# Patient Record
Sex: FEMALE | Race: WHITE | NOT HISPANIC OR LATINO | Employment: OTHER | URBAN - NONMETROPOLITAN AREA
[De-identification: names, ages, dates, MRNs, and addresses within clinical notes are randomized per-mention and may not be internally consistent; named-entity substitution may affect disease eponyms.]

---

## 2019-09-26 ENCOUNTER — APPOINTMENT (OUTPATIENT)
Dept: ULTRASOUND IMAGING | Facility: HOSPITAL | Age: 59
End: 2019-09-26

## 2019-09-26 ENCOUNTER — HOSPITAL ENCOUNTER (INPATIENT)
Facility: HOSPITAL | Age: 59
LOS: 5 days | Discharge: HOME | End: 2019-10-01
Attending: FAMILY MEDICINE | Admitting: FAMILY MEDICINE

## 2019-09-26 DIAGNOSIS — J44.1 CHRONIC OBSTRUCTIVE PULMONARY DISEASE WITH ACUTE EXACERBATION (HCC): ICD-10-CM

## 2019-09-26 DIAGNOSIS — E87.1 HYPONATREMIA: ICD-10-CM

## 2019-09-26 DIAGNOSIS — J44.0 CHRONIC OBSTRUCTIVE PULMONARY DISEASE WITH ACUTE LOWER RESPIRATORY INFECTION (HCC): ICD-10-CM

## 2019-09-26 DIAGNOSIS — G89.29 OTHER CHRONIC PAIN: ICD-10-CM

## 2019-09-26 DIAGNOSIS — J44.9 CHRONIC OBSTRUCTIVE PULMONARY DISEASE, UNSPECIFIED COPD TYPE (HCC): Primary | ICD-10-CM

## 2019-09-26 DIAGNOSIS — D64.9 ANEMIA, UNSPECIFIED TYPE: ICD-10-CM

## 2019-09-26 DIAGNOSIS — Z74.09 IMPAIRED FUNCTIONAL MOBILITY AND ACTIVITY TOLERANCE: ICD-10-CM

## 2019-09-26 LAB
ALBUMIN SERPL-MCNC: 4.3 G/DL (ref 3.5–5.2)
ALBUMIN/GLOB SERPL: 1.4 G/DL
ALP SERPL-CCNC: 49 U/L (ref 39–117)
ALT SERPL W P-5'-P-CCNC: 12 U/L (ref 1–33)
AMYLASE SERPL-CCNC: 47 U/L (ref 28–100)
ANION GAP SERPL CALCULATED.3IONS-SCNC: 13 MMOL/L (ref 5–15)
ANISOCYTOSIS BLD QL: ABNORMAL
AST SERPL-CCNC: 20 U/L (ref 1–32)
BASOPHILS # BLD MANUAL: 0.05 10*3/MM3 (ref 0–0.2)
BASOPHILS NFR BLD AUTO: 1 % (ref 0–1.5)
BILIRUB SERPL-MCNC: 0.4 MG/DL (ref 0.2–1.2)
BUN BLD-MCNC: 20 MG/DL (ref 8–23)
BUN/CREAT SERPL: 30.3 (ref 7–25)
CALCIUM SPEC-SCNC: 8.8 MG/DL (ref 8.6–10.5)
CHLORIDE SERPL-SCNC: 85 MMOL/L (ref 98–107)
CO2 SERPL-SCNC: 23 MMOL/L (ref 22–29)
CREAT BLD-MCNC: 0.66 MG/DL (ref 0.57–1)
DEPRECATED RDW RBC AUTO: 42.9 FL (ref 37–54)
EOSINOPHIL # BLD MANUAL: 0.05 10*3/MM3 (ref 0–0.4)
EOSINOPHIL NFR BLD MANUAL: 1 % (ref 0.3–6.2)
ERYTHROCYTE [DISTWIDTH] IN BLOOD BY AUTOMATED COUNT: 15.9 % (ref 12.3–15.4)
GFR SERPL CREATININE-BSD FRML MDRD: 91 ML/MIN/1.73
GIANT PLATELETS: ABNORMAL
GLOBULIN UR ELPH-MCNC: 3 GM/DL
GLUCOSE BLD-MCNC: 101 MG/DL (ref 65–99)
HCT VFR BLD AUTO: 25 % (ref 34–46.6)
HGB BLD-MCNC: 8 G/DL (ref 12–15.9)
HYPOCHROMIA BLD QL: ABNORMAL
LIPASE SERPL-CCNC: 59 U/L (ref 13–60)
LYMPHOCYTES # BLD MANUAL: 0.91 10*3/MM3 (ref 0.7–3.1)
LYMPHOCYTES NFR BLD MANUAL: 2 % (ref 5–12)
LYMPHOCYTES NFR BLD MANUAL: 20 % (ref 19.6–45.3)
MCH RBC QN AUTO: 24 PG (ref 26.6–33)
MCHC RBC AUTO-ENTMCNC: 32 G/DL (ref 31.5–35.7)
MCV RBC AUTO: 74.9 FL (ref 79–97)
MONOCYTES # BLD AUTO: 0.09 10*3/MM3 (ref 0.1–0.9)
NEUTROPHILS # BLD AUTO: 3.45 10*3/MM3 (ref 1.7–7)
NEUTROPHILS NFR BLD MANUAL: 76 % (ref 42.7–76)
PLATELET # BLD AUTO: 123 10*3/MM3 (ref 140–450)
PMV BLD AUTO: 9.7 FL (ref 6–12)
POTASSIUM BLD-SCNC: 4.4 MMOL/L (ref 3.5–5.2)
PROT SERPL-MCNC: 7.3 G/DL (ref 6–8.5)
RBC # BLD AUTO: 3.34 10*6/MM3 (ref 3.77–5.28)
SMALL PLATELETS BLD QL SMEAR: ABNORMAL
SODIUM BLD-SCNC: 121 MMOL/L (ref 136–145)
WBC MORPH BLD: NORMAL
WBC NRBC COR # BLD: 4.54 10*3/MM3 (ref 3.4–10.8)

## 2019-09-26 PROCEDURE — 94799 UNLISTED PULMONARY SVC/PX: CPT

## 2019-09-26 PROCEDURE — 85007 BL SMEAR W/DIFF WBC COUNT: CPT | Performed by: FAMILY MEDICINE

## 2019-09-26 PROCEDURE — 83690 ASSAY OF LIPASE: CPT | Performed by: FAMILY MEDICINE

## 2019-09-26 PROCEDURE — 94760 N-INVAS EAR/PLS OXIMETRY 1: CPT

## 2019-09-26 PROCEDURE — 82150 ASSAY OF AMYLASE: CPT | Performed by: FAMILY MEDICINE

## 2019-09-26 PROCEDURE — 85025 COMPLETE CBC W/AUTO DIFF WBC: CPT | Performed by: FAMILY MEDICINE

## 2019-09-26 PROCEDURE — 94640 AIRWAY INHALATION TREATMENT: CPT

## 2019-09-26 PROCEDURE — 80053 COMPREHEN METABOLIC PANEL: CPT | Performed by: FAMILY MEDICINE

## 2019-09-26 PROCEDURE — 76705 ECHO EXAM OF ABDOMEN: CPT

## 2019-09-26 RX ORDER — MONTELUKAST SODIUM 10 MG/1
10 TABLET ORAL
COMMUNITY

## 2019-09-26 RX ORDER — CYCLOBENZAPRINE HCL 10 MG
10 TABLET ORAL 3 TIMES DAILY PRN
Status: DISCONTINUED | OUTPATIENT
Start: 2019-09-26 | End: 2019-10-01 | Stop reason: HOSPADM

## 2019-09-26 RX ORDER — ONDANSETRON 2 MG/ML
4 INJECTION INTRAMUSCULAR; INTRAVENOUS EVERY 6 HOURS PRN
Status: DISCONTINUED | OUTPATIENT
Start: 2019-09-26 | End: 2019-10-01 | Stop reason: HOSPADM

## 2019-09-26 RX ORDER — LOSARTAN POTASSIUM AND HYDROCHLOROTHIAZIDE 25; 100 MG/1; MG/1
1 TABLET ORAL 2 TIMES DAILY
COMMUNITY
End: 2020-01-01

## 2019-09-26 RX ORDER — ALPRAZOLAM 0.5 MG/1
0.5 TABLET ORAL 3 TIMES DAILY PRN
Status: ON HOLD | COMMUNITY
End: 2020-01-01

## 2019-09-26 RX ORDER — HYDROCODONE BITARTRATE AND ACETAMINOPHEN 10; 325 MG/1; MG/1
2 TABLET ORAL 4 TIMES DAILY PRN
COMMUNITY
End: 2020-01-01 | Stop reason: HOSPADM

## 2019-09-26 RX ORDER — IPRATROPIUM BROMIDE AND ALBUTEROL SULFATE 2.5; .5 MG/3ML; MG/3ML
3 SOLUTION RESPIRATORY (INHALATION)
Status: DISCONTINUED | OUTPATIENT
Start: 2019-09-26 | End: 2019-10-01 | Stop reason: HOSPADM

## 2019-09-26 RX ORDER — METOPROLOL SUCCINATE 50 MG/1
50 TABLET, EXTENDED RELEASE ORAL
Status: DISCONTINUED | OUTPATIENT
Start: 2019-09-26 | End: 2019-10-01 | Stop reason: HOSPADM

## 2019-09-26 RX ORDER — MONTELUKAST SODIUM 10 MG/1
10 TABLET ORAL
Status: DISCONTINUED | OUTPATIENT
Start: 2019-09-26 | End: 2019-10-01 | Stop reason: HOSPADM

## 2019-09-26 RX ORDER — CYCLOBENZAPRINE HCL 10 MG
10 TABLET ORAL 3 TIMES DAILY PRN
COMMUNITY
End: 2019-10-01 | Stop reason: HOSPADM

## 2019-09-26 RX ORDER — DICYCLOMINE HYDROCHLORIDE 10 MG/1
10 CAPSULE ORAL
Status: DISCONTINUED | OUTPATIENT
Start: 2019-09-26 | End: 2019-10-01 | Stop reason: HOSPADM

## 2019-09-26 RX ORDER — HYDROCODONE BITARTRATE AND ACETAMINOPHEN 10; 325 MG/1; MG/1
2 TABLET ORAL EVERY 6 HOURS PRN
Status: DISCONTINUED | OUTPATIENT
Start: 2019-09-26 | End: 2019-09-27

## 2019-09-26 RX ORDER — ESTRADIOL 1 MG/1
1 TABLET ORAL DAILY
COMMUNITY
Start: 2019-08-05 | End: 2019-10-01 | Stop reason: HOSPADM

## 2019-09-26 RX ORDER — ALBUTEROL SULFATE 90 UG/1
2 AEROSOL, METERED RESPIRATORY (INHALATION) EVERY 6 HOURS PRN
COMMUNITY

## 2019-09-26 RX ORDER — METOPROLOL SUCCINATE 100 MG/1
50 TABLET, EXTENDED RELEASE ORAL 2 TIMES DAILY
COMMUNITY

## 2019-09-26 RX ORDER — SODIUM CHLORIDE 9 MG/ML
75 INJECTION, SOLUTION INTRAVENOUS CONTINUOUS
Status: DISCONTINUED | OUTPATIENT
Start: 2019-09-26 | End: 2019-09-28

## 2019-09-26 RX ORDER — ALPRAZOLAM 0.5 MG/1
0.5 TABLET ORAL 3 TIMES DAILY PRN
Status: DISCONTINUED | OUTPATIENT
Start: 2019-09-26 | End: 2019-10-01 | Stop reason: HOSPADM

## 2019-09-26 RX ORDER — DICYCLOMINE HYDROCHLORIDE 10 MG/1
10 CAPSULE ORAL
COMMUNITY
End: 2020-01-01

## 2019-09-26 RX ORDER — ACETAMINOPHEN 325 MG/1
650 TABLET ORAL EVERY 6 HOURS PRN
Status: DISCONTINUED | OUTPATIENT
Start: 2019-09-26 | End: 2019-09-27

## 2019-09-26 RX ADMIN — HYDROCODONE BITARTRATE AND ACETAMINOPHEN 1 TABLET: 10; 325 TABLET ORAL at 17:06

## 2019-09-26 RX ADMIN — HYDROCODONE BITARTRATE AND ACETAMINOPHEN 1 TABLET: 10; 325 TABLET ORAL at 18:22

## 2019-09-26 RX ADMIN — ACETAMINOPHEN 650 MG: 325 TABLET, FILM COATED ORAL at 21:47

## 2019-09-26 RX ADMIN — IPRATROPIUM BROMIDE AND ALBUTEROL SULFATE 3 ML: 2.5; .5 SOLUTION RESPIRATORY (INHALATION) at 20:25

## 2019-09-26 RX ADMIN — IPRATROPIUM BROMIDE AND ALBUTEROL SULFATE 3 ML: 2.5; .5 SOLUTION RESPIRATORY (INHALATION) at 14:43

## 2019-09-26 RX ADMIN — ALPRAZOLAM 0.5 MG: 0.5 TABLET ORAL at 17:21

## 2019-09-26 RX ADMIN — SODIUM CHLORIDE 75 ML/HR: 9 INJECTION, SOLUTION INTRAVENOUS at 16:05

## 2019-09-27 PROBLEM — E87.1 HYPONATREMIA: Status: ACTIVE | Noted: 2019-09-27

## 2019-09-27 LAB
ALBUMIN SERPL-MCNC: 4.2 G/DL (ref 3.5–5.2)
ALBUMIN/GLOB SERPL: 1.4 G/DL
ALP SERPL-CCNC: 55 U/L (ref 39–117)
ALT SERPL W P-5'-P-CCNC: 13 U/L (ref 1–33)
ANION GAP SERPL CALCULATED.3IONS-SCNC: 13 MMOL/L (ref 5–15)
AST SERPL-CCNC: 23 U/L (ref 1–32)
BASOPHILS # BLD AUTO: 0.04 10*3/MM3 (ref 0–0.2)
BASOPHILS NFR BLD AUTO: 0.7 % (ref 0–1.5)
BILIRUB SERPL-MCNC: 0.3 MG/DL (ref 0.2–1.2)
BUN BLD-MCNC: 21 MG/DL (ref 8–23)
BUN/CREAT SERPL: 21.2 (ref 7–25)
CALCIUM SPEC-SCNC: 8.6 MG/DL (ref 8.6–10.5)
CHLORIDE SERPL-SCNC: 87 MMOL/L (ref 98–107)
CO2 SERPL-SCNC: 24 MMOL/L (ref 22–29)
CREAT BLD-MCNC: 0.99 MG/DL (ref 0.57–1)
DEPRECATED RDW RBC AUTO: 43.8 FL (ref 37–54)
EOSINOPHIL # BLD AUTO: 0.08 10*3/MM3 (ref 0–0.4)
EOSINOPHIL NFR BLD AUTO: 1.4 % (ref 0.3–6.2)
ERYTHROCYTE [DISTWIDTH] IN BLOOD BY AUTOMATED COUNT: 15.9 % (ref 12.3–15.4)
GFR SERPL CREATININE-BSD FRML MDRD: 57 ML/MIN/1.73
GLOBULIN UR ELPH-MCNC: 3.1 GM/DL
GLUCOSE BLD-MCNC: 109 MG/DL (ref 65–99)
HCT VFR BLD AUTO: 26.5 % (ref 34–46.6)
HGB BLD-MCNC: 8.4 G/DL (ref 12–15.9)
LYMPHOCYTES # BLD AUTO: 2.34 10*3/MM3 (ref 0.7–3.1)
LYMPHOCYTES NFR BLD AUTO: 41.4 % (ref 19.6–45.3)
MCH RBC QN AUTO: 24.1 PG (ref 26.6–33)
MCHC RBC AUTO-ENTMCNC: 31.7 G/DL (ref 31.5–35.7)
MCV RBC AUTO: 76.1 FL (ref 79–97)
MONOCYTES # BLD AUTO: 0.6 10*3/MM3 (ref 0.1–0.9)
MONOCYTES NFR BLD AUTO: 10.6 % (ref 5–12)
NEUTROPHILS # BLD AUTO: 2.57 10*3/MM3 (ref 1.7–7)
NEUTROPHILS NFR BLD AUTO: 45.5 % (ref 42.7–76)
PLATELET # BLD AUTO: 137 10*3/MM3 (ref 140–450)
PMV BLD AUTO: 9.9 FL (ref 6–12)
POTASSIUM BLD-SCNC: 4.5 MMOL/L (ref 3.5–5.2)
PROT SERPL-MCNC: 7.3 G/DL (ref 6–8.5)
RBC # BLD AUTO: 3.48 10*6/MM3 (ref 3.77–5.28)
SODIUM BLD-SCNC: 124 MMOL/L (ref 136–145)
WBC NRBC COR # BLD: 5.65 10*3/MM3 (ref 3.4–10.8)

## 2019-09-27 PROCEDURE — 80053 COMPREHEN METABOLIC PANEL: CPT | Performed by: FAMILY MEDICINE

## 2019-09-27 PROCEDURE — 94799 UNLISTED PULMONARY SVC/PX: CPT

## 2019-09-27 PROCEDURE — 97161 PT EVAL LOW COMPLEX 20 MIN: CPT | Performed by: PHYSICAL THERAPIST

## 2019-09-27 PROCEDURE — 94760 N-INVAS EAR/PLS OXIMETRY 1: CPT

## 2019-09-27 PROCEDURE — 85025 COMPLETE CBC W/AUTO DIFF WBC: CPT | Performed by: FAMILY MEDICINE

## 2019-09-27 RX ORDER — HYDROCODONE BITARTRATE AND ACETAMINOPHEN 10; 325 MG/1; MG/1
2 TABLET ORAL EVERY 4 HOURS PRN
Status: DISCONTINUED | OUTPATIENT
Start: 2019-09-27 | End: 2019-10-01 | Stop reason: HOSPADM

## 2019-09-27 RX ORDER — PANTOPRAZOLE SODIUM 40 MG/1
40 TABLET, DELAYED RELEASE ORAL
Status: DISCONTINUED | OUTPATIENT
Start: 2019-09-28 | End: 2019-10-01 | Stop reason: HOSPADM

## 2019-09-27 RX ORDER — ACETAMINOPHEN 325 MG/1
650 TABLET ORAL EVERY 6 HOURS PRN
Status: DISCONTINUED | OUTPATIENT
Start: 2019-09-27 | End: 2019-10-01 | Stop reason: HOSPADM

## 2019-09-27 RX ORDER — ACETAMINOPHEN 325 MG/1
650 TABLET ORAL EVERY 4 HOURS PRN
Status: DISCONTINUED | OUTPATIENT
Start: 2019-09-27 | End: 2019-10-01 | Stop reason: HOSPADM

## 2019-09-27 RX ADMIN — CYCLOBENZAPRINE 5 MG: 10 TABLET, FILM COATED ORAL at 14:10

## 2019-09-27 RX ADMIN — MONTELUKAST SODIUM 10 MG: 10 TABLET, FILM COATED ORAL at 10:07

## 2019-09-27 RX ADMIN — ACETAMINOPHEN 650 MG: 325 TABLET, FILM COATED ORAL at 14:10

## 2019-09-27 RX ADMIN — IPRATROPIUM BROMIDE AND ALBUTEROL SULFATE 3 ML: 2.5; .5 SOLUTION RESPIRATORY (INHALATION) at 07:11

## 2019-09-27 RX ADMIN — IPRATROPIUM BROMIDE AND ALBUTEROL SULFATE 3 ML: 2.5; .5 SOLUTION RESPIRATORY (INHALATION) at 15:32

## 2019-09-27 RX ADMIN — IPRATROPIUM BROMIDE AND ALBUTEROL SULFATE 3 ML: 2.5; .5 SOLUTION RESPIRATORY (INHALATION) at 11:47

## 2019-09-27 RX ADMIN — HYDROCODONE BITARTRATE AND ACETAMINOPHEN 1 TABLET: 10; 325 TABLET ORAL at 10:07

## 2019-09-27 RX ADMIN — HYDROCODONE BITARTRATE AND ACETAMINOPHEN 1 TABLET: 10; 325 TABLET ORAL at 17:48

## 2019-09-27 RX ADMIN — ALPRAZOLAM 0.5 MG: 0.5 TABLET ORAL at 19:28

## 2019-09-27 RX ADMIN — SODIUM CHLORIDE 75 ML/HR: 9 INJECTION, SOLUTION INTRAVENOUS at 19:28

## 2019-09-27 RX ADMIN — SODIUM CHLORIDE 75 ML/HR: 9 INJECTION, SOLUTION INTRAVENOUS at 05:41

## 2019-09-27 RX ADMIN — HYDROCODONE BITARTRATE AND ACETAMINOPHEN 1 TABLET: 10; 325 TABLET ORAL at 01:05

## 2019-09-27 RX ADMIN — METOPROLOL SUCCINATE 50 MG: 50 TABLET, FILM COATED, EXTENDED RELEASE ORAL at 08:51

## 2019-09-27 RX ADMIN — LOSARTAN POTASSIUM: 50 TABLET, FILM COATED ORAL at 08:51

## 2019-09-27 RX ADMIN — IPRATROPIUM BROMIDE AND ALBUTEROL SULFATE 3 ML: 2.5; .5 SOLUTION RESPIRATORY (INHALATION) at 19:37

## 2019-09-28 ENCOUNTER — APPOINTMENT (OUTPATIENT)
Dept: GENERAL RADIOLOGY | Facility: HOSPITAL | Age: 59
End: 2019-09-28

## 2019-09-28 LAB
ALBUMIN SERPL-MCNC: 4 G/DL (ref 3.5–5.2)
ALBUMIN/GLOB SERPL: 1.4 G/DL
ALP SERPL-CCNC: 56 U/L (ref 39–117)
ALT SERPL W P-5'-P-CCNC: 12 U/L (ref 1–33)
ANION GAP SERPL CALCULATED.3IONS-SCNC: 13 MMOL/L (ref 5–15)
AST SERPL-CCNC: 22 U/L (ref 1–32)
BASOPHILS # BLD AUTO: 0.04 10*3/MM3 (ref 0–0.2)
BASOPHILS NFR BLD AUTO: 1 % (ref 0–1.5)
BILIRUB SERPL-MCNC: 0.2 MG/DL (ref 0.2–1.2)
BUN BLD-MCNC: 17 MG/DL (ref 8–23)
BUN/CREAT SERPL: 23.6 (ref 7–25)
CALCIUM SPEC-SCNC: 8.1 MG/DL (ref 8.6–10.5)
CHLORIDE SERPL-SCNC: 93 MMOL/L (ref 98–107)
CO2 SERPL-SCNC: 22 MMOL/L (ref 22–29)
CREAT BLD-MCNC: 0.72 MG/DL (ref 0.57–1)
DEPRECATED RDW RBC AUTO: 46 FL (ref 37–54)
EOSINOPHIL # BLD AUTO: 0.05 10*3/MM3 (ref 0–0.4)
EOSINOPHIL NFR BLD AUTO: 1.2 % (ref 0.3–6.2)
ERYTHROCYTE [DISTWIDTH] IN BLOOD BY AUTOMATED COUNT: 16 % (ref 12.3–15.4)
GFR SERPL CREATININE-BSD FRML MDRD: 82 ML/MIN/1.73
GLOBULIN UR ELPH-MCNC: 2.9 GM/DL
GLUCOSE BLD-MCNC: 129 MG/DL (ref 65–99)
HCT VFR BLD AUTO: 25.4 % (ref 34–46.6)
HGB BLD-MCNC: 7.9 G/DL (ref 12–15.9)
LYMPHOCYTES # BLD AUTO: 1.76 10*3/MM3 (ref 0.7–3.1)
LYMPHOCYTES NFR BLD AUTO: 42.4 % (ref 19.6–45.3)
MCH RBC QN AUTO: 24.1 PG (ref 26.6–33)
MCHC RBC AUTO-ENTMCNC: 31.1 G/DL (ref 31.5–35.7)
MCV RBC AUTO: 77.4 FL (ref 79–97)
MONOCYTES # BLD AUTO: 0.41 10*3/MM3 (ref 0.1–0.9)
MONOCYTES NFR BLD AUTO: 9.9 % (ref 5–12)
NEUTROPHILS # BLD AUTO: 1.87 10*3/MM3 (ref 1.7–7)
NEUTROPHILS NFR BLD AUTO: 45 % (ref 42.7–76)
PLATELET # BLD AUTO: 126 10*3/MM3 (ref 140–450)
PMV BLD AUTO: 10.3 FL (ref 6–12)
POTASSIUM BLD-SCNC: 4.1 MMOL/L (ref 3.5–5.2)
PROT SERPL-MCNC: 6.9 G/DL (ref 6–8.5)
RBC # BLD AUTO: 3.28 10*6/MM3 (ref 3.77–5.28)
SODIUM BLD-SCNC: 128 MMOL/L (ref 136–145)
WBC NRBC COR # BLD: 4.15 10*3/MM3 (ref 3.4–10.8)

## 2019-09-28 PROCEDURE — 94799 UNLISTED PULMONARY SVC/PX: CPT

## 2019-09-28 PROCEDURE — 25010000002 FUROSEMIDE PER 20 MG: Performed by: INTERNAL MEDICINE

## 2019-09-28 PROCEDURE — 85025 COMPLETE CBC W/AUTO DIFF WBC: CPT | Performed by: FAMILY MEDICINE

## 2019-09-28 PROCEDURE — 80053 COMPREHEN METABOLIC PANEL: CPT | Performed by: FAMILY MEDICINE

## 2019-09-28 PROCEDURE — 94760 N-INVAS EAR/PLS OXIMETRY 1: CPT

## 2019-09-28 PROCEDURE — 71046 X-RAY EXAM CHEST 2 VIEWS: CPT

## 2019-09-28 RX ORDER — FUROSEMIDE 10 MG/ML
20 INJECTION INTRAMUSCULAR; INTRAVENOUS ONCE
Status: COMPLETED | OUTPATIENT
Start: 2019-09-28 | End: 2019-09-28

## 2019-09-28 RX ADMIN — CYCLOBENZAPRINE 10 MG: 10 TABLET, FILM COATED ORAL at 03:03

## 2019-09-28 RX ADMIN — ALPRAZOLAM 0.5 MG: 0.5 TABLET ORAL at 08:25

## 2019-09-28 RX ADMIN — IPRATROPIUM BROMIDE AND ALBUTEROL SULFATE 3 ML: 2.5; .5 SOLUTION RESPIRATORY (INHALATION) at 14:44

## 2019-09-28 RX ADMIN — HYDROCODONE BITARTRATE AND ACETAMINOPHEN 1 TABLET: 10; 325 TABLET ORAL at 03:03

## 2019-09-28 RX ADMIN — HYDROCODONE BITARTRATE AND ACETAMINOPHEN 2 TABLET: 10; 325 TABLET ORAL at 08:25

## 2019-09-28 RX ADMIN — HYDROCODONE BITARTRATE AND ACETAMINOPHEN 2 TABLET: 10; 325 TABLET ORAL at 17:12

## 2019-09-28 RX ADMIN — ACETAMINOPHEN 650 MG: 325 TABLET, FILM COATED ORAL at 05:01

## 2019-09-28 RX ADMIN — FUROSEMIDE 20 MG: 10 INJECTION, SOLUTION INTRAMUSCULAR; INTRAVENOUS at 21:14

## 2019-09-28 RX ADMIN — IPRATROPIUM BROMIDE AND ALBUTEROL SULFATE 3 ML: 2.5; .5 SOLUTION RESPIRATORY (INHALATION) at 06:57

## 2019-09-28 RX ADMIN — ALPRAZOLAM 0.5 MG: 0.5 TABLET ORAL at 17:12

## 2019-09-28 RX ADMIN — HYDROCODONE BITARTRATE AND ACETAMINOPHEN 1 TABLET: 10; 325 TABLET ORAL at 21:13

## 2019-09-28 RX ADMIN — SODIUM CHLORIDE 75 ML/HR: 9 INJECTION, SOLUTION INTRAVENOUS at 08:26

## 2019-09-28 RX ADMIN — METOPROLOL SUCCINATE 50 MG: 50 TABLET, FILM COATED, EXTENDED RELEASE ORAL at 08:26

## 2019-09-28 RX ADMIN — PANTOPRAZOLE SODIUM 40 MG: 40 TABLET, DELAYED RELEASE ORAL at 05:01

## 2019-09-28 RX ADMIN — MONTELUKAST SODIUM 10 MG: 10 TABLET, FILM COATED ORAL at 11:41

## 2019-09-28 RX ADMIN — IPRATROPIUM BROMIDE AND ALBUTEROL SULFATE 3 ML: 2.5; .5 SOLUTION RESPIRATORY (INHALATION) at 19:21

## 2019-09-28 RX ADMIN — LOSARTAN POTASSIUM: 50 TABLET, FILM COATED ORAL at 08:26

## 2019-09-28 RX ADMIN — IPRATROPIUM BROMIDE AND ALBUTEROL SULFATE 3 ML: 2.5; .5 SOLUTION RESPIRATORY (INHALATION) at 10:54

## 2019-09-29 LAB
ALBUMIN SERPL-MCNC: 4.1 G/DL (ref 3.5–5.2)
ALBUMIN/GLOB SERPL: 1.3 G/DL
ALP SERPL-CCNC: 60 U/L (ref 39–117)
ALT SERPL W P-5'-P-CCNC: 14 U/L (ref 1–33)
ANION GAP SERPL CALCULATED.3IONS-SCNC: 12 MMOL/L (ref 5–15)
AST SERPL-CCNC: 22 U/L (ref 1–32)
BASOPHILS # BLD AUTO: 0.04 10*3/MM3 (ref 0–0.2)
BASOPHILS NFR BLD AUTO: 0.8 % (ref 0–1.5)
BILIRUB SERPL-MCNC: 0.2 MG/DL (ref 0.2–1.2)
BUN BLD-MCNC: 11 MG/DL (ref 8–23)
BUN/CREAT SERPL: 15.9 (ref 7–25)
CALCIUM SPEC-SCNC: 8.7 MG/DL (ref 8.6–10.5)
CHLORIDE SERPL-SCNC: 91 MMOL/L (ref 98–107)
CO2 SERPL-SCNC: 26 MMOL/L (ref 22–29)
CREAT BLD-MCNC: 0.69 MG/DL (ref 0.57–1)
DEPRECATED RDW RBC AUTO: 46 FL (ref 37–54)
EOSINOPHIL # BLD AUTO: 0.07 10*3/MM3 (ref 0–0.4)
EOSINOPHIL NFR BLD AUTO: 1.5 % (ref 0.3–6.2)
ERYTHROCYTE [DISTWIDTH] IN BLOOD BY AUTOMATED COUNT: 16.1 % (ref 12.3–15.4)
GFR SERPL CREATININE-BSD FRML MDRD: 86 ML/MIN/1.73
GLOBULIN UR ELPH-MCNC: 3.2 GM/DL
GLUCOSE BLD-MCNC: 119 MG/DL (ref 65–99)
HCT VFR BLD AUTO: 23.7 % (ref 34–46.6)
HGB BLD-MCNC: 7.3 G/DL (ref 12–15.9)
LYMPHOCYTES # BLD AUTO: 1.46 10*3/MM3 (ref 0.7–3.1)
LYMPHOCYTES NFR BLD AUTO: 30.7 % (ref 19.6–45.3)
MCH RBC QN AUTO: 24 PG (ref 26.6–33)
MCHC RBC AUTO-ENTMCNC: 30.8 G/DL (ref 31.5–35.7)
MCV RBC AUTO: 78 FL (ref 79–97)
MONOCYTES # BLD AUTO: 0.5 10*3/MM3 (ref 0.1–0.9)
MONOCYTES NFR BLD AUTO: 10.5 % (ref 5–12)
NEUTROPHILS # BLD AUTO: 2.67 10*3/MM3 (ref 1.7–7)
NEUTROPHILS NFR BLD AUTO: 56.1 % (ref 42.7–76)
PLATELET # BLD AUTO: 110 10*3/MM3 (ref 140–450)
PMV BLD AUTO: 10.2 FL (ref 6–12)
POTASSIUM BLD-SCNC: 4 MMOL/L (ref 3.5–5.2)
PROT SERPL-MCNC: 7.3 G/DL (ref 6–8.5)
RBC # BLD AUTO: 3.04 10*6/MM3 (ref 3.77–5.28)
SODIUM BLD-SCNC: 129 MMOL/L (ref 136–145)
WBC NRBC COR # BLD: 4.76 10*3/MM3 (ref 3.4–10.8)

## 2019-09-29 PROCEDURE — 94799 UNLISTED PULMONARY SVC/PX: CPT

## 2019-09-29 PROCEDURE — 97116 GAIT TRAINING THERAPY: CPT

## 2019-09-29 PROCEDURE — 80053 COMPREHEN METABOLIC PANEL: CPT | Performed by: FAMILY MEDICINE

## 2019-09-29 PROCEDURE — 94760 N-INVAS EAR/PLS OXIMETRY 1: CPT

## 2019-09-29 PROCEDURE — 85025 COMPLETE CBC W/AUTO DIFF WBC: CPT | Performed by: FAMILY MEDICINE

## 2019-09-29 PROCEDURE — 25010000002 FUROSEMIDE PER 20 MG: Performed by: FAMILY MEDICINE

## 2019-09-29 RX ORDER — FUROSEMIDE 10 MG/ML
20 INJECTION INTRAMUSCULAR; INTRAVENOUS ONCE
Status: COMPLETED | OUTPATIENT
Start: 2019-09-29 | End: 2019-09-29

## 2019-09-29 RX ADMIN — MONTELUKAST SODIUM 10 MG: 10 TABLET, FILM COATED ORAL at 12:02

## 2019-09-29 RX ADMIN — FUROSEMIDE 20 MG: 10 INJECTION, SOLUTION INTRAMUSCULAR; INTRAVENOUS at 12:01

## 2019-09-29 RX ADMIN — IPRATROPIUM BROMIDE AND ALBUTEROL SULFATE 3 ML: 2.5; .5 SOLUTION RESPIRATORY (INHALATION) at 10:34

## 2019-09-29 RX ADMIN — HYDROCODONE BITARTRATE AND ACETAMINOPHEN 2 TABLET: 10; 325 TABLET ORAL at 19:09

## 2019-09-29 RX ADMIN — METOPROLOL SUCCINATE 50 MG: 50 TABLET, FILM COATED, EXTENDED RELEASE ORAL at 10:06

## 2019-09-29 RX ADMIN — IPRATROPIUM BROMIDE AND ALBUTEROL SULFATE 3 ML: 2.5; .5 SOLUTION RESPIRATORY (INHALATION) at 15:02

## 2019-09-29 RX ADMIN — HYDROCODONE BITARTRATE AND ACETAMINOPHEN 2 TABLET: 10; 325 TABLET ORAL at 01:57

## 2019-09-29 RX ADMIN — PANTOPRAZOLE SODIUM 40 MG: 40 TABLET, DELAYED RELEASE ORAL at 06:39

## 2019-09-29 RX ADMIN — HYDROCODONE BITARTRATE AND ACETAMINOPHEN 2 TABLET: 10; 325 TABLET ORAL at 23:17

## 2019-09-29 RX ADMIN — IPRATROPIUM BROMIDE AND ALBUTEROL SULFATE 3 ML: 2.5; .5 SOLUTION RESPIRATORY (INHALATION) at 06:41

## 2019-09-29 RX ADMIN — IPRATROPIUM BROMIDE AND ALBUTEROL SULFATE 3 ML: 2.5; .5 SOLUTION RESPIRATORY (INHALATION) at 19:57

## 2019-09-29 RX ADMIN — LOSARTAN POTASSIUM: 50 TABLET, FILM COATED ORAL at 12:02

## 2019-09-29 RX ADMIN — HYDROCODONE BITARTRATE AND ACETAMINOPHEN 2 TABLET: 10; 325 TABLET ORAL at 10:21

## 2019-09-29 RX ADMIN — ACETAMINOPHEN 650 MG: 325 TABLET, FILM COATED ORAL at 10:20

## 2019-09-30 PROBLEM — G89.29 CHRONIC PAIN: Status: ACTIVE | Noted: 2019-09-30

## 2019-09-30 PROBLEM — J44.9 COPD (CHRONIC OBSTRUCTIVE PULMONARY DISEASE) (HCC): Status: ACTIVE | Noted: 2019-09-30

## 2019-09-30 PROBLEM — D64.9 ANEMIA: Status: ACTIVE | Noted: 2019-09-30

## 2019-09-30 LAB
ALBUMIN SERPL-MCNC: 4 G/DL (ref 3.5–5.2)
ALBUMIN/GLOB SERPL: 1.2 G/DL
ALP SERPL-CCNC: 64 U/L (ref 39–117)
ALT SERPL W P-5'-P-CCNC: 14 U/L (ref 1–33)
ANION GAP SERPL CALCULATED.3IONS-SCNC: 12 MMOL/L (ref 5–15)
ANISOCYTOSIS BLD QL: ABNORMAL
AST SERPL-CCNC: 22 U/L (ref 1–32)
BILIRUB SERPL-MCNC: 0.3 MG/DL (ref 0.2–1.2)
BUN BLD-MCNC: 11 MG/DL (ref 8–23)
BUN/CREAT SERPL: 16.4 (ref 7–25)
CALCIUM SPEC-SCNC: 8.7 MG/DL (ref 8.6–10.5)
CHLORIDE SERPL-SCNC: 89 MMOL/L (ref 98–107)
CO2 SERPL-SCNC: 29 MMOL/L (ref 22–29)
CREAT BLD-MCNC: 0.67 MG/DL (ref 0.57–1)
DEPRECATED RDW RBC AUTO: 46.2 FL (ref 37–54)
EOSINOPHIL # BLD MANUAL: 0.11 10*3/MM3 (ref 0–0.4)
EOSINOPHIL NFR BLD MANUAL: 2.4 % (ref 0.3–6.2)
ERYTHROCYTE [DISTWIDTH] IN BLOOD BY AUTOMATED COUNT: 16.2 % (ref 12.3–15.4)
GFR SERPL CREATININE-BSD FRML MDRD: 89 ML/MIN/1.73
GIANT PLATELETS: ABNORMAL
GLOBULIN UR ELPH-MCNC: 3.3 GM/DL
GLUCOSE BLD-MCNC: 126 MG/DL (ref 65–99)
HCT VFR BLD AUTO: 22.8 % (ref 34–46.6)
HGB BLD-MCNC: 7.2 G/DL (ref 12–15.9)
HYPOCHROMIA BLD QL: ABNORMAL
LYMPHOCYTES # BLD MANUAL: 1.03 10*3/MM3 (ref 0.7–3.1)
LYMPHOCYTES NFR BLD MANUAL: 1.2 % (ref 5–12)
LYMPHOCYTES NFR BLD MANUAL: 23.5 % (ref 19.6–45.3)
MCH RBC QN AUTO: 24.4 PG (ref 26.6–33)
MCHC RBC AUTO-ENTMCNC: 31.6 G/DL (ref 31.5–35.7)
MCV RBC AUTO: 77.3 FL (ref 79–97)
MICROCYTES BLD QL: ABNORMAL
MONOCYTES # BLD AUTO: 0.05 10*3/MM3 (ref 0.1–0.9)
NEUTROPHILS # BLD AUTO: 3.21 10*3/MM3 (ref 1.7–7)
NEUTROPHILS NFR BLD MANUAL: 71.8 % (ref 42.7–76)
NEUTS BAND NFR BLD MANUAL: 1.2 % (ref 0–5)
NRBC SPEC MANUAL: 3.5 /100 WBC (ref 0–0.2)
PLATELET # BLD AUTO: 121 10*3/MM3 (ref 140–450)
PMV BLD AUTO: 10.5 FL (ref 6–12)
POTASSIUM BLD-SCNC: 3.8 MMOL/L (ref 3.5–5.2)
PROT SERPL-MCNC: 7.3 G/DL (ref 6–8.5)
RBC # BLD AUTO: 2.95 10*6/MM3 (ref 3.77–5.28)
SODIUM BLD-SCNC: 130 MMOL/L (ref 136–145)
WBC MORPH BLD: NORMAL
WBC NRBC COR # BLD: 4.4 10*3/MM3 (ref 3.4–10.8)

## 2019-09-30 PROCEDURE — 97116 GAIT TRAINING THERAPY: CPT

## 2019-09-30 PROCEDURE — 85025 COMPLETE CBC W/AUTO DIFF WBC: CPT | Performed by: FAMILY MEDICINE

## 2019-09-30 PROCEDURE — 94760 N-INVAS EAR/PLS OXIMETRY 1: CPT

## 2019-09-30 PROCEDURE — 80053 COMPREHEN METABOLIC PANEL: CPT | Performed by: FAMILY MEDICINE

## 2019-09-30 PROCEDURE — 97530 THERAPEUTIC ACTIVITIES: CPT

## 2019-09-30 PROCEDURE — 94799 UNLISTED PULMONARY SVC/PX: CPT

## 2019-09-30 PROCEDURE — 25010000002 COSYNTROPIN PER 0.25 MG: Performed by: FAMILY MEDICINE

## 2019-09-30 PROCEDURE — 25010000002 IRON SUCROSE PER 1 MG: Performed by: FAMILY MEDICINE

## 2019-09-30 PROCEDURE — 85007 BL SMEAR W/DIFF WBC COUNT: CPT | Performed by: FAMILY MEDICINE

## 2019-09-30 PROCEDURE — 82024 ASSAY OF ACTH: CPT | Performed by: FAMILY MEDICINE

## 2019-09-30 PROCEDURE — 82533 TOTAL CORTISOL: CPT | Performed by: FAMILY MEDICINE

## 2019-09-30 RX ORDER — COSYNTROPIN 0.25 MG/ML
0.25 INJECTION, POWDER, FOR SOLUTION INTRAMUSCULAR; INTRAVENOUS ONCE
Status: COMPLETED | OUTPATIENT
Start: 2019-09-30 | End: 2019-09-30

## 2019-09-30 RX ADMIN — HYDROCODONE BITARTRATE AND ACETAMINOPHEN 2 TABLET: 10; 325 TABLET ORAL at 10:29

## 2019-09-30 RX ADMIN — IPRATROPIUM BROMIDE AND ALBUTEROL SULFATE 3 ML: 2.5; .5 SOLUTION RESPIRATORY (INHALATION) at 07:06

## 2019-09-30 RX ADMIN — HYDROCODONE BITARTRATE AND ACETAMINOPHEN 2 TABLET: 10; 325 TABLET ORAL at 20:12

## 2019-09-30 RX ADMIN — MONTELUKAST SODIUM 10 MG: 10 TABLET, FILM COATED ORAL at 10:29

## 2019-09-30 RX ADMIN — METOPROLOL SUCCINATE 50 MG: 50 TABLET, FILM COATED, EXTENDED RELEASE ORAL at 09:01

## 2019-09-30 RX ADMIN — PANTOPRAZOLE SODIUM 40 MG: 40 TABLET, DELAYED RELEASE ORAL at 09:01

## 2019-09-30 RX ADMIN — IRON SUCROSE 300 MG: 20 INJECTION, SOLUTION INTRAVENOUS at 17:12

## 2019-09-30 RX ADMIN — LOSARTAN POTASSIUM: 50 TABLET, FILM COATED ORAL at 10:29

## 2019-09-30 RX ADMIN — IPRATROPIUM BROMIDE AND ALBUTEROL SULFATE 3 ML: 2.5; .5 SOLUTION RESPIRATORY (INHALATION) at 18:34

## 2019-09-30 RX ADMIN — COSYNTROPIN 0.25 MG: 0.25 INJECTION, POWDER, LYOPHILIZED, FOR SOLUTION INTRAVENOUS at 17:11

## 2019-09-30 RX ADMIN — IPRATROPIUM BROMIDE AND ALBUTEROL SULFATE 3 ML: 2.5; .5 SOLUTION RESPIRATORY (INHALATION) at 15:00

## 2019-09-30 RX ADMIN — IPRATROPIUM BROMIDE AND ALBUTEROL SULFATE 3 ML: 2.5; .5 SOLUTION RESPIRATORY (INHALATION) at 11:05

## 2019-10-01 VITALS
OXYGEN SATURATION: 98 % | HEIGHT: 61 IN | SYSTOLIC BLOOD PRESSURE: 139 MMHG | TEMPERATURE: 97.5 F | WEIGHT: 145.06 LBS | BODY MASS INDEX: 27.39 KG/M2 | DIASTOLIC BLOOD PRESSURE: 62 MMHG | HEART RATE: 87 BPM | RESPIRATION RATE: 18 BRPM

## 2019-10-01 LAB
ALBUMIN SERPL-MCNC: 4.1 G/DL (ref 3.5–5.2)
ALBUMIN/GLOB SERPL: 1.2 G/DL
ALP SERPL-CCNC: 83 U/L (ref 39–117)
ALT SERPL W P-5'-P-CCNC: 19 U/L (ref 1–33)
ANION GAP SERPL CALCULATED.3IONS-SCNC: 12 MMOL/L (ref 5–15)
AST SERPL-CCNC: 26 U/L (ref 1–32)
BASOPHILS # BLD AUTO: 0.04 10*3/MM3 (ref 0–0.2)
BASOPHILS NFR BLD AUTO: 0.8 % (ref 0–1.5)
BILIRUB SERPL-MCNC: 0.3 MG/DL (ref 0.2–1.2)
BUN BLD-MCNC: 8 MG/DL (ref 8–23)
BUN/CREAT SERPL: 13.1 (ref 7–25)
CALCIUM SPEC-SCNC: 8.8 MG/DL (ref 8.6–10.5)
CHLORIDE SERPL-SCNC: 89 MMOL/L (ref 98–107)
CO2 SERPL-SCNC: 29 MMOL/L (ref 22–29)
CORTIS SERPL-MCNC: 18.53 MCG/DL
CORTIS SERPL-MCNC: 24.1 MCG/DL
CORTIS SERPL-MCNC: 8.49 MCG/DL
CREAT BLD-MCNC: 0.61 MG/DL (ref 0.57–1)
DEPRECATED RDW RBC AUTO: 46.7 FL (ref 37–54)
EOSINOPHIL # BLD AUTO: 0.08 10*3/MM3 (ref 0–0.4)
EOSINOPHIL NFR BLD AUTO: 1.6 % (ref 0.3–6.2)
ERYTHROCYTE [DISTWIDTH] IN BLOOD BY AUTOMATED COUNT: 16.4 % (ref 12.3–15.4)
GFR SERPL CREATININE-BSD FRML MDRD: 100 ML/MIN/1.73
GLOBULIN UR ELPH-MCNC: 3.3 GM/DL
GLUCOSE BLD-MCNC: 124 MG/DL (ref 65–99)
HCT VFR BLD AUTO: 24.2 % (ref 34–46.6)
HEMOCCULT STL QL: NEGATIVE
HGB BLD-MCNC: 7.5 G/DL (ref 12–15.9)
IMM GRANULOCYTES # BLD AUTO: 0.03 10*3/MM3 (ref 0–0.05)
IMM GRANULOCYTES NFR BLD AUTO: 0.6 % (ref 0–0.5)
LYMPHOCYTES # BLD AUTO: 1.21 10*3/MM3 (ref 0.7–3.1)
LYMPHOCYTES NFR BLD AUTO: 23.5 % (ref 19.6–45.3)
MCH RBC QN AUTO: 24.3 PG (ref 26.6–33)
MCHC RBC AUTO-ENTMCNC: 31 G/DL (ref 31.5–35.7)
MCV RBC AUTO: 78.3 FL (ref 79–97)
MONOCYTES # BLD AUTO: 0.47 10*3/MM3 (ref 0.1–0.9)
MONOCYTES NFR BLD AUTO: 9.1 % (ref 5–12)
NEUTROPHILS # BLD AUTO: 3.31 10*3/MM3 (ref 1.7–7)
NEUTROPHILS NFR BLD AUTO: 64.4 % (ref 42.7–76)
NRBC BLD AUTO-RTO: 0 /100 WBC (ref 0–0.2)
PLATELET # BLD AUTO: 144 10*3/MM3 (ref 140–450)
PMV BLD AUTO: 11.5 FL (ref 6–12)
POTASSIUM BLD-SCNC: 4.1 MMOL/L (ref 3.5–5.2)
PROT SERPL-MCNC: 7.4 G/DL (ref 6–8.5)
RBC # BLD AUTO: 3.09 10*6/MM3 (ref 3.77–5.28)
SODIUM BLD-SCNC: 130 MMOL/L (ref 136–145)
WBC NRBC COR # BLD: 5.14 10*3/MM3 (ref 3.4–10.8)

## 2019-10-01 PROCEDURE — 80053 COMPREHEN METABOLIC PANEL: CPT | Performed by: FAMILY MEDICINE

## 2019-10-01 PROCEDURE — 94618 PULMONARY STRESS TESTING: CPT

## 2019-10-01 PROCEDURE — 94760 N-INVAS EAR/PLS OXIMETRY 1: CPT

## 2019-10-01 PROCEDURE — 94799 UNLISTED PULMONARY SVC/PX: CPT

## 2019-10-01 PROCEDURE — 85025 COMPLETE CBC W/AUTO DIFF WBC: CPT | Performed by: FAMILY MEDICINE

## 2019-10-01 PROCEDURE — 82272 OCCULT BLD FECES 1-3 TESTS: CPT | Performed by: FAMILY MEDICINE

## 2019-10-01 RX ORDER — PANTOPRAZOLE SODIUM 40 MG/1
40 TABLET, DELAYED RELEASE ORAL DAILY
Qty: 30 TABLET | Refills: 2 | Status: SHIPPED | OUTPATIENT
Start: 2019-10-01 | End: 2020-01-01 | Stop reason: SDUPTHER

## 2019-10-01 RX ORDER — IPRATROPIUM BROMIDE AND ALBUTEROL SULFATE 2.5; .5 MG/3ML; MG/3ML
3 SOLUTION RESPIRATORY (INHALATION)
Qty: 360 ML | Refills: 2 | Status: ON HOLD | OUTPATIENT
Start: 2019-10-01 | End: 2020-01-01

## 2019-10-01 RX ADMIN — HYDROCODONE BITARTRATE AND ACETAMINOPHEN 2 TABLET: 10; 325 TABLET ORAL at 02:02

## 2019-10-01 RX ADMIN — PANTOPRAZOLE SODIUM 40 MG: 40 TABLET, DELAYED RELEASE ORAL at 06:25

## 2019-10-01 RX ADMIN — HYDROCODONE BITARTRATE AND ACETAMINOPHEN 2 TABLET: 10; 325 TABLET ORAL at 10:50

## 2019-10-01 RX ADMIN — IPRATROPIUM BROMIDE AND ALBUTEROL SULFATE 3 ML: 2.5; .5 SOLUTION RESPIRATORY (INHALATION) at 15:51

## 2019-10-01 RX ADMIN — IPRATROPIUM BROMIDE AND ALBUTEROL SULFATE 3 ML: 2.5; .5 SOLUTION RESPIRATORY (INHALATION) at 10:55

## 2019-10-01 RX ADMIN — METOPROLOL SUCCINATE 50 MG: 50 TABLET, FILM COATED, EXTENDED RELEASE ORAL at 10:10

## 2019-10-01 RX ADMIN — LOSARTAN POTASSIUM: 50 TABLET, FILM COATED ORAL at 10:10

## 2019-10-01 RX ADMIN — IPRATROPIUM BROMIDE AND ALBUTEROL SULFATE 3 ML: 2.5; .5 SOLUTION RESPIRATORY (INHALATION) at 06:50

## 2019-10-01 NOTE — PROGRESS NOTES
Continued Stay Note  [facility]     Patient Name: Muriel Hernandez  MRN: [id number]  Today's Date: 10/1/2019    Admit Date: 9/26/2019    Discharge Plan     Row Name 10/01/19 1524       Plan    Plan Comments  Pt is being discharged home with walker orders, nebulizer orders, HH orders, and home o2 orders. Pt has chosen to receive DME and O2 from [facility] and HH from [facility]. PROMISE spoke with Cass from [facility] who is aware of referral and PROMISE spoke with Sade from [facility] who states that O2 and DME will be delivered this afternoon. PROMISE has faxed appropriate information to [facility]. PROMISE has permission from Ofe Douglas, Director of , to provide cab voucher for pt to be transported home to [facility]. Cab voucher has been given to JAVIER Malagon. PROMISE will follow and assist with any other discharge needs that may arise.     Final Discharge Disposition Code  06 - home with home health care        Discharge Codes    No documentation.       Expected Discharge Date and Time     Expected Discharge Date Expected Discharge Time    Oct 1, 2019             Radha Owusu

## 2019-10-01 NOTE — DISCHARGE SUMMARY
[facility] Medicine Services  DISCHARGE SUMMARY       Date of Admission: 9/26/2019  Date of Discharge:  10/1/2019  Primary Care Physician: Jai Baum MD    Presenting Problem/History of Present Illness:  Hyponatremia [E87.1]     Final Discharge Diagnoses:  Active Hospital Problems    Diagnosis   • Anemia   • COPD (chronic obstructive pulmonary disease) (CMS/HCC)   • Chronic pain   • Hyponatremia       Pertinent Test Results:   IMPRESSION: Chest x-ray  1. Bilateral pulmonary opacities are favorable for edema. Multifocal  pneumonia considered.    IMPRESSION: Ultrasound gallbladder.  Negative gallbladder ultrasound    Chief Complaint on Day of Discharge: none    History of Present Illness on Day of Discharge:   Mrs. Hernandez is a 59 year old lady with a history of asthma and arthritis.  She presents with 1 year of generalized weakness.  She says this has gotten worse in the last few weeks.  She says she gets full easily and has a hard time eating much.  She says she has occasional abdominal pain.  She gets nauseated at times.  She has not vomited.  She denies black or bloody stools.       Hospital Course:  The patient is a 59 y.o. female who presented to [facility] with COPD exacerbation/hyponatremia/chronic smoker.      Hyponatremia.  Improving.    Sodium level today is 130.    Pulmonary edema.  Improved with Lasix.     Anemia.    Hemoglobin stable.  No sign of acute bleed.  Patient got Venofer x 2 days.     COPD.    Patient to continue duo nebs.    Patient to continue Singulair.     Hypertension.    Patient to continue Cozaar and Toprol.     IBS.    Patient to continue Bentyl.     Anxiety.    Patient to continue Xanax as needed.    Chronic smoker.  Patient initially lies to us about smoking.  She then admit to the nurse that she is  chronic smoker.  Discussed consult patient cutting back and stopping.     Nausea.    Patient to continue Protonix.   "     Arthritis/chronic pain.  Patient to continue Norco PRN.       Vital signs stable, afebrile. Patient go home with duo nebs.  Patient also go home with home health.  Patient also go home with a walker.  She will need home oxygen.  Follow with PCP 1 week.    Condition on Discharge:  stable    Physical Exam on Discharge:  /62 (BP Location: Left arm, Patient Position: Sitting)   Pulse 86   Temp 97.5 °F (36.4 °C) (Oral)   Resp 18   Ht 154.9 cm (61\")   Wt 65.8 kg (145 lb 1 oz)   SpO2 98%   BMI 27.41 kg/m²   Physical Exam  Constitutional: She appears well-developed.   HENT:   Head: Normocephalic and atraumatic.   Eyes: Conjunctivae are normal. Pupils are equal, round, and reactive to light.   Neck: Neck supple. No JVD present. No thyromegaly present.   Cardiovascular: Normal rate, regular rhythm, normal heart sounds and intact distal pulses. Exam reveals no gallop and no friction rub.   No murmur heard.  Pulmonary/Chest: Effort normal. No respiratory distress.  Diminished breath sound bilateral, clear.  She has no rales. She exhibits no tenderness.   Abdominal: Soft. Bowel sounds are normal. She exhibits no distension. There is no tenderness. There is no rebound and no guarding.   Musculoskeletal: Normal range of motion. She exhibits no edema, tenderness or deformity.   Lymphadenopathy:     She has no cervical adenopathy.   Neurological: She is alert. She displays normal reflexes. No cranial nerve deficit. She exhibits abnormal muscle tone. Coordination abnormal.   Skin: Skin is warm and dry. Capillary refill takes 2 to 3 seconds. No rash noted.   Psychiatric: She has a normal mood and affect. Her behavior is normal. Judgment and thought content normal.   Nursing note and vitals reviewed.    Discharge Disposition: Discharge home with home health.  Patient go home with oxygen.  Duo nebs nebulizer.  A walker.  Home or Self Care    Discharge Medications:     Discharge Medications      New Medications      " Instructions Start Date   ipratropium-albuterol 0.5-2.5 mg/3 ml nebulizer  Commonly known as:  DUO-NEB   3 mL, Nebulization, 4 Times Daily - RT      pantoprazole 40 MG EC tablet  Commonly known as:  PROTONIX   40 mg, Oral, Daily         Continue These Medications      Instructions Start Date   albuterol sulfate  (90 Base) MCG/ACT inhaler  Commonly known as:  PROVENTIL HFA;VENTOLIN HFA;PROAIR HFA   2 puffs, Inhalation, Every 6 Hours PRN      ALPRAZolam 0.5 MG tablet  Commonly known as:  XANAX   0.5 mg, Oral, 3 Times Daily PRN      dicyclomine 10 MG capsule  Commonly known as:  BENTYL   10 mg, Oral, 4 Times Daily Before Meals & Nightly      HYDROcodone-acetaminophen  MG per tablet  Commonly known as:  NORCO   2 tablets, Oral, 4 Times Daily PRN      losartan-hydrochlorothiazide 100-25 MG per tablet  Commonly known as:  HYZAAR   1 tablet, Oral, 2 Times Daily      metoprolol succinate XL 50 MG 24 hr tablet  Commonly known as:  TOPROL-XL   100 mg, Oral, 2 Times Daily      montelukast 10 MG tablet  Commonly known as:  SINGULAIR   10 mg, Oral, Daily at 1100         Stop These Medications    cyclobenzaprine 10 MG tablet  Commonly known as:  FLEXERIL     estradiol 1 MG tablet  Commonly known as:  ESTRACE            Discharge Diet:   Diet Instructions     Advance Diet As Tolerated            Activity at Discharge:   Activity Instructions     Activity as Tolerated            Discharge Care Plan/Instructions: She will go home with home health.    Follow-up Appointments:   Follow with PCP 1 week.    Massimo Carlson MD  10/01/19  2:34 PM    Time: Greater than 30 minutes.

## 2019-10-01 NOTE — DISCHARGE PLACEMENT REQUEST
"Muriel Arevalo (59 y.o. Female)     Date of Birth Social Security Number Address Home Phone MRN    1960  [address]  [place] [address] [phone] [id number]    Shinto Marital Status          Nondenominational        Admission Date Admission Type Admitting Provider Attending Provider Department, Room/Bed    9/26/19 Urgent Massimo Carlson MD Truong, Khai C, MD [facility] 4B, 406/1    Discharge Date Discharge Disposition Discharge Destination         Home or Self Care              Attending Provider:  Massimo Carlson MD    Allergies:  Sulfa Antibiotics    Isolation:  None   Infection:  None   Code Status:  CPR    Ht:  154.9 cm (61\")   Wt:  65.8 kg (145 lb 1 oz)    Admission Cmt:  None   Principal Problem:  None                Active Insurance as of 9/26/2019     Primary Coverage     Payor Plan Insurance Group Employer/Plan Group    MEDICARE MEDICARE A & B      Payor Plan Address Payor Plan Phone Number Payor Plan Fax Number Effective Dates    [address] [phone]  11/1/2009 - None Entered    [place] [id number]       Subscriber Name Subscriber Birth Date Member ID       MURIEL AREVALO 1960 [id number]                 Emergency Contacts      (Rel.) Home Phone Work Phone Mobile Phone    BOLIVAR AREVALO (Significant Other) [phone] -- [phone]               History & Physical      Cameron Walker MD at 09/26/19 1642              [facility] Medicine Services  HISTORY AND PHYSICAL    Date of Admission: 9/26/2019  Primary Care Physician: Jai Baum MD    Subjective     Chief Complaint: Weakness    History of Present Illness  Mrs. Arevalo is a 59 year old lady with a history of asthma and arthritis.  She presents with 1 year of generalized weakness.  She says this has gotten worse in the last few weeks.  She says she gets full easily and has a hard time eating much.  She says she has " occasional abdominal pain.  She gets nauseated at times.  She has not vomited.  She denies black or bloody stools.           Review of Systems   Constitutional: Positive for fatigue. Negative for fever.   HENT: Negative for congestion and ear pain.    Eyes: Negative for pain and visual disturbance.   Respiratory: Negative for cough, shortness of breath and wheezing.    Cardiovascular: Negative for chest pain and palpitations.   Gastrointestinal: Negative for diarrhea, nausea and vomiting.   Endocrine: Negative for heat intolerance.   Genitourinary: Negative for dysuria and frequency.   Musculoskeletal: Negative for arthralgias and back pain.   Skin: Negative for rash and wound.   Neurological: Positive for weakness. Negative for dizziness and light-headedness.   Psychiatric/Behavioral: Negative for confusion. The patient is not nervous/anxious.    All other systems reviewed and are negative.       Otherwise complete ROS reviewed and negative except as mentioned in the HPI.    Past Medical History:   Past Medical History:   Diagnosis Date   • Arthritis    • Asthma    • Cancer (CMS/HCC)     skin   • COPD (chronic obstructive pulmonary disease) (CMS/[facility])    • Hypertension    • IBS (irritable bowel syndrome)    • TIA (transient ischemic attack)    • UTI (urinary tract infection)      Past Surgical History:  Past Surgical History:   Procedure Laterality Date   • BREAST SURGERY     • COLONOSCOPY     • HYSTERECTOMY     • KNEE SURGERY       Social History:  reports that she has been smoking.  She has never used smokeless tobacco.    Family History: HTN    Allergies:  Allergies   Allergen Reactions   • Sulfa Antibiotics Hives     Medications:  Prior to Admission medications    Medication Sig Start Date End Date Taking? Authorizing Provider   albuterol sulfate  (90 Base) MCG/ACT inhaler Inhale 2 puffs Every 6 (Six) Hours As Needed for Wheezing.   Yes Provider, MD Santo   ALPRAZolam (XANAX) 0.5 MG tablet Take 0.5  "mg by mouth 3 (Three) Times a Day As Needed for Anxiety.   Yes Santo Horvath MD   cyclobenzaprine (FLEXERIL) 10 MG tablet Take 10 mg by mouth 3 (Three) Times a Day As Needed for Muscle Spasms.   Yes Santo Horvath MD   dicyclomine (BENTYL) 10 MG capsule Take 10 mg by mouth 4 (Four) Times a Day Before Meals & at Bedtime.   Yes Provider, Historical, MD   HYDROcodone-acetaminophen (NORCO)  MG per tablet Take 2 tablets by mouth 4 (Four) Times a Day As Needed for Moderate Pain .   Yes Provider, Historical, MD   losartan-hydrochlorothiazide (HYZAAR) 50-12.5 MG per tablet Take 1 tablet by mouth 2 (Two) Times a Day.   Yes Provider, Historical, MD   metoprolol succinate XL (TOPROL-XL) 50 MG 24 hr tablet Take 50 mg by mouth 2 (Two) Times a Day.   Yes Provider, Historical, MD   montelukast (SINGULAIR) 10 MG tablet Take 10 mg by mouth Daily.   Yes Santo Horvath MD     Objective     Vital Signs: /56 (BP Location: Left arm, Patient Position: Sitting)   Pulse 72   Temp 97.4 °F (36.3 °C) (Oral)   Resp 18   Ht 154.9 cm (61\")   Wt 59.9 kg (132 lb)   SpO2 96%   BMI 24.94 kg/m²    Physical Exam   Constitutional: She is oriented to person, place, and time. She appears well-developed and well-nourished.   HENT:   Head: Normocephalic and atraumatic.   Right Ear: External ear normal.   Left Ear: External ear normal.   Nose: Nose normal.   Mouth/Throat: Oropharynx is clear and moist.   Eyes: Conjunctivae and EOM are normal.   Neck: Normal range of motion. Neck supple.   Cardiovascular: Normal rate, regular rhythm and normal heart sounds.   Pulmonary/Chest: Effort normal and breath sounds normal.   Abdominal: Soft. Bowel sounds are normal. She exhibits no distension. There is no tenderness.   Musculoskeletal: Normal range of motion.   Neurological: She is alert and oriented to person, place, and time.   Skin: Skin is warm and dry.   Decreased turgor   Psychiatric: She has a normal mood and affect. " Her speech is normal and behavior is normal. Cognition and memory are normal.         Results Reviewed:  Lab Results (last 24 hours)     Procedure Component Value Units Date/Time    Comprehensive Metabolic Panel [[id number]]  (Abnormal) Collected:  09/26/19 1400    Specimen:  Blood Updated:  09/26/19 1504     Glucose 101 mg/dL      BUN 20 mg/dL      Creatinine 0.66 mg/dL      Sodium 121 mmol/L      Potassium 4.4 mmol/L      Chloride 85 mmol/L      CO2 23.0 mmol/L      Calcium 8.8 mg/dL      Total Protein 7.3 g/dL      Albumin 4.30 g/dL      ALT (SGPT) 12 U/L      AST (SGOT) 20 U/L      Alkaline Phosphatase 49 U/L      Total Bilirubin 0.4 mg/dL      eGFR Non African Amer 91 mL/min/1.73      Globulin 3.0 gm/dL      A/G Ratio 1.4 g/dL      BUN/Creatinine Ratio 30.3     Anion Gap 13.0 mmol/L     Narrative:       GFR Normal >60  Chronic Kidney Disease <60  Kidney Failure <15    CBC & Differential [[id number]] Collected:  09/26/19 1400    Specimen:  Blood Updated:  09/26/19 1441    Narrative:       The following orders were created for panel order CBC & Differential.  Procedure                               Abnormality         Status                     ---------                               -----------         ------                     CBC Auto Differential[[id number]]        Abnormal            Final result                 Please view results for these tests on the individual orders.    CBC Auto Differential [[id number]]  (Abnormal) Collected:  09/26/19 1400    Specimen:  Blood Updated:  09/26/19 1441     WBC 4.54 10*3/mm3      RBC 3.34 10*6/mm3      Hemoglobin 8.0 g/dL      Hematocrit 25.0 %      MCV 74.9 fL      MCH 24.0 pg      MCHC 32.0 g/dL      RDW 15.9 %      RDW-SD 42.9 fl      MPV 9.7 fL      Platelets 123 10*3/mm3     Manual Differential [[id number]]  (Abnormal) Collected:  09/26/19 1400    Specimen:  Blood Updated:  09/26/19 1441     Neutrophil % 76.0 %      Lymphocyte % 20.0 %      Monocyte % 2.0 %       Eosinophil % 1.0 %      Basophil % 1.0 %      Neutrophils Absolute 3.45 10*3/mm3      Lymphocytes Absolute 0.91 10*3/mm3      Monocytes Absolute 0.09 10*3/mm3      Eosinophils Absolute 0.05 10*3/mm3      Basophils Absolute 0.05 10*3/mm3      Anisocytosis Slight/1+     Hypochromia Mod/2+     WBC Morphology Normal     Platelet Estimate Decreased     Giant Platelets Mod/2+        Imaging Results (last 24 hours)     Procedure Component Value Units Date/Time    US Gallbladder [[id number]] Collected:  09/26/19 1523     Updated:  09/26/19 1529    Narrative:       ULTRASOUND GALLBLADDER 9/26/2019 2:10 PM CDT     REASON FOR EXAM: pain/nausea when eating       COMPARISON: NONE      TECHNIQUE: Multiple longitudinal and transverse realtime sonographic  images of the right upper quadrant of the abdomen are obtained.      FINDINGS:    Pancreas: Normal in size and echogenicity.      Liver: Normal in size, echogenicity and echotexture. No focal lesion.      Gallbladder: No intraluminal echoes, wall thickening, or pericholecystic  fluid.      Bile ducts: The CBD measures 0.5 cm in diameter. There is no  intrahepatic or extrahepatic ductal dilation.      .       Other: The visualized abdominal aorta is normal in caliber.        Impression:       Negative gallbladder ultrasound        This report was finalized on 09/26/2019 15:26 by Dr. Phillip Polo MD.        I have personally reviewed and interpreted the radiology studies and ECG obtained at time of admission.     Assessment / Plan     Assessment:   There are no active hospital problems to display for this patient.    1.  Hyponatremia   -IVF with NS    2.  Anemia  -monitor H&H  -will get hemoccult    3.  COPD  -duonebs    4.  HTN  -home meds      Code Status: Full     I discussed the patient's findings and my recommendations with the patient, patient's family    Estimated length of stay 2-3 days    Cameron Walker MD   09/26/19   4:42 PM              Electronically signed by Aaorn  Cameron Leon MD at 09/26/19 1647          Physician Progress Notes (most recent note)      Massimo Carlson MD at 09/30/19 1536              [facility] Medicine Services  INPATIENT PROGRESS NOTE    Length of Stay: 4  Date of Admission: 9/26/2019  Primary Care Physician: Jai Baum MD    Subjective   Chief Complaint: Weakness.    HPI   Patient still remains very weak.  Patient pain shortness of breath upon ambulating.  Patient does not require any oxygen.  Patient denies any chest pain.    Review of Systems   Constitutional: Positive for activity change, appetite change and fatigue. Negative for chills and fever.   HENT: Negative for hearing loss, nosebleeds, tinnitus and trouble swallowing.    Eyes: Negative for visual disturbance.   Respiratory: Negative for cough, chest tightness, shortness of breath and wheezing.    Cardiovascular: Negative for chest pain, palpitations and leg swelling.   Gastrointestinal: Negative for abdominal distention, abdominal pain, blood in stool, constipation, diarrhea, nausea and vomiting.   Endocrine: Negative for cold intolerance, heat intolerance, polydipsia, polyphagia and polyuria.   Genitourinary: Negative for decreased urine volume, difficulty urinating, dysuria, flank pain, frequency and hematuria.   Musculoskeletal: Positive for arthralgias, gait problem and myalgias. Negative for joint swelling.   Skin: Negative for rash.   Allergic/Immunologic: Negative for immunocompromised state.   Neurological: Positive for weakness. Negative for dizziness, syncope, light-headedness and headaches.   Hematological: Negative for adenopathy. Does not bruise/bleed easily.   Psychiatric/Behavioral: Negative for confusion and sleep disturbance. The patient is not nervous/anxious.           All pertinent negatives and positives are as above. All other systems have been reviewed and are negative unless otherwise stated.     Objective    Temp:  [97.5 °F  (36.4 °C)-98.6 °F (37 °C)] 98.6 °F (37 °C)  Heart Rate:  [78-93] 90  Resp:  [16-20] 20  BP: (123-149)/(51-66) 149/63    Intake/Output Summary (Last 24 hours) at 9/30/2019 1554  Last data filed at 9/30/2019 1502  Gross per 24 hour   Intake --   Output 2200 ml   Net -2200 ml     Physical Exam   Constitutional: She appears well-developed.   HENT:   Head: Normocephalic and atraumatic.   Eyes: Conjunctivae are normal. Pupils are equal, round, and reactive to light.   Neck: Neck supple. No JVD present. No thyromegaly present.   Cardiovascular: Normal rate, regular rhythm, normal heart sounds and intact distal pulses. Exam reveals no gallop and no friction rub.   No murmur heard.  Pulmonary/Chest: Effort normal. No respiratory distress. She has wheezes. She has no rales. She exhibits no tenderness.   Abdominal: Soft. Bowel sounds are normal. She exhibits no distension. There is no tenderness. There is no rebound and no guarding.   Musculoskeletal: Normal range of motion. She exhibits no edema, tenderness or deformity.   Lymphadenopathy:     She has no cervical adenopathy.   Neurological: She is alert. She displays normal reflexes. No cranial nerve deficit. She exhibits abnormal muscle tone. Coordination abnormal.   Skin: Skin is warm and dry. Capillary refill takes 2 to 3 seconds. No rash noted.   Psychiatric: She has a normal mood and affect. Her behavior is normal. Judgment and thought content normal.   Nursing note and vitals reviewed.      Results Review:  Lab Results (last 24 hours)     Procedure Component Value Units Date/Time    CBC & Differential [[id number]] Collected:  09/30/19 0307    Specimen:  Blood Updated:  09/30/19 0350    Narrative:       The following orders were created for panel order CBC & Differential.  Procedure                               Abnormality         Status                     ---------                               -----------         ------                     CBC Auto  Differential[[id number]]        Abnormal            Final result                 Please view results for these tests on the individual orders.    CBC Auto Differential [[id number]]  (Abnormal) Collected:  09/30/19 0307    Specimen:  Blood Updated:  09/30/19 0350     WBC 4.40 10*3/mm3      RBC 2.95 10*6/mm3      Hemoglobin 7.2 g/dL      Hematocrit 22.8 %      MCV 77.3 fL      MCH 24.4 pg      MCHC 31.6 g/dL      RDW 16.2 %      RDW-SD 46.2 fl      MPV 10.5 fL      Platelets 121 10*3/mm3     Manual Differential [[id number]]  (Abnormal) Collected:  09/30/19 0307    Specimen:  Blood Updated:  09/30/19 0350     Neutrophil % 71.8 %      Lymphocyte % 23.5 %      Monocyte % 1.2 %      Eosinophil % 2.4 %      Bands %  1.2 %      Neutrophils Absolute 3.21 10*3/mm3      Lymphocytes Absolute 1.03 10*3/mm3      Monocytes Absolute 0.05 10*3/mm3      Eosinophils Absolute 0.11 10*3/mm3      nRBC 3.5 /100 WBC      Anisocytosis Mod/2+     Hypochromia Slight/1+     Microcytes Mod/2+     WBC Morphology Normal     Giant Platelets Large/3+    Comprehensive Metabolic Panel [[id number]]  (Abnormal) Collected:  09/30/19 0307    Specimen:  Blood Updated:  09/30/19 0346     Glucose 126 mg/dL      BUN 11 mg/dL      Creatinine 0.67 mg/dL      Sodium 130 mmol/L      Potassium 3.8 mmol/L      Chloride 89 mmol/L      CO2 29.0 mmol/L      Calcium 8.7 mg/dL      Total Protein 7.3 g/dL      Albumin 4.00 g/dL      ALT (SGPT) 14 U/L      AST (SGOT) 22 U/L      Alkaline Phosphatase 64 U/L      Total Bilirubin 0.3 mg/dL      eGFR Non African Amer 89 mL/min/1.73      Globulin 3.3 gm/dL      A/G Ratio 1.2 g/dL      BUN/Creatinine Ratio 16.4     Anion Gap 12.0 mmol/L     Narrative:       GFR Normal >60  Chronic Kidney Disease <60  Kidney Failure <15           Cultures:       Radiology Data:    Imaging Results (last 24 hours)     ** No results found for the last 24 hours. **          Allergies   Allergen Reactions   • Sulfa Antibiotics Hives       Scheduled  meds:     cosyntropin 0.25 mg Intravenous Once   dicyclomine 10 mg Oral 4x Daily AC & at Bedtime   ipratropium-albuterol 3 mL Nebulization 4x Daily - RT   losartan-HCTZ (HYZAAR) 50-12.5 combo dose  Oral Daily   metoprolol succinate XL 50 mg Oral Q24H   montelukast 10 mg Oral Daily   pantoprazole 40 mg Oral Q AM       PRN meds:  •  acetaminophen  •  acetaminophen  •  ALPRAZolam  •  cyclobenzaprine  •  HYDROcodone-acetaminophen  •  ondansetron  •  ondansetron    Assessment/Plan       Hyponatremia    Anemia    COPD (chronic obstructive pulmonary disease) (CMS/[facility])    Chronic pain      Plan:  Hyponatremia.  Improving.  Elbe work-up.    Pulmonary edema.  Patient not requiring oxygen.    Anemia.  Hemoccult pending. Start venofer x 2 days 19.     COPD.  Continue duo nebs.  Continue Singulair.    Hypertension.  Continue Cozaar.  Toprol.    IBS.  Continue Bentyl.    Anxiety.  Continue Xanax as needed.    Nausea.  Continue Protonix.  Zofran as needed.    Arthritis/chronic pain. Continue Norco PRN.  Continue Flexeril as needed.    Nutrition.  Regular diet.    Deconditioning.  PT consult.    Discharge Plannin-2 days.  Patient go home with duo nebs.  Patient also go home with home health.    Massimo Carlson MD   19   3:54 PM                    Electronically signed by Massimo Carlson MD at 19 1612       Consult Notes (most recent note)     No notes of this type exist for this encounter.           Respiratory Therapy Notes (most recent note)      Victoria Mccullough, RRT at 10/01/19 1518          Exercise Oximetry    Patient Name:Muriel Hernandez   MRN: [id number]   Date: 10/01/19             ROOM AIR BASELINE   SpO2%   82   Heart Rate  87   Blood Pressure      EXERCISE ON ROOM AIR SpO2% EXERCISE ON O2 @ 2 LPM SpO2%   1 MINUTE  1 MINUTE    2 MINUTES  2 MINUTES   93   3 MINUTES  3 MINUTES      4 MINUTES  4 MINUTES    5 MINUTES  5 MINUTES    6 MINUTES  6 MINUTES               Distance Walked   Distance  Walked    175 FEET   Dyspnea (Jacqueline Scale)   Dyspnea (Jacqueline Scale)   8 PER PT   Fatigue (Jacqueline Scale)   Fatigue (Jacqueline Scale)   SpO2% Post Exercise   SpO2% Post Exercise    94   HR Post Exercise   HR Post Exercise   98   Time to Recovery   Time to Recovery  2 MIN     Comments: ENCOURAGED PT TO DO PURSED LIP BREATHING WITH EXERTION    Electronically signed by Victoria Mccullough RRT at 10/01/19 1521          Discharge Summary      Massimo Carlson MD at 10/01/19 1414              [facility] Medicine Services  DISCHARGE SUMMARY       Date of Admission: 9/26/2019  Date of Discharge:  10/1/2019  Primary Care Physician: Jai Baum MD    Presenting Problem/History of Present Illness:  Hyponatremia [E87.1]     Final Discharge Diagnoses:  Active Hospital Problems    Diagnosis   • Anemia   • COPD (chronic obstructive pulmonary disease) (CMS/HCC)   • Chronic pain   • Hyponatremia       Pertinent Test Results:   IMPRESSION: Chest x-ray  1. Bilateral pulmonary opacities are favorable for edema. Multifocal  pneumonia considered.    IMPRESSION: Ultrasound gallbladder.  Negative gallbladder ultrasound    Chief Complaint on Day of Discharge: none    History of Present Illness on Day of Discharge:   Mrs. Hernandez is a 59 year old lady with a history of asthma and arthritis.  She presents with 1 year of generalized weakness.  She says this has gotten worse in the last few weeks.  She says she gets full easily and has a hard time eating much.  She says she has occasional abdominal pain.  She gets nauseated at times.  She has not vomited.  She denies black or bloody stools.       Hospital Course:  The patient is a 59 y.o. female who [facility] with COPD exacerbation/hyponatremia/chronic smoker.      Hyponatremia.  Improving.     Sodium level today is 130.    Pulmonary edema.  Improved with Lasix.     Anemia.     Hemoglobin stable.  No sign of acute bleed.  Patient got Venofer x  "2 days.     COPD.     Patient to continue duo nebs.     Patient to continue Singulair.     Hypertension.     Patient to continue Cozaar and Toprol.     IBS.     Patient to continue Bentyl.     Anxiety.     Patient to continue Xanax as needed.    Chronic smoker.  Patient initially lies to us about smoking.  She then admit to the nurse that she is  chronic smoker.  Discussed consult patient cutting back and stopping.     Nausea.     Patient to continue Protonix.       Arthritis/chronic pain.  Patient to continue Norco PRN.       Vital signs stable, afebrile. Patient go home with duo nebs.  Patient also go home with home health.  Patient also go home with a walker.  She will need home oxygen.  Follow with PCP 1 week.    Condition on Discharge:  stable    Physical Exam on Discharge:  /62 (BP Location: Left arm, Patient Position: Sitting)   Pulse 86   Temp 97.5 °F (36.4 °C) (Oral)   Resp 18   Ht 154.9 cm (61\")   Wt 65.8 kg (145 lb 1 oz)   SpO2 98%   BMI 27.41 kg/m²    Physical Exam  Constitutional: She appears well-developed.   HENT:   Head: Normocephalic and atraumatic.   Eyes: Conjunctivae are normal. Pupils are equal, round, and reactive to light.   Neck: Neck supple. No JVD present. No thyromegaly present.   Cardiovascular: Normal rate, regular rhythm, normal heart sounds and intact distal pulses. Exam reveals no gallop and no friction rub.   No murmur heard.  Pulmonary/Chest: Effort normal. No respiratory distress.  Diminished breath sound bilateral, clear.  She has no rales. She exhibits no tenderness.   Abdominal: Soft. Bowel sounds are normal. She exhibits no distension. There is no tenderness. There is no rebound and no guarding.   Musculoskeletal: Normal range of motion. She exhibits no edema, tenderness or deformity.   Lymphadenopathy:     She has no cervical adenopathy.   Neurological: She is alert. She displays normal reflexes. No cranial nerve deficit. She exhibits abnormal muscle tone. " Coordination abnormal.   Skin: Skin is warm and dry. Capillary refill takes 2 to 3 seconds. No rash noted.   Psychiatric: She has a normal mood and affect. Her behavior is normal. Judgment and thought content normal.   Nursing note and vitals reviewed.    Discharge Disposition: Discharge home with home health.  Patient go home with oxygen.  Duo nebs nebulizer.  A walker.  Home or Self Care    Discharge Medications:     Discharge Medications      New Medications      Instructions Start Date   ipratropium-albuterol 0.5-2.5 mg/3 ml nebulizer  Commonly known as:  DUO-NEB   3 mL, Nebulization, 4 Times Daily - RT      pantoprazole 40 MG EC tablet  Commonly known as:  PROTONIX   40 mg, Oral, Daily         Continue These Medications      Instructions Start Date   albuterol sulfate  (90 Base) MCG/ACT inhaler  Commonly known as:  PROVENTIL HFA;VENTOLIN HFA;PROAIR HFA   2 puffs, Inhalation, Every 6 Hours PRN      ALPRAZolam 0.5 MG tablet  Commonly known as:  XANAX   0.5 mg, Oral, 3 Times Daily PRN      dicyclomine 10 MG capsule  Commonly known as:  BENTYL   10 mg, Oral, 4 Times Daily Before Meals & Nightly      HYDROcodone-acetaminophen  MG per tablet  Commonly known as:  NORCO   2 tablets, Oral, 4 Times Daily PRN      losartan-hydrochlorothiazide 100-25 MG per tablet  Commonly known as:  HYZAAR   1 tablet, Oral, 2 Times Daily      metoprolol succinate XL 50 MG 24 hr tablet  Commonly known as:  TOPROL-XL   100 mg, Oral, 2 Times Daily      montelukast 10 MG tablet  Commonly known as:  SINGULAIR   10 mg, Oral, Daily at 1100         Stop These Medications    cyclobenzaprine 10 MG tablet  Commonly known as:  FLEXERIL     estradiol 1 MG tablet  Commonly known as:  ESTRACE            Discharge Diet:   Diet Instructions     Advance Diet As Tolerated            Activity at Discharge:   Activity Instructions     Activity as Tolerated            Discharge Care Plan/Instructions: She will go home with home  "health.    Follow-up Appointments:   Follow with PCP 1 week.    Massimo Carlson MD  10/01/19  2:34 PM    Time: Greater than 30 minutes.        Electronically signed by Massimo Carlson MD at 10/01/19 9314       [address] [address]  Dept. Phone:  [phone]  Dept. Fax:   Date Ordered: Oct 1, 2019         Patient:  Muriel Hernandez MRN:  [id number]   [address]  [facility] [address] :  1960  SSN:    Phone: [phone] Sex:  F     Weight: 65.8 kg (145 lb 1 oz)         Ht Readings from Last 1 Encounters:   19 154.9 cm (61\")         Walker               (Order ID: [id number])    Diagnosis:  Impaired functional mobility and activity tolerance (Z74.09 [ICD-10-CM] V49.89 [ICD-9-CM])  Chronic obstructive pulmonary disease, unspecified COPD type (CMS/HCC) (J44.9 [ICD-10-CM] 496 [ICD-9-CM])  Hyponatremia (E87.1 [ICD-10-CM] 276.1 [ICD-9-CM])  Anemia, unspecified type (D64.9 [ICD-10-CM] 285.9 [ICD-9-CM])  Chronic obstructive pulmonary disease with acute exacerbation (CMS/HCC) (J44.1 [ICD-10-CM] 491.21 [ICD-9-CM])  Other chronic pain (G89.29 [ICD-10-CM] 338.29 [ICD-9-CM])   Quantity:  1     Equipment:  Standard Walker Folding  Length of Need (99 Months = Lifetime): 99 Months = Lifetime        Authorizing Provider's Phone: [phone]   Authorizing Provider:Massimo Carlson MD  Authorizing Provider's NPI: [id number]  Order Entered By: Massimo Carlson MD 10/1/2019  2:33 PM     Electronically signed by: Massimo Carlson MD 10/1/2019  2:33 PM        [address]  [address] [place] [id number]  Dept. Phone:  [phone]  Dept. Fax:   Date Ordered: Oct 1, 2019         Patient:  Muriel Hernandez MRN:  [id number]   [address]  [facility] [address] :  1960  SSN:    Phone: [phone] Sex:  F     Weight: 65.8 kg (145 lb 1 oz)         Ht Readings from Last 1 Encounters:   19 154.9 " "cm (61\")         Home Oxygen Therapy          (Order ID: [id number])    Diagnosis:  Chronic obstructive pulmonary disease, unspecified COPD type (CMS/HCC) (J44.9 [ICD-10-CM] 496 [ICD-9-CM])   Quantity:  1     Delivery Modality: Nasal Cannula  Liters Per Minute: 2  Duration: Continuous  Equipment:  Oxygen Concentrator &  &  Portable Gaseous Oxygen System & Portable Oxygen Contents Gaseous &  Conserving Regulator  Length of Need (99 Months = Lifetime): 99 Months = Lifetime        Authorizing Provider's Phone: [phone]   Authorizing Provider:Massimo Carlson MD  Authorizing Provider's NPI: [id number]  Order Entered By: Massimo Carlson MD 10/1/2019  2:33 PM     Electronically signed by: Massimo Carlson MD 10/1/2019  2:33 PM        [address] [address]  Dept. Phone:  [phone]  Dept. Fax:   Date Ordered: Oct 1, 2019         Patient:  Muriel Hernandez MRN:  [id number]   [address] [facility] [address] :  1960  SSN:    Phone: [phone] Sex:  F     Weight: 65.8 kg (145 lb 1 oz)         Ht Readings from Last 1 Encounters:   19 154.9 cm (61\")         Home Nebulizer          (Order ID: [id number])    Diagnosis:  Impaired functional mobility and activity tolerance (Z74.09 [ICD-10-CM] V49.89 [ICD-9-CM])  Chronic obstructive pulmonary disease, unspecified COPD type (CMS/HCC) (J44.9 [ICD-10-CM] 496 [ICD-9-CM])  Hyponatremia (E87.1 [ICD-10-CM] 276.1 [ICD-9-CM])  Anemia, unspecified type (D64.9 [ICD-10-CM] 285.9 [ICD-9-CM])  Chronic obstructive pulmonary disease with acute exacerbation (CMS/HCC) (J44.1 [ICD-10-CM] 491.21 [ICD-9-CM])  Other chronic pain (G89.29 [ICD-10-CM] 338.29 [ICD-9-CM])   Quantity:  1     Nebulizer Equipment:  Nebulizer w/ Compressor  Nebulizer Accessories:  Nebulizer Kit - Administration Set, Non-Disposable  Length of Need (99 Months = Lifetime): 99 Months = Lifetime        Authorizing " Provider's Phone: [phone]   Authorizing Provider:Massimo Carlson MD  Authorizing Provider's NPI: [id number]  Order Entered By: Massimo Carlson MD 10/1/2019  2:33 PM     Electronically signed by: Massimo Carlson MD 10/1/2019  2:33 PM

## 2019-10-01 NOTE — PLAN OF CARE
Problem: Patient Care Overview  Goal: Plan of Care Review  Outcome: Ongoing (interventions implemented as appropriate)   10/01/19 0608   Coping/Psychosocial   Plan of Care Reviewed With patient   Plan of Care Review   Progress no change   OTHER   Outcome Summary Pt. continues to c/o of back and cyatic nerve pain. Medicated per MAR. Up to bathroom with walker and standby assist. 2L O2 placed d/t O2 sats in 80's. Sinus 76-96.       Problem: Anemia (Adult)  Goal: Symptom Improvement  Outcome: Ongoing (interventions implemented as appropriate)   10/01/19 0608   Anemia (Adult)   Symptom Improvement making progress toward outcome       Problem: Pain, Chronic (Adult)  Goal: Acceptable Pain/Comfort Level and Functional Ability  Outcome: Ongoing (interventions implemented as appropriate)   10/01/19 0608   Pain, Chronic (Adult)   Acceptable Pain/Comfort Level and Functional Ability making progress toward outcome       Problem: Fall Risk (Adult)  Goal: Absence of Fall  Outcome: Ongoing (interventions implemented as appropriate)   10/01/19 0608   Fall Risk (Adult)   Absence of Fall achieves outcome

## 2019-10-01 NOTE — PROGRESS NOTES
Exercise Oximetry    Patient Name:Muriel Hernandez   MRN: [id number]   Date: 10/01/19             ROOM AIR BASELINE   SpO2%   82   Heart Rate  87   Blood Pressure      EXERCISE ON ROOM AIR SpO2% EXERCISE ON O2 @ 2 LPM SpO2%   1 MINUTE  1 MINUTE    2 MINUTES  2 MINUTES   93   3 MINUTES  3 MINUTES      4 MINUTES  4 MINUTES    5 MINUTES  5 MINUTES    6 MINUTES  6 MINUTES               Distance Walked   Distance Walked    175 FEET   Dyspnea (Jacqueline Scale)   Dyspnea (Jacqueline Scale)   8 PER PT   Fatigue (Jacqueline Scale)   Fatigue (Jacqueline Scale)   SpO2% Post Exercise   SpO2% Post Exercise    94   HR Post Exercise   HR Post Exercise   98   Time to Recovery   Time to Recovery  2 MIN     Comments: ENCOURAGED PT TO DO PURSED LIP BREATHING WITH EXERTION

## 2019-10-02 ENCOUNTER — READMISSION MANAGEMENT (OUTPATIENT)
Dept: CALL CENTER | Facility: HOSPITAL | Age: 59
End: 2019-10-02

## 2019-10-02 LAB — ACTH PLAS-MCNC: 11.4 PG/ML (ref 7.2–63.3)

## 2019-10-02 NOTE — OUTREACH NOTE
Prep Survey      Responses   Facility patient discharged from?  [facility]   Is patient eligible?  Yes   Discharge diagnosis  Anemia, COPD, chronic pain, hyponatremia   Does the patient have one of the following disease processes/diagnoses(primary or secondary)?  COPD/Pneumonia   Does the patient have Home health ordered?  Yes   What is the Home health agency?   [facility]   Is there a DME ordered?  Yes   What DME was ordered?  Walker and nebulizer and home O2 from [facility]   Comments regarding appointments  Pt to schedule follow up with PCP   Prep survey completed?  Yes          Janeth Dailey RN

## 2019-10-03 ENCOUNTER — READMISSION MANAGEMENT (OUTPATIENT)
Dept: CALL CENTER | Facility: HOSPITAL | Age: 59
End: 2019-10-03

## 2019-10-03 NOTE — OUTREACH NOTE
COPD/PN Week 1 Survey      Responses   Facility patient discharged from?  [facility]   Does the patient have one of the following disease processes/diagnoses(primary or secondary)?  COPD/Pneumonia   Is there a successful TCM telephone encounter documented?  No   Was the primary reason for admission:  COPD exacerbation   Week 1 attempt successful?  No   Unsuccessful attempts  Attempt 1          Rand Batres RN

## 2019-10-03 NOTE — THERAPY DISCHARGE NOTE
Acute Care - Physical Therapy Discharge Summary  [facility]       Patient Name: Muriel Hernandez  : 1960  MRN: [id number]    Today's Date: 10/3/2019                 Admit Date: 2019      PT Recommendation and Plan    Visit Dx:    ICD-10-CM ICD-9-CM   1. Chronic obstructive pulmonary disease, unspecified COPD type (CMS/[facility]) J44.9 496   2. Impaired functional mobility and activity tolerance Z74.09 V49.89   3. Hyponatremia E87.1 276.1   4. Anemia, unspecified type D64.9 285.9   5. Chronic obstructive pulmonary disease with acute exacerbation (CMS/[facility]) J44.1 491.21   6. Other chronic pain G89.29 338.29   7. Chronic obstructive pulmonary disease with acute lower respiratory infection (CMS/[facility]) J44.0 496     519.8               Rehab Goal Summary     Row Name 10/03/19 1359             Bed Mobility Goal 1 (PT)    Activity/Assistive Device (Bed Mobility Goal 1, PT)  bed mobility activities, all  -NW      Landing Level/Cues Needed (Bed Mobility Goal 1, PT)  independent  -NW      Time Frame (Bed Mobility Goal 1, PT)  long term goal (LTG);by discharge  -NW      Progress/Outcomes (Bed Mobility Goal 1, PT)  goal not met  -NW         Transfer Goal 1 (PT)    Activity/Assistive Device (Transfer Goal 1, PT)  transfers, all  -NW      Landing Level/Cues Needed (Transfer Goal 1, PT)  independent  -NW      Time Frame (Transfer Goal 1, PT)  long term goal (LTG);by discharge  -NW      Progress/Outcome (Transfer Goal 1, PT)  goal met  -NW         Gait Training Goal 1 (PT)    Activity/Assistive Device (Gait Training Goal 1, PT)  gait (walking locomotion);decrease fall risk;improve balance and speed;increase endurance/gait distance  -NW      Landing Level (Gait Training Goal 1, PT)  independent  -NW      Distance (Gait Goal 1, PT)  400ft  -NW      Time Frame (Gait Training Goal 1, PT)  long term goal (LTG);by discharge  -NW      Progress/Outcome (Gait Training Goal 1, PT)  goal ongoing  -NW        User Key   (r) = Recorded By, (t) = Taken By, (c) = Cosigned By    Initials Name Provider Type Discipline    NW Marilu Alonzo PTA Physical Therapy Assistant PT              PT Discharge Summary  Anticipated Discharge Disposition (PT): home  Reason for Discharge: Discharge from facility  Outcomes Achieved: Refer to plan of care for updates on goals achieved  Discharge Destination: Home      Marilu Alonzo PTA   10/3/2019

## 2019-10-04 ENCOUNTER — READMISSION MANAGEMENT (OUTPATIENT)
Dept: CALL CENTER | Facility: HOSPITAL | Age: 59
End: 2019-10-04

## 2019-10-04 NOTE — OUTREACH NOTE
COPD/PN Week 1 Survey      Responses   Facility patient discharged from?  [facility]   Does the patient have one of the following disease processes/diagnoses(primary or secondary)?  COPD/Pneumonia   Is there a successful TCM telephone encounter documented?  No   Was the primary reason for admission:  COPD exacerbation   Week 1 attempt successful?  No   Unsuccessful attempts  Attempt 2          Janeth Calhoun RN

## 2019-10-09 ENCOUNTER — READMISSION MANAGEMENT (OUTPATIENT)
Dept: CALL CENTER | Facility: HOSPITAL | Age: 59
End: 2019-10-09

## 2019-10-09 NOTE — OUTREACH NOTE
COPD/PN Week 2 Survey      Responses   Facility patient discharged from?  [facility]   Does the patient have one of the following disease processes/diagnoses(primary or secondary)?  COPD/Pneumonia   Was the primary reason for admission:  COPD exacerbation   Week 2 attempt successful?  No   Unsuccessful attempts  Attempt 1          Ofe Magaña RN

## 2019-10-10 ENCOUNTER — READMISSION MANAGEMENT (OUTPATIENT)
Dept: CALL CENTER | Facility: HOSPITAL | Age: 59
End: 2019-10-10

## 2019-10-10 NOTE — OUTREACH NOTE
COPD/PN Week 2 Survey      Responses   Facility patient discharged from?  [facility]   Does the patient have one of the following disease processes/diagnoses(primary or secondary)?  COPD/Pneumonia   Was the primary reason for admission:  COPD exacerbation   Week 2 attempt successful?  No   Unsuccessful attempts  Attempt 2          Kayleigh Magallanes RN

## 2020-01-01 ENCOUNTER — HOSPITAL ENCOUNTER (INPATIENT)
Facility: HOSPITAL | Age: 60
LOS: 5 days | End: 2020-11-17
Attending: EMERGENCY MEDICINE | Admitting: INTERNAL MEDICINE

## 2020-01-01 ENCOUNTER — APPOINTMENT (OUTPATIENT)
Dept: GENERAL RADIOLOGY | Facility: HOSPITAL | Age: 60
End: 2020-01-01

## 2020-01-01 ENCOUNTER — ANESTHESIA (OUTPATIENT)
Dept: PERIOP | Facility: HOSPITAL | Age: 60
End: 2020-01-01

## 2020-01-01 ENCOUNTER — OFFICE VISIT (OUTPATIENT)
Dept: OTOLARYNGOLOGY | Facility: CLINIC | Age: 60
End: 2020-01-01

## 2020-01-01 ENCOUNTER — APPOINTMENT (OUTPATIENT)
Dept: CT IMAGING | Facility: HOSPITAL | Age: 60
End: 2020-01-01

## 2020-01-01 ENCOUNTER — HOSPITAL ENCOUNTER (OUTPATIENT)
Dept: RADIATION ONCOLOGY | Facility: HOSPITAL | Age: 60
Setting detail: RADIATION/ONCOLOGY SERIES
End: 2020-01-01

## 2020-01-01 ENCOUNTER — OFFICE VISIT (OUTPATIENT)
Dept: CARDIOLOGY | Facility: CLINIC | Age: 60
End: 2020-01-01

## 2020-01-01 ENCOUNTER — APPOINTMENT (OUTPATIENT)
Dept: CARDIOLOGY | Facility: HOSPITAL | Age: 60
End: 2020-01-01

## 2020-01-01 ENCOUNTER — LAB (OUTPATIENT)
Dept: LAB | Facility: HOSPITAL | Age: 60
End: 2020-01-01

## 2020-01-01 ENCOUNTER — APPOINTMENT (OUTPATIENT)
Dept: ULTRASOUND IMAGING | Facility: HOSPITAL | Age: 60
End: 2020-01-01

## 2020-01-01 ENCOUNTER — APPOINTMENT (OUTPATIENT)
Dept: PREADMISSION TESTING | Facility: HOSPITAL | Age: 60
End: 2020-01-01

## 2020-01-01 ENCOUNTER — TELEPHONE (OUTPATIENT)
Dept: OTOLARYNGOLOGY | Facility: CLINIC | Age: 60
End: 2020-01-01

## 2020-01-01 ENCOUNTER — HOSPITAL ENCOUNTER (OUTPATIENT)
Dept: CARDIOLOGY | Facility: HOSPITAL | Age: 60
Discharge: HOME | End: 2020-07-27

## 2020-01-01 ENCOUNTER — HOSPITAL ENCOUNTER (OUTPATIENT)
Dept: ULTRASOUND IMAGING | Facility: HOSPITAL | Age: 60
Discharge: HOME | End: 2020-07-27
Admitting: INTERNAL MEDICINE

## 2020-01-01 ENCOUNTER — TRANSCRIBE ORDERS (OUTPATIENT)
Dept: ADMINISTRATIVE | Facility: HOSPITAL | Age: 60
End: 2020-01-01

## 2020-01-01 ENCOUNTER — OFFICE VISIT (OUTPATIENT)
Dept: NEUROLOGY | Facility: CLINIC | Age: 60
End: 2020-01-01

## 2020-01-01 ENCOUNTER — HOSPITAL ENCOUNTER (OUTPATIENT)
Facility: HOSPITAL | Age: 60
Setting detail: HOSPITAL OUTPATIENT SURGERY
Discharge: HOME | End: 2020-08-04
Attending: OTOLARYNGOLOGY | Admitting: OTOLARYNGOLOGY

## 2020-01-01 ENCOUNTER — APPOINTMENT (OUTPATIENT)
Dept: LAB | Facility: HOSPITAL | Age: 60
End: 2020-01-01

## 2020-01-01 ENCOUNTER — HOSPITAL ENCOUNTER (OUTPATIENT)
Dept: CARDIOLOGY | Facility: HOSPITAL | Age: 60
Setting detail: HOSPITAL OUTPATIENT SURGERY
Discharge: HOME | End: 2020-08-19
Admitting: INTERNAL MEDICINE

## 2020-01-01 ENCOUNTER — TELEPHONE (OUTPATIENT)
Dept: CARDIOLOGY | Facility: CLINIC | Age: 60
End: 2020-01-01

## 2020-01-01 ENCOUNTER — ANESTHESIA EVENT (OUTPATIENT)
Dept: PERIOP | Facility: HOSPITAL | Age: 60
End: 2020-01-01

## 2020-01-01 ENCOUNTER — HOSPITAL ENCOUNTER (OUTPATIENT)
Facility: HOSPITAL | Age: 60
Setting detail: HOSPITAL OUTPATIENT SURGERY
Discharge: HOME | End: 2020-09-15
Attending: OTOLARYNGOLOGY | Admitting: OTOLARYNGOLOGY

## 2020-01-01 ENCOUNTER — HOSPITAL ENCOUNTER (EMERGENCY)
Facility: HOSPITAL | Age: 60
Discharge: HOME | End: 2020-09-25
Attending: FAMILY MEDICINE | Admitting: FAMILY MEDICINE

## 2020-01-01 ENCOUNTER — CONSULT (OUTPATIENT)
Dept: RADIATION ONCOLOGY | Facility: HOSPITAL | Age: 60
End: 2020-01-01

## 2020-01-01 ENCOUNTER — HOSPITAL ENCOUNTER (OUTPATIENT)
Dept: GENERAL RADIOLOGY | Facility: HOSPITAL | Age: 60
Discharge: HOME | End: 2020-07-28
Admitting: OTOLARYNGOLOGY

## 2020-01-01 ENCOUNTER — HOSPITAL ENCOUNTER (OUTPATIENT)
Dept: MRI IMAGING | Facility: HOSPITAL | Age: 60
Discharge: HOME | End: 2020-06-10
Admitting: RADIOLOGY

## 2020-01-01 VITALS
OXYGEN SATURATION: 96 % | HEIGHT: 60 IN | DIASTOLIC BLOOD PRESSURE: 60 MMHG | WEIGHT: 134 LBS | SYSTOLIC BLOOD PRESSURE: 106 MMHG | HEART RATE: 65 BPM | BODY MASS INDEX: 26.31 KG/M2

## 2020-01-01 VITALS
HEART RATE: 78 BPM | BODY MASS INDEX: 27.09 KG/M2 | HEIGHT: 60 IN | OXYGEN SATURATION: 92 % | RESPIRATION RATE: 18 BRPM | DIASTOLIC BLOOD PRESSURE: 58 MMHG | WEIGHT: 138 LBS | SYSTOLIC BLOOD PRESSURE: 100 MMHG

## 2020-01-01 VITALS
HEIGHT: 61 IN | TEMPERATURE: 97.2 F | DIASTOLIC BLOOD PRESSURE: 62 MMHG | HEART RATE: 79 BPM | SYSTOLIC BLOOD PRESSURE: 117 MMHG | WEIGHT: 156 LBS | BODY MASS INDEX: 29.45 KG/M2

## 2020-01-01 VITALS
WEIGHT: 134 LBS | WEIGHT: 140 LBS | SYSTOLIC BLOOD PRESSURE: 145 MMHG | HEART RATE: 78 BPM | TEMPERATURE: 98 F | RESPIRATION RATE: 20 BRPM | DIASTOLIC BLOOD PRESSURE: 73 MMHG | SYSTOLIC BLOOD PRESSURE: 137 MMHG | HEIGHT: 60 IN | BODY MASS INDEX: 26.31 KG/M2 | RESPIRATION RATE: 20 BRPM | TEMPERATURE: 98 F | HEART RATE: 64 BPM | DIASTOLIC BLOOD PRESSURE: 80 MMHG | HEIGHT: 60 IN | BODY MASS INDEX: 27.48 KG/M2

## 2020-01-01 VITALS — HEART RATE: 74 BPM | DIASTOLIC BLOOD PRESSURE: 64 MMHG | SYSTOLIC BLOOD PRESSURE: 104 MMHG | TEMPERATURE: 96.8 F

## 2020-01-01 VITALS
TEMPERATURE: 94.8 F | BODY MASS INDEX: 28.06 KG/M2 | OXYGEN SATURATION: 44 % | WEIGHT: 142.9 LBS | SYSTOLIC BLOOD PRESSURE: 68 MMHG | HEIGHT: 60 IN | DIASTOLIC BLOOD PRESSURE: 11 MMHG | RESPIRATION RATE: 41 BRPM

## 2020-01-01 VITALS
SYSTOLIC BLOOD PRESSURE: 99 MMHG | RESPIRATION RATE: 16 BRPM | DIASTOLIC BLOOD PRESSURE: 85 MMHG | TEMPERATURE: 98.2 F | OXYGEN SATURATION: 100 % | HEART RATE: 79 BPM

## 2020-01-01 VITALS
HEIGHT: 60 IN | SYSTOLIC BLOOD PRESSURE: 138 MMHG | BODY MASS INDEX: 27.48 KG/M2 | WEIGHT: 140 LBS | TEMPERATURE: 98 F | RESPIRATION RATE: 20 BRPM | HEART RATE: 80 BPM | DIASTOLIC BLOOD PRESSURE: 76 MMHG

## 2020-01-01 VITALS
WEIGHT: 156.97 LBS | RESPIRATION RATE: 16 BRPM | SYSTOLIC BLOOD PRESSURE: 120 MMHG | BODY MASS INDEX: 30.82 KG/M2 | OXYGEN SATURATION: 87 % | DIASTOLIC BLOOD PRESSURE: 67 MMHG | HEART RATE: 77 BPM | HEIGHT: 60 IN

## 2020-01-01 VITALS
DIASTOLIC BLOOD PRESSURE: 58 MMHG | HEIGHT: 60 IN | WEIGHT: 145 LBS | SYSTOLIC BLOOD PRESSURE: 128 MMHG | BODY MASS INDEX: 28.47 KG/M2

## 2020-01-01 VITALS
DIASTOLIC BLOOD PRESSURE: 76 MMHG | WEIGHT: 156 LBS | SYSTOLIC BLOOD PRESSURE: 138 MMHG | TEMPERATURE: 98 F | RESPIRATION RATE: 20 BRPM | HEART RATE: 80 BPM | HEIGHT: 60 IN | BODY MASS INDEX: 30.63 KG/M2

## 2020-01-01 VITALS
RESPIRATION RATE: 18 BRPM | HEIGHT: 60 IN | WEIGHT: 138.8 LBS | BODY MASS INDEX: 27.25 KG/M2 | HEART RATE: 80 BPM | DIASTOLIC BLOOD PRESSURE: 60 MMHG | SYSTOLIC BLOOD PRESSURE: 80 MMHG

## 2020-01-01 VITALS
BODY MASS INDEX: 27.09 KG/M2 | HEIGHT: 60 IN | HEART RATE: 60 BPM | WEIGHT: 138 LBS | DIASTOLIC BLOOD PRESSURE: 42 MMHG | OXYGEN SATURATION: 98 % | SYSTOLIC BLOOD PRESSURE: 122 MMHG

## 2020-01-01 VITALS
WEIGHT: 156 LBS | SYSTOLIC BLOOD PRESSURE: 145 MMHG | DIASTOLIC BLOOD PRESSURE: 80 MMHG | HEART RATE: 85 BPM | TEMPERATURE: 98 F | HEIGHT: 60 IN | RESPIRATION RATE: 20 BRPM | BODY MASS INDEX: 30.63 KG/M2

## 2020-01-01 VITALS
HEIGHT: 61 IN | BODY MASS INDEX: 29.45 KG/M2 | WEIGHT: 156 LBS | SYSTOLIC BLOOD PRESSURE: 110 MMHG | DIASTOLIC BLOOD PRESSURE: 52 MMHG | HEART RATE: 74 BPM

## 2020-01-01 VITALS
HEIGHT: 60 IN | WEIGHT: 153.2 LBS | TEMPERATURE: 97.4 F | DIASTOLIC BLOOD PRESSURE: 69 MMHG | HEART RATE: 72 BPM | BODY MASS INDEX: 30.08 KG/M2 | SYSTOLIC BLOOD PRESSURE: 146 MMHG

## 2020-01-01 VITALS
BODY MASS INDEX: 29.45 KG/M2 | RESPIRATION RATE: 19 BRPM | WEIGHT: 156 LBS | HEART RATE: 68 BPM | TEMPERATURE: 97.6 F | OXYGEN SATURATION: 95 % | DIASTOLIC BLOOD PRESSURE: 72 MMHG | HEIGHT: 61 IN | SYSTOLIC BLOOD PRESSURE: 121 MMHG

## 2020-01-01 VITALS
BODY MASS INDEX: 27.09 KG/M2 | WEIGHT: 138 LBS | TEMPERATURE: 98 F | SYSTOLIC BLOOD PRESSURE: 135 MMHG | HEART RATE: 85 BPM | DIASTOLIC BLOOD PRESSURE: 78 MMHG | RESPIRATION RATE: 20 BRPM | HEIGHT: 60 IN

## 2020-01-01 VITALS
SYSTOLIC BLOOD PRESSURE: 118 MMHG | HEIGHT: 60 IN | BODY MASS INDEX: 27.09 KG/M2 | DIASTOLIC BLOOD PRESSURE: 68 MMHG | WEIGHT: 138 LBS | TEMPERATURE: 96.8 F | HEART RATE: 79 BPM

## 2020-01-01 VITALS
BODY MASS INDEX: 26.32 KG/M2 | HEIGHT: 60 IN | SYSTOLIC BLOOD PRESSURE: 133 MMHG | WEIGHT: 134.04 LBS | DIASTOLIC BLOOD PRESSURE: 52 MMHG

## 2020-01-01 VITALS
SYSTOLIC BLOOD PRESSURE: 107 MMHG | HEART RATE: 71 BPM | BODY MASS INDEX: 27.09 KG/M2 | RESPIRATION RATE: 18 BRPM | DIASTOLIC BLOOD PRESSURE: 68 MMHG | WEIGHT: 138 LBS | HEIGHT: 60 IN | TEMPERATURE: 98.1 F | OXYGEN SATURATION: 96 %

## 2020-01-01 VITALS
OXYGEN SATURATION: 92 % | TEMPERATURE: 98 F | SYSTOLIC BLOOD PRESSURE: 142 MMHG | DIASTOLIC BLOOD PRESSURE: 72 MMHG | RESPIRATION RATE: 22 BRPM | HEART RATE: 82 BPM

## 2020-01-01 VITALS — SYSTOLIC BLOOD PRESSURE: 134 MMHG | DIASTOLIC BLOOD PRESSURE: 71 MMHG | HEART RATE: 70 BPM

## 2020-01-01 DIAGNOSIS — R42 NONSPECIFIC DIZZINESS: ICD-10-CM

## 2020-01-01 DIAGNOSIS — R55 SYNCOPE, UNSPECIFIED SYNCOPE TYPE: ICD-10-CM

## 2020-01-01 DIAGNOSIS — Z01.818 PRE-OP TESTING: Primary | ICD-10-CM

## 2020-01-01 DIAGNOSIS — I95.9 HYPOTENSION, UNSPECIFIED HYPOTENSION TYPE: Primary | ICD-10-CM

## 2020-01-01 DIAGNOSIS — C44.321 SQUAMOUS CELL CANCER OF SKIN OF NOSE: Primary | ICD-10-CM

## 2020-01-01 DIAGNOSIS — Z72.0 TOBACCO ABUSE: ICD-10-CM

## 2020-01-01 DIAGNOSIS — R29.6 FREQUENT FALLS: Primary | ICD-10-CM

## 2020-01-01 DIAGNOSIS — J18.9 PNEUMONIA OF BOTH LUNGS DUE TO INFECTIOUS ORGANISM, UNSPECIFIED PART OF LUNG: ICD-10-CM

## 2020-01-01 DIAGNOSIS — J44.9 CHRONIC OBSTRUCTIVE PULMONARY DISEASE, UNSPECIFIED COPD TYPE (HCC): ICD-10-CM

## 2020-01-01 DIAGNOSIS — F17.200 CURRENT EVERY DAY SMOKER: ICD-10-CM

## 2020-01-01 DIAGNOSIS — D64.9 ANEMIA, UNSPECIFIED TYPE: Primary | ICD-10-CM

## 2020-01-01 DIAGNOSIS — R29.6 FREQUENT FALLS: ICD-10-CM

## 2020-01-01 DIAGNOSIS — Z01.818 OTHER SPECIFIED PRE-OPERATIVE EXAMINATION: Primary | ICD-10-CM

## 2020-01-01 DIAGNOSIS — R56.9 SEIZURES (HCC): ICD-10-CM

## 2020-01-01 DIAGNOSIS — I95.1 ORTHOSTASIS: ICD-10-CM

## 2020-01-01 DIAGNOSIS — L57.0 ACTINIC KERATOSIS: ICD-10-CM

## 2020-01-01 DIAGNOSIS — Z01.818 PREOP TESTING: Primary | ICD-10-CM

## 2020-01-01 DIAGNOSIS — C44.321 SQUAMOUS CELL CANCER OF SKIN OF NOSE: ICD-10-CM

## 2020-01-01 DIAGNOSIS — J44.9 CHRONIC OBSTRUCTIVE PULMONARY DISEASE, UNSPECIFIED COPD TYPE (HCC): Primary | ICD-10-CM

## 2020-01-01 DIAGNOSIS — R07.89 CHEST HEAVINESS: ICD-10-CM

## 2020-01-01 DIAGNOSIS — D17.22 LIPOMA OF LEFT UPPER EXTREMITY: ICD-10-CM

## 2020-01-01 DIAGNOSIS — M62.82 NON-TRAUMATIC RHABDOMYOLYSIS: ICD-10-CM

## 2020-01-01 DIAGNOSIS — C44.92 SCCA (SQUAMOUS CELL CARCINOMA) OF SKIN: Primary | ICD-10-CM

## 2020-01-01 DIAGNOSIS — A41.9 SEVERE SEPSIS (HCC): Primary | ICD-10-CM

## 2020-01-01 DIAGNOSIS — E87.1 HYPONATREMIA: ICD-10-CM

## 2020-01-01 DIAGNOSIS — D68.9 COAGULOPATHY (HCC): ICD-10-CM

## 2020-01-01 DIAGNOSIS — C44.92 SCCA (SQUAMOUS CELL CARCINOMA) OF SKIN: ICD-10-CM

## 2020-01-01 DIAGNOSIS — J96.01 ACUTE RESPIRATORY FAILURE WITH HYPOXIA (HCC): ICD-10-CM

## 2020-01-01 DIAGNOSIS — D48.5 NEOPLASM OF UNCERTAIN BEHAVIOR OF SKIN: Primary | ICD-10-CM

## 2020-01-01 DIAGNOSIS — R65.20 SEVERE SEPSIS (HCC): Primary | ICD-10-CM

## 2020-01-01 DIAGNOSIS — R55 SYNCOPE AND COLLAPSE: Primary | ICD-10-CM

## 2020-01-01 LAB
ALBUMIN SERPL-MCNC: 3.3 G/DL (ref 3.5–5.2)
ALBUMIN SERPL-MCNC: 3.4 G/DL (ref 3.5–5.2)
ALBUMIN SERPL-MCNC: 3.5 G/DL (ref 3.5–5.2)
ALBUMIN SERPL-MCNC: 3.6 G/DL (ref 3.5–5.2)
ALBUMIN SERPL-MCNC: 3.7 G/DL (ref 3.5–5.2)
ALBUMIN SERPL-MCNC: 3.9 G/DL (ref 3.5–5.2)
ALBUMIN SERPL-MCNC: 4.3 G/DL (ref 3.5–5.2)
ALBUMIN SERPL-MCNC: 4.3 G/DL (ref 3.5–5.2)
ALBUMIN SERPL-MCNC: 4.4 G/DL (ref 3.5–5.2)
ALBUMIN/GLOB SERPL: 0.9 G/DL
ALBUMIN/GLOB SERPL: 1.1 G/DL
ALBUMIN/GLOB SERPL: 1.2 G/DL
ALBUMIN/GLOB SERPL: 1.3 G/DL
ALBUMIN/GLOB SERPL: 1.3 G/DL
ALP SERPL-CCNC: 119 U/L (ref 39–117)
ALP SERPL-CCNC: 123 U/L (ref 39–117)
ALP SERPL-CCNC: 126 U/L (ref 39–117)
ALP SERPL-CCNC: 129 U/L (ref 39–117)
ALP SERPL-CCNC: 129 U/L (ref 39–117)
ALP SERPL-CCNC: 134 U/L (ref 39–117)
ALP SERPL-CCNC: 134 U/L (ref 39–117)
ALP SERPL-CCNC: 173 U/L (ref 39–117)
ALP SERPL-CCNC: 90 U/L (ref 39–117)
ALT SERPL W P-5'-P-CCNC: 10 U/L (ref 1–33)
ALT SERPL W P-5'-P-CCNC: 11 U/L (ref 1–33)
ALT SERPL W P-5'-P-CCNC: 44 U/L (ref 1–33)
ALT SERPL W P-5'-P-CCNC: 45 U/L (ref 1–33)
ALT SERPL W P-5'-P-CCNC: 46 U/L (ref 1–33)
ALT SERPL W P-5'-P-CCNC: 46 U/L (ref 1–33)
ALT SERPL W P-5'-P-CCNC: 48 U/L (ref 1–33)
ALT SERPL W P-5'-P-CCNC: 50 U/L (ref 1–33)
ALT SERPL W P-5'-P-CCNC: 52 U/L (ref 1–33)
AMMONIA BLD-SCNC: 20 UMOL/L (ref 11–51)
AMPHET+METHAMPHET UR QL: NEGATIVE
AMPHETAMINES UR QL: NEGATIVE
ANION GAP SERPL CALCULATED.3IONS-SCNC: 10 MMOL/L (ref 5–15)
ANION GAP SERPL CALCULATED.3IONS-SCNC: 11 MMOL/L (ref 5–15)
ANION GAP SERPL CALCULATED.3IONS-SCNC: 12 MMOL/L (ref 5–15)
ANION GAP SERPL CALCULATED.3IONS-SCNC: 13 MMOL/L (ref 5–15)
ANION GAP SERPL CALCULATED.3IONS-SCNC: 14 MMOL/L (ref 5–15)
ANION GAP SERPL CALCULATED.3IONS-SCNC: 17 MMOL/L (ref 5–15)
ANISOCYTOSIS BLD QL: ABNORMAL
ANISOCYTOSIS BLD QL: ABNORMAL
APAP SERPL-MCNC: <5 MCG/ML (ref 0–30)
APTT PPP: 45 SECONDS (ref 24.1–35)
APTT PPP: 48.4 SECONDS (ref 24.1–35)
ARTERIAL PATENCY WRIST A: ABNORMAL
ARTERIAL PATENCY WRIST A: POSITIVE
AST SERPL-CCNC: 130 U/L (ref 1–32)
AST SERPL-CCNC: 137 U/L (ref 1–32)
AST SERPL-CCNC: 158 U/L (ref 1–32)
AST SERPL-CCNC: 163 U/L (ref 1–32)
AST SERPL-CCNC: 169 U/L (ref 1–32)
AST SERPL-CCNC: 28 U/L (ref 1–32)
AST SERPL-CCNC: 29 U/L (ref 1–32)
AST SERPL-CCNC: 96 U/L (ref 1–32)
AST SERPL-CCNC: 99 U/L (ref 1–32)
AT III PPP CHRO-ACNC: 41 % (ref 84–123)
ATMOSPHERIC PRESS: 741 MMHG
ATMOSPHERIC PRESS: 751 MMHG
ATMOSPHERIC PRESS: 751 MMHG
ATMOSPHERIC PRESS: 753 MMHG
ATMOSPHERIC PRESS: 754 MMHG
ATMOSPHERIC PRESS: 755 MMHG
ATMOSPHERIC PRESS: 756 MMHG
ATMOSPHERIC PRESS: 760 MMHG
ATMOSPHERIC PRESS: 763 MMHG
ATMOSPHERIC PRESS: 764 MMHG
B PARAPERT DNA SPEC QL NAA+PROBE: NOT DETECTED
B PERT DNA SPEC QL NAA+PROBE: NOT DETECTED
BACTERIA SPEC AEROBE CULT: NORMAL
BACTERIA SPEC AEROBE CULT: NORMAL
BACTERIA SPEC RESP CULT: NORMAL
BACTERIA UR QL AUTO: ABNORMAL /HPF
BARBITURATES UR QL SCN: NEGATIVE
BASE EXCESS BLDA CALC-SCNC: -0.5 MMOL/L (ref 0–2)
BASE EXCESS BLDA CALC-SCNC: -0.6 MMOL/L (ref 0–2)
BASE EXCESS BLDA CALC-SCNC: -11.4 MMOL/L (ref 0–2)
BASE EXCESS BLDA CALC-SCNC: -13.6 MMOL/L (ref 0–2)
BASE EXCESS BLDA CALC-SCNC: -16 MMOL/L (ref 0–2)
BASE EXCESS BLDA CALC-SCNC: -2 MMOL/L (ref 0–2)
BASE EXCESS BLDA CALC-SCNC: -2.3 MMOL/L (ref 0–2)
BASE EXCESS BLDA CALC-SCNC: -2.9 MMOL/L (ref 0–2)
BASE EXCESS BLDA CALC-SCNC: -3 MMOL/L (ref 0–2)
BASE EXCESS BLDA CALC-SCNC: 0.8 MMOL/L (ref 0–2)
BASOPHILS # BLD AUTO: 0.01 10*3/MM3 (ref 0–0.2)
BASOPHILS # BLD AUTO: 0.02 10*3/MM3 (ref 0–0.2)
BASOPHILS # BLD AUTO: 0.03 10*3/MM3 (ref 0–0.2)
BASOPHILS # BLD AUTO: 0.04 10*3/MM3 (ref 0–0.2)
BASOPHILS NFR BLD AUTO: 0.1 % (ref 0–1.5)
BASOPHILS NFR BLD AUTO: 0.1 % (ref 0–1.5)
BASOPHILS NFR BLD AUTO: 0.5 % (ref 0–1.5)
BASOPHILS NFR BLD AUTO: 0.6 % (ref 0–1.5)
BDY SITE: ABNORMAL
BENZODIAZ UR QL SCN: POSITIVE
BH CV ECHO MEAS - AO MAX PG (FULL): 3.9 MMHG
BH CV ECHO MEAS - AO MAX PG (FULL): 4.4 MMHG
BH CV ECHO MEAS - AO MAX PG: 8.1 MMHG
BH CV ECHO MEAS - AO MAX PG: 9.4 MMHG
BH CV ECHO MEAS - AO MEAN PG (FULL): 2 MMHG
BH CV ECHO MEAS - AO MEAN PG (FULL): 3 MMHG
BH CV ECHO MEAS - AO MEAN PG: 4 MMHG
BH CV ECHO MEAS - AO MEAN PG: 5 MMHG
BH CV ECHO MEAS - AO ROOT AREA (BSA CORRECTED): 1.5
BH CV ECHO MEAS - AO ROOT AREA (BSA CORRECTED): 1.6
BH CV ECHO MEAS - AO ROOT AREA: 4.5 CM^2
BH CV ECHO MEAS - AO ROOT AREA: 5.3 CM^2
BH CV ECHO MEAS - AO ROOT DIAM: 2.4 CM
BH CV ECHO MEAS - AO ROOT DIAM: 2.6 CM
BH CV ECHO MEAS - AO V2 MAX: 142 CM/SEC
BH CV ECHO MEAS - AO V2 MAX: 153 CM/SEC
BH CV ECHO MEAS - AO V2 MEAN: 90.3 CM/SEC
BH CV ECHO MEAS - AO V2 MEAN: 96.5 CM/SEC
BH CV ECHO MEAS - AO V2 VTI: 25.3 CM
BH CV ECHO MEAS - AO V2 VTI: 26.4 CM
BH CV ECHO MEAS - AVA(I,A): 2.1 CM^2
BH CV ECHO MEAS - AVA(I,A): 2.1 CM^2
BH CV ECHO MEAS - AVA(I,D): 2.1 CM^2
BH CV ECHO MEAS - AVA(I,D): 2.1 CM^2
BH CV ECHO MEAS - AVA(V,A): 2 CM^2
BH CV ECHO MEAS - AVA(V,A): 2.1 CM^2
BH CV ECHO MEAS - AVA(V,D): 2 CM^2
BH CV ECHO MEAS - AVA(V,D): 2.1 CM^2
BH CV ECHO MEAS - BSA(HAYCOCK): 1.6 M^2
BH CV ECHO MEAS - BSA(HAYCOCK): 1.7 M^2
BH CV ECHO MEAS - BSA: 1.6 M^2
BH CV ECHO MEAS - BSA: 1.6 M^2
BH CV ECHO MEAS - BZI_BMI: 26.2 KILOGRAMS/M^2
BH CV ECHO MEAS - BZI_BMI: 27.9 KILOGRAMS/M^2
BH CV ECHO MEAS - BZI_METRIC_HEIGHT: 152.4 CM
BH CV ECHO MEAS - BZI_METRIC_HEIGHT: 152.4 CM
BH CV ECHO MEAS - BZI_METRIC_WEIGHT: 60.8 KG
BH CV ECHO MEAS - BZI_METRIC_WEIGHT: 64.9 KG
BH CV ECHO MEAS - EDV(CUBED): 38.6 ML
BH CV ECHO MEAS - EDV(CUBED): 48.6 ML
BH CV ECHO MEAS - EDV(MOD-SP4): 37.4 ML
BH CV ECHO MEAS - EDV(MOD-SP4): 39.4 ML
BH CV ECHO MEAS - EDV(TEICH): 46.8 ML
BH CV ECHO MEAS - EDV(TEICH): 56.3 ML
BH CV ECHO MEAS - EF(CUBED): 60.4 %
BH CV ECHO MEAS - EF(CUBED): 71.7 %
BH CV ECHO MEAS - EF(MOD-BP): 55 %
BH CV ECHO MEAS - EF(MOD-SP4): 52.3 %
BH CV ECHO MEAS - EF(MOD-SP4): 54 %
BH CV ECHO MEAS - EF(TEICH): 52.9 %
BH CV ECHO MEAS - EF(TEICH): 64.6 %
BH CV ECHO MEAS - ESV(CUBED): 10.9 ML
BH CV ECHO MEAS - ESV(CUBED): 19.2 ML
BH CV ECHO MEAS - ESV(MOD-SP4): 17.2 ML
BH CV ECHO MEAS - ESV(MOD-SP4): 18.8 ML
BH CV ECHO MEAS - ESV(TEICH): 16.6 ML
BH CV ECHO MEAS - ESV(TEICH): 26.5 ML
BH CV ECHO MEAS - FS: 26.6 %
BH CV ECHO MEAS - FS: 34.3 %
BH CV ECHO MEAS - IVS/LVPW: 0.92
BH CV ECHO MEAS - IVS/LVPW: 0.93
BH CV ECHO MEAS - IVSD: 0.88 CM
BH CV ECHO MEAS - IVSD: 1.1 CM
BH CV ECHO MEAS - LA DIMENSION: 2.7 CM
BH CV ECHO MEAS - LA DIMENSION: 3.1 CM
BH CV ECHO MEAS - LA/AO: 1
BH CV ECHO MEAS - LA/AO: 1.3
BH CV ECHO MEAS - LAT PEAK E' VEL: 15.3 CM/SEC
BH CV ECHO MEAS - LAT PEAK E' VEL: 7.3 CM/SEC
BH CV ECHO MEAS - LV DIASTOLIC VOL/BSA (35-75): 23.1 ML/M^2
BH CV ECHO MEAS - LV DIASTOLIC VOL/BSA (35-75): 25 ML/M^2
BH CV ECHO MEAS - LV MASS(C)D: 112.3 GRAMS
BH CV ECHO MEAS - LV MASS(C)D: 97.1 GRAMS
BH CV ECHO MEAS - LV MASS(C)DI: 60 GRAMS/M^2
BH CV ECHO MEAS - LV MASS(C)DI: 71.3 GRAMS/M^2
BH CV ECHO MEAS - LV MAX PG: 4.2 MMHG
BH CV ECHO MEAS - LV MAX PG: 4.9 MMHG
BH CV ECHO MEAS - LV MEAN PG: 2 MMHG
BH CV ECHO MEAS - LV MEAN PG: 2 MMHG
BH CV ECHO MEAS - LV SYSTOLIC VOL/BSA (12-30): 10.6 ML/M^2
BH CV ECHO MEAS - LV SYSTOLIC VOL/BSA (12-30): 11.9 ML/M^2
BH CV ECHO MEAS - LV V1 MAX: 102 CM/SEC
BH CV ECHO MEAS - LV V1 MAX: 111 CM/SEC
BH CV ECHO MEAS - LV V1 MEAN: 60.5 CM/SEC
BH CV ECHO MEAS - LV V1 MEAN: 66.5 CM/SEC
BH CV ECHO MEAS - LV V1 VTI: 18.6 CM
BH CV ECHO MEAS - LV V1 VTI: 20 CM
BH CV ECHO MEAS - LVIDD: 3.4 CM
BH CV ECHO MEAS - LVIDD: 3.7 CM
BH CV ECHO MEAS - LVIDS: 2.2 CM
BH CV ECHO MEAS - LVIDS: 2.7 CM
BH CV ECHO MEAS - LVLD AP4: 6.2 CM
BH CV ECHO MEAS - LVLD AP4: 6.6 CM
BH CV ECHO MEAS - LVLS AP4: 4.9 CM
BH CV ECHO MEAS - LVLS AP4: 5.8 CM
BH CV ECHO MEAS - LVOT AREA (M): 2.8 CM^2
BH CV ECHO MEAS - LVOT AREA (M): 2.8 CM^2
BH CV ECHO MEAS - LVOT AREA: 2.8 CM^2
BH CV ECHO MEAS - LVOT AREA: 2.8 CM^2
BH CV ECHO MEAS - LVOT DIAM: 1.9 CM
BH CV ECHO MEAS - LVOT DIAM: 1.9 CM
BH CV ECHO MEAS - LVPWD: 0.95 CM
BH CV ECHO MEAS - LVPWD: 1.1 CM
BH CV ECHO MEAS - MED PEAK E' VEL: 6.9 CM/SEC
BH CV ECHO MEAS - MED PEAK E' VEL: 7.51 CM/SEC
BH CV ECHO MEAS - MV A MAX VEL: 70.3 CM/SEC
BH CV ECHO MEAS - MV A MAX VEL: 72.8 CM/SEC
BH CV ECHO MEAS - MV DEC SLOPE: 855 CM/SEC^2
BH CV ECHO MEAS - MV DEC TIME: 0.09 SEC
BH CV ECHO MEAS - MV DEC TIME: 0.17 SEC
BH CV ECHO MEAS - MV E MAX VEL: 64 CM/SEC
BH CV ECHO MEAS - MV E MAX VEL: 80 CM/SEC
BH CV ECHO MEAS - MV E/A: 0.88
BH CV ECHO MEAS - MV E/A: 1.1
BH CV ECHO MEAS - MV P1/2T MAX VEL: 86.9 CM/SEC
BH CV ECHO MEAS - MV P1/2T: 29.8 MSEC
BH CV ECHO MEAS - MVA P1/2T LCG: 2.5 CM^2
BH CV ECHO MEAS - MVA(P1/2T): 7.4 CM^2
BH CV ECHO MEAS - PA MAX PG: 3.1 MMHG
BH CV ECHO MEAS - PA V2 MAX: 88.2 CM/SEC
BH CV ECHO MEAS - PI END-D VEL: 152 CM/SEC
BH CV ECHO MEAS - RAP SYSTOLE: 10 MMHG
BH CV ECHO MEAS - RAP SYSTOLE: 5 MMHG
BH CV ECHO MEAS - RVDD: 4.1 CM
BH CV ECHO MEAS - RVSP: 57 MMHG
BH CV ECHO MEAS - RVSP: 59.2 MMHG
BH CV ECHO MEAS - SI(AO): 75.9 ML/M^2
BH CV ECHO MEAS - SI(AO): 83 ML/M^2
BH CV ECHO MEAS - SI(CUBED): 17.6 ML/M^2
BH CV ECHO MEAS - SI(CUBED): 18.2 ML/M^2
BH CV ECHO MEAS - SI(LVOT): 32.6 ML/M^2
BH CV ECHO MEAS - SI(LVOT): 36 ML/M^2
BH CV ECHO MEAS - SI(MOD-SP4): 12.5 ML/M^2
BH CV ECHO MEAS - SI(MOD-SP4): 13.1 ML/M^2
BH CV ECHO MEAS - SI(TEICH): 18.4 ML/M^2
BH CV ECHO MEAS - SI(TEICH): 19.2 ML/M^2
BH CV ECHO MEAS - SV(AO): 119.4 ML
BH CV ECHO MEAS - SV(AO): 134.3 ML
BH CV ECHO MEAS - SV(CUBED): 27.7 ML
BH CV ECHO MEAS - SV(CUBED): 29.4 ML
BH CV ECHO MEAS - SV(LVOT): 52.7 ML
BH CV ECHO MEAS - SV(LVOT): 56.7 ML
BH CV ECHO MEAS - SV(MOD-SP4): 20.2 ML
BH CV ECHO MEAS - SV(MOD-SP4): 20.6 ML
BH CV ECHO MEAS - SV(TEICH): 29.7 ML
BH CV ECHO MEAS - SV(TEICH): 30.2 ML
BH CV ECHO MEAS - TR MAX VEL: 342 CM/SEC
BH CV ECHO MEAS - TR MAX VEL: 368 CM/SEC
BH CV ECHO MEASUREMENTS AVERAGE E/E' RATIO: 7.21
BH CV ECHO MEASUREMENTS AVERAGE E/E' RATIO: 8.64
BH CV STRESS BP STAGE 1: NORMAL
BH CV STRESS COMMENTS STAGE 1: NORMAL
BH CV STRESS DOSE REGADENOSON STAGE 1: 0.4
BH CV STRESS DURATION MIN STAGE 1: 0
BH CV STRESS DURATION SEC STAGE 1: 10
BH CV STRESS HR STAGE 1: 82
BH CV STRESS PROTOCOL 1: NORMAL
BH CV STRESS RECOVERY BP: NORMAL MMHG
BH CV STRESS RECOVERY HR: 74 BPM
BH CV STRESS STAGE 1: 1
BH CV XLRA - RV MID: 3.6 CM
BILIRUB SERPL-MCNC: 0.6 MG/DL (ref 0–1.2)
BILIRUB SERPL-MCNC: 0.6 MG/DL (ref 0–1.2)
BILIRUB SERPL-MCNC: 2.3 MG/DL (ref 0–1.2)
BILIRUB SERPL-MCNC: 2.4 MG/DL (ref 0–1.2)
BILIRUB SERPL-MCNC: 2.5 MG/DL (ref 0–1.2)
BILIRUB SERPL-MCNC: 2.5 MG/DL (ref 0–1.2)
BILIRUB SERPL-MCNC: 3.8 MG/DL (ref 0–1.2)
BILIRUB SERPL-MCNC: 3.9 MG/DL (ref 0–1.2)
BILIRUB SERPL-MCNC: 4 MG/DL (ref 0–1.2)
BILIRUB UR QL STRIP: NEGATIVE
BODY TEMPERATURE: 37 C
BUN SERPL-MCNC: 13 MG/DL (ref 8–23)
BUN SERPL-MCNC: 16 MG/DL (ref 8–23)
BUN SERPL-MCNC: 16 MG/DL (ref 8–23)
BUN SERPL-MCNC: 17 MG/DL (ref 8–23)
BUN SERPL-MCNC: 21 MG/DL (ref 8–23)
BUN SERPL-MCNC: 22 MG/DL (ref 8–23)
BUN SERPL-MCNC: 24 MG/DL (ref 8–23)
BUN SERPL-MCNC: 29 MG/DL (ref 8–23)
BUN SERPL-MCNC: 35 MG/DL (ref 8–23)
BUN SERPL-MCNC: 43 MG/DL (ref 8–23)
BUN/CREAT SERPL: 20.6 (ref 7–25)
BUN/CREAT SERPL: 28.6 (ref 7–25)
BUN/CREAT SERPL: 29.6 (ref 7–25)
BUN/CREAT SERPL: 32.1 (ref 7–25)
BUN/CREAT SERPL: 35 (ref 7–25)
BUN/CREAT SERPL: 35.6 (ref 7–25)
BUN/CREAT SERPL: 42.1 (ref 7–25)
BUN/CREAT SERPL: 50 (ref 7–25)
BUN/CREAT SERPL: 52.4 (ref 7–25)
BUN/CREAT SERPL: 58 (ref 7–25)
BUPRENORPHINE SERPL-MCNC: NEGATIVE NG/ML
C PNEUM DNA NPH QL NAA+NON-PROBE: NOT DETECTED
CALCIUM SPEC-SCNC: 10.5 MG/DL (ref 8.6–10.5)
CALCIUM SPEC-SCNC: 8.8 MG/DL (ref 8.6–10.5)
CALCIUM SPEC-SCNC: 9 MG/DL (ref 8.6–10.5)
CALCIUM SPEC-SCNC: 9.1 MG/DL (ref 8.6–10.5)
CALCIUM SPEC-SCNC: 9.1 MG/DL (ref 8.6–10.5)
CALCIUM SPEC-SCNC: 9.2 MG/DL (ref 8.6–10.5)
CALCIUM SPEC-SCNC: 9.2 MG/DL (ref 8.6–10.5)
CALCIUM SPEC-SCNC: 9.3 MG/DL (ref 8.6–10.5)
CALCIUM SPEC-SCNC: 9.6 MG/DL (ref 8.6–10.5)
CALCIUM SPEC-SCNC: 9.9 MG/DL (ref 8.6–10.5)
CANNABINOIDS SERPL QL: NEGATIVE
CHLORIDE SERPL-SCNC: 100 MMOL/L (ref 98–107)
CHLORIDE SERPL-SCNC: 109 MMOL/L (ref 98–107)
CHLORIDE SERPL-SCNC: 110 MMOL/L (ref 98–107)
CHLORIDE SERPL-SCNC: 112 MMOL/L (ref 98–107)
CHLORIDE SERPL-SCNC: 115 MMOL/L (ref 98–107)
CHLORIDE SERPL-SCNC: 119 MMOL/L (ref 98–107)
CHLORIDE SERPL-SCNC: 121 MMOL/L (ref 98–107)
CHLORIDE SERPL-SCNC: 84 MMOL/L (ref 98–107)
CHLORIDE SERPL-SCNC: 87 MMOL/L (ref 98–107)
CHLORIDE SERPL-SCNC: 89 MMOL/L (ref 98–107)
CHOLEST SERPL-MCNC: 69 MG/DL (ref 0–200)
CHROMATIN AB SERPL-ACNC: 10.8 IU/ML (ref 0–14)
CK SERPL-CCNC: 1525 U/L (ref 20–180)
CK SERPL-CCNC: 660 U/L (ref 20–180)
CK SERPL-CCNC: 736 U/L (ref 20–180)
CK SERPL-CCNC: 996 U/L (ref 20–180)
CLARITY UR: CLEAR
CO2 SERPL-SCNC: 17 MMOL/L (ref 22–29)
CO2 SERPL-SCNC: 24 MMOL/L (ref 22–29)
CO2 SERPL-SCNC: 25 MMOL/L (ref 22–29)
CO2 SERPL-SCNC: 26 MMOL/L (ref 22–29)
CO2 SERPL-SCNC: 26 MMOL/L (ref 22–29)
CO2 SERPL-SCNC: 28 MMOL/L (ref 22–29)
COCAINE UR QL: NEGATIVE
COLOR UR: YELLOW
COVID LABCORP PRIORITY: NORMAL
CREAT BLDA-MCNC: 0.8 MG/DL (ref 0.6–1.3)
CREAT SERPL-MCNC: 0.42 MG/DL (ref 0.57–1)
CREAT SERPL-MCNC: 0.5 MG/DL (ref 0.57–1)
CREAT SERPL-MCNC: 0.53 MG/DL (ref 0.57–1)
CREAT SERPL-MCNC: 0.54 MG/DL (ref 0.57–1)
CREAT SERPL-MCNC: 0.56 MG/DL (ref 0.57–1)
CREAT SERPL-MCNC: 0.57 MG/DL (ref 0.57–1)
CREAT SERPL-MCNC: 0.59 MG/DL (ref 0.57–1)
CREAT SERPL-MCNC: 0.63 MG/DL (ref 0.57–1)
CREAT SERPL-MCNC: 0.86 MG/DL (ref 0.57–1)
CREAT SERPL-MCNC: 1 MG/DL (ref 0.57–1)
CRP SERPL-MCNC: 3.52 MG/DL (ref 0–0.5)
CYTO UR: NORMAL
CYTOLOGIST CVX/VAG CYTO: NORMAL
CYTOLOGIST CVX/VAG CYTO: NORMAL
D DIMER PPP FEU-MCNC: 3.12 MG/L (FEU) (ref 0–0.5)
D-LACTATE SERPL-SCNC: 11.3 MMOL/L (ref 0.5–2)
D-LACTATE SERPL-SCNC: 12.7 MMOL/L (ref 0.5–2)
D-LACTATE SERPL-SCNC: 2.1 MMOL/L (ref 0.5–2)
D-LACTATE SERPL-SCNC: 2.7 MMOL/L (ref 0.5–2)
D-LACTATE SERPL-SCNC: 2.9 MMOL/L (ref 0.5–2)
D-LACTATE SERPL-SCNC: 5.9 MMOL/L (ref 0.5–2)
DAT POLY-SP REAG RBC QL: NEGATIVE
DEPRECATED RDW RBC AUTO: 48 FL (ref 37–54)
DEPRECATED RDW RBC AUTO: 49.4 FL (ref 37–54)
DEPRECATED RDW RBC AUTO: 50.2 FL (ref 37–54)
DEPRECATED RDW RBC AUTO: 56.1 FL (ref 37–54)
DEPRECATED RDW RBC AUTO: 56.8 FL (ref 37–54)
DEPRECATED RDW RBC AUTO: 57.4 FL (ref 37–54)
DEPRECATED RDW RBC AUTO: 58.7 FL (ref 37–54)
DEPRECATED RDW RBC AUTO: 59.2 FL (ref 37–54)
DEPRECATED RDW RBC AUTO: 60.4 FL (ref 37–54)
DEPRECATED RDW RBC AUTO: 65.9 FL (ref 37–54)
EOSINOPHIL # BLD AUTO: 0 10*3/MM3 (ref 0–0.4)
EOSINOPHIL # BLD AUTO: 0.01 10*3/MM3 (ref 0–0.4)
EOSINOPHIL # BLD AUTO: 0.04 10*3/MM3 (ref 0–0.4)
EOSINOPHIL # BLD AUTO: 0.06 10*3/MM3 (ref 0–0.4)
EOSINOPHIL NFR BLD AUTO: 0 % (ref 0.3–6.2)
EOSINOPHIL NFR BLD AUTO: 0.1 % (ref 0.3–6.2)
EOSINOPHIL NFR BLD AUTO: 0.8 % (ref 0.3–6.2)
EOSINOPHIL NFR BLD AUTO: 0.9 % (ref 0.3–6.2)
ERYTHROCYTE [DISTWIDTH] IN BLOOD BY AUTOMATED COUNT: 15.9 % (ref 12.3–15.4)
ERYTHROCYTE [DISTWIDTH] IN BLOOD BY AUTOMATED COUNT: 16 % (ref 12.3–15.4)
ERYTHROCYTE [DISTWIDTH] IN BLOOD BY AUTOMATED COUNT: 16.1 % (ref 12.3–15.4)
ERYTHROCYTE [DISTWIDTH] IN BLOOD BY AUTOMATED COUNT: 20.7 % (ref 12.3–15.4)
ERYTHROCYTE [DISTWIDTH] IN BLOOD BY AUTOMATED COUNT: 21.4 % (ref 12.3–15.4)
ERYTHROCYTE [DISTWIDTH] IN BLOOD BY AUTOMATED COUNT: 21.5 % (ref 12.3–15.4)
ERYTHROCYTE [DISTWIDTH] IN BLOOD BY AUTOMATED COUNT: 22 % (ref 12.3–15.4)
ERYTHROCYTE [DISTWIDTH] IN BLOOD BY AUTOMATED COUNT: 22.3 % (ref 12.3–15.4)
ERYTHROCYTE [DISTWIDTH] IN BLOOD BY AUTOMATED COUNT: 22.8 % (ref 12.3–15.4)
ERYTHROCYTE [DISTWIDTH] IN BLOOD BY AUTOMATED COUNT: 24.3 % (ref 12.3–15.4)
ETHANOL UR QL: <0.01 %
FERRITIN SERPL-MCNC: 216.3 NG/ML (ref 13–150)
FIBRINOGEN PPP-MCNC: 305 MG/DL (ref 240–460)
FIBRINOGEN PPP-MCNC: 422 MG/DL (ref 240–460)
FLUAV AG NPH QL: NEGATIVE
FLUAV H1 2009 PAND RNA NPH QL NAA+PROBE: NOT DETECTED
FLUAV H1 HA GENE NPH QL NAA+PROBE: NOT DETECTED
FLUAV H3 RNA NPH QL NAA+PROBE: NOT DETECTED
FLUAV SUBTYP SPEC NAA+PROBE: NOT DETECTED
FLUBV AG NPH QL IA: NEGATIVE
FLUBV RNA ISLT QL NAA+PROBE: NOT DETECTED
FOLATE SERPL-MCNC: <2 NG/ML (ref 4.78–24.2)
FSP PPP LA-ACNC: ABNORMAL
FSP PPP LA-ACNC: ABNORMAL
GFR SERPL CREATININE-BSD FRML MDRD: 103 ML/MIN/1.73
GFR SERPL CREATININE-BSD FRML MDRD: 107 ML/MIN/1.73
GFR SERPL CREATININE-BSD FRML MDRD: 110 ML/MIN/1.73
GFR SERPL CREATININE-BSD FRML MDRD: 114 ML/MIN/1.73
GFR SERPL CREATININE-BSD FRML MDRD: 117 ML/MIN/1.73
GFR SERPL CREATININE-BSD FRML MDRD: 125 ML/MIN/1.73
GFR SERPL CREATININE-BSD FRML MDRD: 56 ML/MIN/1.73
GFR SERPL CREATININE-BSD FRML MDRD: 67 ML/MIN/1.73
GFR SERPL CREATININE-BSD FRML MDRD: 96 ML/MIN/1.73
GFR SERPL CREATININE-BSD FRML MDRD: >150 ML/MIN/1.73
GLOBULIN UR ELPH-MCNC: 2.9 GM/DL
GLOBULIN UR ELPH-MCNC: 3 GM/DL
GLOBULIN UR ELPH-MCNC: 3 GM/DL
GLOBULIN UR ELPH-MCNC: 3.1 GM/DL
GLOBULIN UR ELPH-MCNC: 3.1 GM/DL
GLOBULIN UR ELPH-MCNC: 3.2 GM/DL
GLOBULIN UR ELPH-MCNC: 3.6 GM/DL
GLOBULIN UR ELPH-MCNC: 3.7 GM/DL
GLOBULIN UR ELPH-MCNC: 3.8 GM/DL
GLUCOSE BLDC GLUCOMTR-MCNC: 123 MG/DL (ref 70–130)
GLUCOSE SERPL-MCNC: 110 MG/DL (ref 65–99)
GLUCOSE SERPL-MCNC: 112 MG/DL (ref 65–99)
GLUCOSE SERPL-MCNC: 117 MG/DL (ref 65–99)
GLUCOSE SERPL-MCNC: 120 MG/DL (ref 65–99)
GLUCOSE SERPL-MCNC: 124 MG/DL (ref 65–99)
GLUCOSE SERPL-MCNC: 135 MG/DL (ref 65–99)
GLUCOSE SERPL-MCNC: 141 MG/DL (ref 65–99)
GLUCOSE SERPL-MCNC: 179 MG/DL (ref 65–99)
GLUCOSE SERPL-MCNC: 89 MG/DL (ref 65–99)
GLUCOSE SERPL-MCNC: 97 MG/DL (ref 65–99)
GLUCOSE UR STRIP-MCNC: NEGATIVE MG/DL
GRAM STN SPEC: NORMAL
GRAM STN SPEC: NORMAL
HADV DNA SPEC NAA+PROBE: NOT DETECTED
HAPTOGLOB SERPL-MCNC: 66 MG/DL (ref 30–200)
HAPTOGLOB SERPL-MCNC: 70 MG/DL (ref 30–200)
HAV IGM SERPL QL IA: NORMAL
HBV CORE IGM SERPL QL IA: NORMAL
HBV SURFACE AG SERPL QL IA: NORMAL
HCO3 BLDA-SCNC: 12.6 MMOL/L (ref 20–26)
HCO3 BLDA-SCNC: 14.9 MMOL/L (ref 20–26)
HCO3 BLDA-SCNC: 16 MMOL/L (ref 20–26)
HCO3 BLDA-SCNC: 22.7 MMOL/L (ref 20–26)
HCO3 BLDA-SCNC: 23.4 MMOL/L (ref 20–26)
HCO3 BLDA-SCNC: 23.4 MMOL/L (ref 20–26)
HCO3 BLDA-SCNC: 23.6 MMOL/L (ref 20–26)
HCO3 BLDA-SCNC: 24 MMOL/L (ref 20–26)
HCO3 BLDA-SCNC: 24.1 MMOL/L (ref 20–26)
HCO3 BLDA-SCNC: 25.2 MMOL/L (ref 20–26)
HCOV 229E RNA SPEC QL NAA+PROBE: NOT DETECTED
HCOV HKU1 RNA SPEC QL NAA+PROBE: NOT DETECTED
HCOV NL63 RNA SPEC QL NAA+PROBE: NOT DETECTED
HCOV OC43 RNA SPEC QL NAA+PROBE: NOT DETECTED
HCT VFR BLD AUTO: 24.2 % (ref 34–46.6)
HCT VFR BLD AUTO: 24.6 % (ref 34–46.6)
HCT VFR BLD AUTO: 25.1 % (ref 34–46.6)
HCT VFR BLD AUTO: 27.8 % (ref 34–46.6)
HCT VFR BLD AUTO: 28.1 % (ref 34–46.6)
HCT VFR BLD AUTO: 29.8 % (ref 34–46.6)
HCT VFR BLD AUTO: 31 % (ref 34–46.6)
HCT VFR BLD AUTO: 32.4 % (ref 34–46.6)
HCT VFR BLD AUTO: 33.4 % (ref 34–46.6)
HCT VFR BLD AUTO: 34.5 % (ref 34–46.6)
HCV AB SER DONR QL: NORMAL
HDLC SERPL-MCNC: 21 MG/DL (ref 40–60)
HGB BLD-MCNC: 10.3 G/DL (ref 12–15.9)
HGB BLD-MCNC: 10.5 G/DL (ref 12–15.9)
HGB BLD-MCNC: 7.6 G/DL (ref 12–15.9)
HGB BLD-MCNC: 7.7 G/DL (ref 12–15.9)
HGB BLD-MCNC: 7.8 G/DL (ref 12–15.9)
HGB BLD-MCNC: 8.7 G/DL (ref 12–15.9)
HGB BLD-MCNC: 8.8 G/DL (ref 12–15.9)
HGB BLD-MCNC: 9 G/DL (ref 12–15.9)
HGB BLD-MCNC: 9.6 G/DL (ref 12–15.9)
HGB BLD-MCNC: 9.6 G/DL (ref 12–15.9)
HGB UR QL STRIP.AUTO: ABNORMAL
HIV1+2 AB SER QL: NORMAL
HMPV RNA NPH QL NAA+NON-PROBE: NOT DETECTED
HOLD SPECIMEN: NORMAL
HPIV1 RNA SPEC QL NAA+PROBE: NOT DETECTED
HPIV2 RNA SPEC QL NAA+PROBE: NOT DETECTED
HPIV3 RNA NPH QL NAA+PROBE: NOT DETECTED
HPIV4 P GENE NPH QL NAA+PROBE: NOT DETECTED
HYALINE CASTS UR QL AUTO: ABNORMAL /LPF
HYPOCHROMIA BLD QL: ABNORMAL
HYPOCHROMIA BLD QL: ABNORMAL
IMM GRANULOCYTES # BLD AUTO: 0.03 10*3/MM3 (ref 0–0.05)
IMM GRANULOCYTES # BLD AUTO: 0.03 10*3/MM3 (ref 0–0.05)
IMM GRANULOCYTES # BLD AUTO: 0.18 10*3/MM3 (ref 0–0.05)
IMM GRANULOCYTES NFR BLD AUTO: 0.4 % (ref 0–0.5)
IMM GRANULOCYTES NFR BLD AUTO: 0.6 % (ref 0–0.5)
IMM GRANULOCYTES NFR BLD AUTO: 1.3 % (ref 0–0.5)
INHALED O2 CONCENTRATION: 100 %
INHALED O2 CONCENTRATION: 100 %
INHALED O2 CONCENTRATION: 21 %
INHALED O2 CONCENTRATION: 30 %
INHALED O2 CONCENTRATION: 40 %
INHALED O2 CONCENTRATION: 60 %
INHALED O2 CONCENTRATION: 60 %
INHALED O2 CONCENTRATION: 80 %
INR PPP: 2.11 (ref 0.91–1.09)
INR PPP: 2.78 (ref 0.91–1.09)
IRON 24H UR-MRATE: 44 MCG/DL (ref 37–145)
IRON SATN MFR SERPL: 8 % (ref 20–50)
KETONES UR QL STRIP: ABNORMAL
L PNEUMO1 AG UR QL IA: NEGATIVE
LAB AP CASE REPORT: NORMAL
LAB AP CLINICAL INFORMATION: NORMAL
LAB AP CLINICAL INFORMATION: NORMAL
LACTATE HOLD SPECIMEN: NORMAL
LDH SERPL-CCNC: 800 U/L (ref 135–214)
LDLC SERPL CALC-MCNC: 31 MG/DL (ref 0–100)
LDLC/HDLC SERPL: 1.52 {RATIO}
LEFT ATRIUM VOLUME INDEX: 32 ML/M2
LEUKOCYTE ESTERASE UR QL STRIP.AUTO: NEGATIVE
LIPASE SERPL-CCNC: 156 U/L (ref 13–60)
LV EF 2D ECHO EST: 55 %
LV EF 2D ECHO EST: 55 %
LV EF NUC BP: 65 %
LYMPHOCYTES # BLD AUTO: 0.35 10*3/MM3 (ref 0.7–3.1)
LYMPHOCYTES # BLD AUTO: 0.78 10*3/MM3 (ref 0.7–3.1)
LYMPHOCYTES # BLD AUTO: 1.05 10*3/MM3 (ref 0.7–3.1)
LYMPHOCYTES # BLD AUTO: 1.73 10*3/MM3 (ref 0.7–3.1)
LYMPHOCYTES # BLD MANUAL: 0.08 10*3/MM3 (ref 0.7–3.1)
LYMPHOCYTES # BLD MANUAL: 0.1 10*3/MM3 (ref 0.7–3.1)
LYMPHOCYTES NFR BLD AUTO: 22.7 % (ref 19.6–45.3)
LYMPHOCYTES NFR BLD AUTO: 23.3 % (ref 19.6–45.3)
LYMPHOCYTES NFR BLD AUTO: 4.3 % (ref 19.6–45.3)
LYMPHOCYTES NFR BLD AUTO: 5.5 % (ref 19.6–45.3)
LYMPHOCYTES NFR BLD MANUAL: 1 % (ref 19.6–45.3)
LYMPHOCYTES NFR BLD MANUAL: 1 % (ref 19.6–45.3)
LYMPHOCYTES NFR BLD MANUAL: 5 % (ref 5–12)
LYMPHOCYTES NFR BLD MANUAL: 5 % (ref 5–12)
Lab: ABNORMAL
Lab: NORMAL
M PNEUMO IGG SER IA-ACNC: NOT DETECTED
MACROCYTES BLD QL SMEAR: ABNORMAL
MACROCYTES BLD QL SMEAR: ABNORMAL
MAGNESIUM SERPL-MCNC: 1.6 MG/DL (ref 1.6–2.4)
MAGNESIUM SERPL-MCNC: 1.7 MG/DL (ref 1.6–2.4)
MAGNESIUM SERPL-MCNC: 2 MG/DL (ref 1.6–2.4)
MAGNESIUM SERPL-MCNC: 2.1 MG/DL (ref 1.6–2.4)
MAXIMAL PREDICTED HEART RATE: 158 BPM
MCH RBC QN AUTO: 25.1 PG (ref 26.6–33)
MCH RBC QN AUTO: 25.1 PG (ref 26.6–33)
MCH RBC QN AUTO: 25.2 PG (ref 26.6–33)
MCH RBC QN AUTO: 25.3 PG (ref 26.6–33)
MCH RBC QN AUTO: 25.3 PG (ref 26.6–33)
MCH RBC QN AUTO: 25.7 PG (ref 26.6–33)
MCH RBC QN AUTO: 25.9 PG (ref 26.6–33)
MCH RBC QN AUTO: 26.7 PG (ref 26.6–33)
MCHC RBC AUTO-ENTMCNC: 29.6 G/DL (ref 31.5–35.7)
MCHC RBC AUTO-ENTMCNC: 30.2 G/DL (ref 31.5–35.7)
MCHC RBC AUTO-ENTMCNC: 30.4 G/DL (ref 31.5–35.7)
MCHC RBC AUTO-ENTMCNC: 30.8 G/DL (ref 31.5–35.7)
MCHC RBC AUTO-ENTMCNC: 30.9 G/DL (ref 31.5–35.7)
MCHC RBC AUTO-ENTMCNC: 31 G/DL (ref 31.5–35.7)
MCHC RBC AUTO-ENTMCNC: 31.1 G/DL (ref 31.5–35.7)
MCHC RBC AUTO-ENTMCNC: 31.3 G/DL (ref 31.5–35.7)
MCHC RBC AUTO-ENTMCNC: 31.3 G/DL (ref 31.5–35.7)
MCHC RBC AUTO-ENTMCNC: 31.8 G/DL (ref 31.5–35.7)
MCV RBC AUTO: 80.6 FL (ref 79–97)
MCV RBC AUTO: 80.7 FL (ref 79–97)
MCV RBC AUTO: 81.2 FL (ref 79–97)
MCV RBC AUTO: 81.5 FL (ref 79–97)
MCV RBC AUTO: 81.8 FL (ref 79–97)
MCV RBC AUTO: 83.1 FL (ref 79–97)
MCV RBC AUTO: 83.2 FL (ref 79–97)
MCV RBC AUTO: 83.9 FL (ref 79–97)
MCV RBC AUTO: 84.8 FL (ref 79–97)
MCV RBC AUTO: 85.2 FL (ref 79–97)
METHADONE UR QL SCN: NEGATIVE
MODALITY: ABNORMAL
MONOCYTES # BLD AUTO: 0.42 10*3/MM3 (ref 0.1–0.9)
MONOCYTES # BLD AUTO: 0.51 10*3/MM3 (ref 0.1–0.9)
MONOCYTES # BLD AUTO: 0.52 10*3/MM3 (ref 0.1–0.9)
MONOCYTES # BLD AUTO: 0.64 10*3/MM3 (ref 0.1–0.9)
MONOCYTES # BLD AUTO: 0.74 10*3/MM3 (ref 0.1–0.9)
MONOCYTES # BLD AUTO: 1.21 10*3/MM3 (ref 0.1–0.9)
MONOCYTES NFR BLD AUTO: 11 % (ref 5–12)
MONOCYTES NFR BLD AUTO: 8.5 % (ref 5–12)
MONOCYTES NFR BLD AUTO: 8.6 % (ref 5–12)
MONOCYTES NFR BLD AUTO: 9 % (ref 5–12)
MRSA DNA SPEC QL NAA+PROBE: NORMAL
MYOGLOBIN UR-MCNC: 2 NG/ML (ref 0–13)
NEUTROPHILS # BLD AUTO: 7.81 10*3/MM3 (ref 1.7–7)
NEUTROPHILS # BLD AUTO: 9.83 10*3/MM3 (ref 1.7–7)
NEUTROPHILS NFR BLD AUTO: 11.97 10*3/MM3 (ref 1.7–7)
NEUTROPHILS NFR BLD AUTO: 2.96 10*3/MM3 (ref 1.7–7)
NEUTROPHILS NFR BLD AUTO: 4.91 10*3/MM3 (ref 1.7–7)
NEUTROPHILS NFR BLD AUTO: 64.2 % (ref 42.7–76)
NEUTROPHILS NFR BLD AUTO: 66.4 % (ref 42.7–76)
NEUTROPHILS NFR BLD AUTO: 7.02 10*3/MM3 (ref 1.7–7)
NEUTROPHILS NFR BLD AUTO: 84.6 % (ref 42.7–76)
NEUTROPHILS NFR BLD AUTO: 85.4 % (ref 42.7–76)
NEUTROPHILS NFR BLD MANUAL: 94 % (ref 42.7–76)
NEUTROPHILS NFR BLD MANUAL: 94 % (ref 42.7–76)
NEUTS VAC BLD QL SMEAR: ABNORMAL
NITRITE UR QL STRIP: NEGATIVE
NOTIFIED BY: ABNORMAL
NOTIFIED WHO: ABNORMAL
NRBC BLD AUTO-RTO: 0 /100 WBC (ref 0–0.2)
NRBC BLD AUTO-RTO: 0.4 /100 WBC (ref 0–0.2)
NRBC BLD AUTO-RTO: 1.8 /100 WBC (ref 0–0.2)
NRBC SPEC MANUAL: 2 /100 WBC (ref 0–0.2)
NT-PROBNP SERPL-MCNC: ABNORMAL PG/ML (ref 0–900)
OPIATES UR QL: POSITIVE
OXYCODONE UR QL SCN: NEGATIVE
PATH INTERP BLD-IMP: NORMAL
PATH INTERP BLD-IMP: NORMAL
PATH REPORT.FINAL DX SPEC: NORMAL
PATH REPORT.GROSS SPEC: NORMAL
PAW @ PEAK INSP FLOW SETTING VENT: 31 CMH2O
PCO2 BLDA: 34.7 MM HG (ref 35–45)
PCO2 BLDA: 37.3 MM HG (ref 35–45)
PCO2 BLDA: 38.2 MM HG (ref 35–45)
PCO2 BLDA: 38.5 MM HG (ref 35–45)
PCO2 BLDA: 39.8 MM HG (ref 35–45)
PCO2 BLDA: 41.4 MM HG (ref 35–45)
PCO2 BLDA: 44.6 MM HG (ref 35–45)
PCO2 BLDA: 47.5 MM HG (ref 35–45)
PCO2 BLDA: 47.9 MM HG (ref 35–45)
PCO2 BLDA: 48.3 MM HG (ref 35–45)
PCO2 TEMP ADJ BLD: 34.7 MM HG (ref 35–45)
PCO2 TEMP ADJ BLD: 37.3 MM HG (ref 35–45)
PCO2 TEMP ADJ BLD: 38.2 MM HG (ref 35–45)
PCO2 TEMP ADJ BLD: 38.5 MM HG (ref 35–45)
PCO2 TEMP ADJ BLD: 39.8 MM HG (ref 35–45)
PCO2 TEMP ADJ BLD: 41.4 MM HG (ref 35–45)
PCO2 TEMP ADJ BLD: 44.6 MM HG (ref 35–45)
PCO2 TEMP ADJ BLD: 47.5 MM HG (ref 35–45)
PCO2 TEMP ADJ BLD: 47.9 MM HG (ref 35–45)
PCO2 TEMP ADJ BLD: 48.3 MM HG (ref 35–45)
PCP UR QL SCN: NEGATIVE
PEEP RESPIRATORY: 12 CM[H2O]
PEEP RESPIRATORY: 5 CM[H2O]
PEEP RESPIRATORY: 7.5 CM[H2O]
PEEP RESPIRATORY: 7.5 CM[H2O]
PEEP RESPIRATORY: 9 CM[H2O]
PERCENT MAX PREDICTED HR: 51.9 %
PH BLDA: 7.11 PH UNITS (ref 7.35–7.45)
PH BLDA: 7.13 PH UNITS (ref 7.35–7.45)
PH BLDA: 7.2 PH UNITS (ref 7.35–7.45)
PH BLDA: 7.3 PH UNITS (ref 7.35–7.45)
PH BLDA: 7.3 PH UNITS (ref 7.35–7.45)
PH BLDA: 7.31 PH UNITS (ref 7.35–7.45)
PH BLDA: 7.39 PH UNITS (ref 7.35–7.45)
PH BLDA: 7.41 PH UNITS (ref 7.35–7.45)
PH BLDA: 7.42 PH UNITS (ref 7.35–7.45)
PH BLDA: 7.44 PH UNITS (ref 7.35–7.45)
PH UR STRIP.AUTO: 5.5 [PH] (ref 5–8)
PH, TEMP CORRECTED: 7.11 PH UNITS (ref 7.35–7.45)
PH, TEMP CORRECTED: 7.13 PH UNITS (ref 7.35–7.45)
PH, TEMP CORRECTED: 7.2 PH UNITS (ref 7.35–7.45)
PH, TEMP CORRECTED: 7.3 PH UNITS (ref 7.35–7.45)
PH, TEMP CORRECTED: 7.3 PH UNITS (ref 7.35–7.45)
PH, TEMP CORRECTED: 7.31 PH UNITS (ref 7.35–7.45)
PH, TEMP CORRECTED: 7.39 PH UNITS (ref 7.35–7.45)
PH, TEMP CORRECTED: 7.41 PH UNITS (ref 7.35–7.45)
PH, TEMP CORRECTED: 7.42 PH UNITS (ref 7.35–7.45)
PH, TEMP CORRECTED: 7.44 PH UNITS (ref 7.35–7.45)
PHOSPHATE SERPL-MCNC: 1.5 MG/DL (ref 2.5–4.5)
PHOSPHATE SERPL-MCNC: 2.4 MG/DL (ref 2.5–4.5)
PHOSPHATE SERPL-MCNC: 5 MG/DL (ref 2.5–4.5)
PLATELET # BLD AUTO: 132 10*3/MM3 (ref 140–450)
PLATELET # BLD AUTO: 183 10*3/MM3 (ref 140–450)
PLATELET # BLD AUTO: 218 10*3/MM3 (ref 140–450)
PLATELET # BLD AUTO: 44 10*3/MM3 (ref 140–450)
PLATELET # BLD AUTO: 54 10*3/MM3 (ref 140–450)
PLATELET # BLD AUTO: 54 10*3/MM3 (ref 140–450)
PLATELET # BLD AUTO: 60 10*3/MM3 (ref 140–450)
PLATELET # BLD AUTO: 64 10*3/MM3 (ref 140–450)
PLATELET # BLD AUTO: 68 10*3/MM3 (ref 140–450)
PLATELET # BLD AUTO: 73 10*3/MM3 (ref 140–450)
PMV BLD AUTO: 10 FL (ref 6–12)
PMV BLD AUTO: 10.9 FL (ref 6–12)
PMV BLD AUTO: 9.5 FL (ref 6–12)
PMV BLD AUTO: ABNORMAL FL
PO2 BLDA: 115 MM HG (ref 83–108)
PO2 BLDA: 363 MM HG (ref 83–108)
PO2 BLDA: 56 MM HG (ref 83–108)
PO2 BLDA: 60.5 MM HG (ref 83–108)
PO2 BLDA: 61.7 MM HG (ref 83–108)
PO2 BLDA: 63.4 MM HG (ref 83–108)
PO2 BLDA: 66.9 MM HG (ref 83–108)
PO2 BLDA: 67.8 MM HG (ref 83–108)
PO2 BLDA: 72.8 MM HG (ref 83–108)
PO2 BLDA: 83.1 MM HG (ref 83–108)
PO2 TEMP ADJ BLD: 115 MM HG (ref 83–108)
PO2 TEMP ADJ BLD: 363 MM HG (ref 83–108)
PO2 TEMP ADJ BLD: 56 MM HG (ref 83–108)
PO2 TEMP ADJ BLD: 60.5 MM HG (ref 83–108)
PO2 TEMP ADJ BLD: 61.7 MM HG (ref 83–108)
PO2 TEMP ADJ BLD: 63.4 MM HG (ref 83–108)
PO2 TEMP ADJ BLD: 66.9 MM HG (ref 83–108)
PO2 TEMP ADJ BLD: 67.8 MM HG (ref 83–108)
PO2 TEMP ADJ BLD: 72.8 MM HG (ref 83–108)
PO2 TEMP ADJ BLD: 83.1 MM HG (ref 83–108)
POLYCHROMASIA BLD QL SMEAR: ABNORMAL
POTASSIUM SERPL-SCNC: 3.5 MMOL/L (ref 3.5–5.2)
POTASSIUM SERPL-SCNC: 3.7 MMOL/L (ref 3.5–5.2)
POTASSIUM SERPL-SCNC: 3.8 MMOL/L (ref 3.5–5.2)
POTASSIUM SERPL-SCNC: 3.8 MMOL/L (ref 3.5–5.2)
POTASSIUM SERPL-SCNC: 4.1 MMOL/L (ref 3.5–5.2)
POTASSIUM SERPL-SCNC: 4.2 MMOL/L (ref 3.5–5.2)
POTASSIUM SERPL-SCNC: 4.5 MMOL/L (ref 3.5–5.2)
POTASSIUM SERPL-SCNC: 4.6 MMOL/L (ref 3.5–5.2)
POTASSIUM SERPL-SCNC: 4.9 MMOL/L (ref 3.5–5.2)
POTASSIUM SERPL-SCNC: 5 MMOL/L (ref 3.5–5.2)
PROPOXYPH UR QL: NEGATIVE
PROT C ACT/NOR PPP: 18 % (ref 73–180)
PROT S ACT/NOR PPP: 30 % (ref 63–140)
PROT SERPL-MCNC: 6.3 G/DL (ref 6–8.5)
PROT SERPL-MCNC: 6.3 G/DL (ref 6–8.5)
PROT SERPL-MCNC: 6.6 G/DL (ref 6–8.5)
PROT SERPL-MCNC: 6.8 G/DL (ref 6–8.5)
PROT SERPL-MCNC: 7 G/DL (ref 6–8.5)
PROT SERPL-MCNC: 7.3 G/DL (ref 6–8.5)
PROT SERPL-MCNC: 7.5 G/DL (ref 6–8.5)
PROT SERPL-MCNC: 8 G/DL (ref 6–8.5)
PROT SERPL-MCNC: 8 G/DL (ref 6–8.5)
PROT UR QL STRIP: NEGATIVE
PROTHROMBIN TIME: 23.5 SECONDS (ref 11.9–14.6)
PROTHROMBIN TIME: 29.4 SECONDS (ref 11.9–14.6)
QT INTERVAL: 474 MS
QTC INTERVAL: 536 MS
RBC # BLD AUTO: 3 10*6/MM3 (ref 3.77–5.28)
RBC # BLD AUTO: 3.02 10*6/MM3 (ref 3.77–5.28)
RBC # BLD AUTO: 3.09 10*6/MM3 (ref 3.77–5.28)
RBC # BLD AUTO: 3.3 10*6/MM3 (ref 3.77–5.28)
RBC # BLD AUTO: 3.45 10*6/MM3 (ref 3.77–5.28)
RBC # BLD AUTO: 3.58 10*6/MM3 (ref 3.77–5.28)
RBC # BLD AUTO: 3.79 10*6/MM3 (ref 3.77–5.28)
RBC # BLD AUTO: 3.82 10*6/MM3 (ref 3.77–5.28)
RBC # BLD AUTO: 3.98 10*6/MM3 (ref 3.77–5.28)
RBC # BLD AUTO: 4.15 10*6/MM3 (ref 3.77–5.28)
RBC # UR: ABNORMAL /HPF
REF LAB TEST METHOD: ABNORMAL
RETICS # AUTO: 0.03 10*6/MM3 (ref 0.02–0.13)
RETICS/RBC NFR AUTO: 0.9 % (ref 0.7–1.9)
RHINOVIRUS RNA SPEC NAA+PROBE: NOT DETECTED
RSV RNA NPH QL NAA+NON-PROBE: NOT DETECTED
S PNEUM AG SPEC QL LA: NEGATIVE
SALICYLATES SERPL-MCNC: <0.3 MG/DL
SAO2 % BLDCOA: 83.5 % (ref 94–99)
SAO2 % BLDCOA: 85 % (ref 94–99)
SAO2 % BLDCOA: 86.4 % (ref 94–99)
SAO2 % BLDCOA: 90.8 % (ref 94–99)
SAO2 % BLDCOA: 91 % (ref 94–99)
SAO2 % BLDCOA: 92.2 % (ref 94–99)
SAO2 % BLDCOA: 92.6 % (ref 94–99)
SAO2 % BLDCOA: 95.2 % (ref 94–99)
SAO2 % BLDCOA: 99.7 % (ref 94–99)
SAO2 % BLDCOA: >100.1 % (ref 94–99)
SARS-COV-2 RDRP RESP QL NAA+PROBE: NOT DETECTED
SARS-COV-2 RNA PNL SPEC NAA+PROBE: NOT DETECTED
SARS-COV-2 RNA RESP QL NAA+PROBE: NOT DETECTED
SARS-COV-2 RNA RESP QL NAA+PROBE: NOT DETECTED
SET MECH RESP RATE: 12
SET MECH RESP RATE: 16
SET MECH RESP RATE: 18
SET MECH RESP RATE: 26
SET MECH RESP RATE: 32
SMALL PLATELETS BLD QL SMEAR: ABNORMAL
SMALL PLATELETS BLD QL SMEAR: ABNORMAL
SODIUM SERPL-SCNC: 121 MMOL/L (ref 136–145)
SODIUM SERPL-SCNC: 125 MMOL/L (ref 136–145)
SODIUM SERPL-SCNC: 126 MMOL/L (ref 136–145)
SODIUM SERPL-SCNC: 127 MMOL/L (ref 136–145)
SODIUM SERPL-SCNC: 139 MMOL/L (ref 136–145)
SODIUM SERPL-SCNC: 145 MMOL/L (ref 136–145)
SODIUM SERPL-SCNC: 146 MMOL/L (ref 136–145)
SODIUM SERPL-SCNC: 148 MMOL/L (ref 136–145)
SODIUM SERPL-SCNC: 150 MMOL/L (ref 136–145)
SODIUM SERPL-SCNC: 153 MMOL/L (ref 136–145)
SODIUM SERPL-SCNC: 155 MMOL/L (ref 136–145)
SP GR UR STRIP: 1.01 (ref 1–1.03)
SQUAMOUS #/AREA URNS HPF: ABNORMAL /HPF
STRESS BASELINE BP: NORMAL MMHG
STRESS BASELINE HR: 72 BPM
STRESS PERCENT HR: 61 %
STRESS POST EXERCISE DUR SEC: 10 SEC
STRESS POST PEAK BP: NORMAL MMHG
STRESS POST PEAK HR: 82 BPM
STRESS TARGET HR: 134 BPM
TARGETS BLD QL SMEAR: ABNORMAL
TARGETS BLD QL SMEAR: ABNORMAL
TIBC SERPL-MCNC: 526 MCG/DL (ref 298–536)
TRANSFERRIN SERPL-MCNC: 353 MG/DL (ref 200–360)
TRICYCLICS UR QL SCN: NEGATIVE
TRIGL SERPL-MCNC: 80 MG/DL (ref 0–150)
TROPONIN T SERPL-MCNC: 0.06 NG/ML (ref 0–0.03)
TROPONIN T SERPL-MCNC: 0.06 NG/ML (ref 0–0.03)
TROPONIN T SERPL-MCNC: 0.07 NG/ML (ref 0–0.03)
TROPONIN T SERPL-MCNC: 0.07 NG/ML (ref 0–0.03)
URINE MYOGLOBIN, QUALITATIVE: POSITIVE
UROBILINOGEN UR QL STRIP: ABNORMAL
VANCOMYCIN TROUGH SERPL-MCNC: 22.7 MCG/ML (ref 5–20)
VENTILATOR MODE: ABNORMAL
VENTILATOR MODE: AC
VIT B12 BLD-MCNC: >2000 PG/ML (ref 211–946)
VLDLC SERPL-MCNC: 17 MG/DL (ref 5–40)
VT ON VENT VENT: 400 ML
VT ON VENT VENT: 450 ML
VT ON VENT VENT: 500 ML
WBC # BLD AUTO: 10.05 10*3/MM3 (ref 3.4–10.8)
WBC # BLD AUTO: 10.22 10*3/MM3 (ref 3.4–10.8)
WBC # BLD AUTO: 10.46 10*3/MM3 (ref 3.4–10.8)
WBC # BLD AUTO: 10.8 10*3/MM3 (ref 3.4–10.8)
WBC # BLD AUTO: 14.16 10*3/MM3 (ref 3.4–10.8)
WBC # BLD AUTO: 3.94 10*3/MM3 (ref 3.4–10.8)
WBC # BLD AUTO: 4.62 10*3/MM3 (ref 3.4–10.8)
WBC # BLD AUTO: 7.41 10*3/MM3 (ref 3.4–10.8)
WBC # BLD AUTO: 8.22 10*3/MM3 (ref 3.4–10.8)
WBC # BLD AUTO: 8.31 10*3/MM3 (ref 3.4–10.8)
WBC MORPH BLD: NORMAL
WBC UR QL AUTO: ABNORMAL /HPF
WHOLE BLOOD HOLD SPECIMEN: NORMAL

## 2020-01-01 PROCEDURE — 71045 X-RAY EXAM CHEST 1 VIEW: CPT

## 2020-01-01 PROCEDURE — 99284 EMERGENCY DEPT VISIT MOD MDM: CPT

## 2020-01-01 PROCEDURE — 51702 INSERT TEMP BLADDER CATH: CPT

## 2020-01-01 PROCEDURE — 83735 ASSAY OF MAGNESIUM: CPT | Performed by: EMERGENCY MEDICINE

## 2020-01-01 PROCEDURE — 25010000002 PROPOFOL 10 MG/ML EMULSION: Performed by: EMERGENCY MEDICINE

## 2020-01-01 PROCEDURE — 99291 CRITICAL CARE FIRST HOUR: CPT

## 2020-01-01 PROCEDURE — 78452 HT MUSCLE IMAGE SPECT MULT: CPT | Performed by: INTERNAL MEDICINE

## 2020-01-01 PROCEDURE — 94799 UNLISTED PULMONARY SVC/PX: CPT

## 2020-01-01 PROCEDURE — 84100 ASSAY OF PHOSPHORUS: CPT | Performed by: INTERNAL MEDICINE

## 2020-01-01 PROCEDURE — 93356 MYOCRD STRAIN IMG SPCKL TRCK: CPT | Performed by: INTERNAL MEDICINE

## 2020-01-01 PROCEDURE — 82803 BLOOD GASES ANY COMBINATION: CPT

## 2020-01-01 PROCEDURE — 87804 INFLUENZA ASSAY W/OPTIC: CPT | Performed by: EMERGENCY MEDICINE

## 2020-01-01 PROCEDURE — 85610 PROTHROMBIN TIME: CPT | Performed by: INTERNAL MEDICINE

## 2020-01-01 PROCEDURE — 63710000001 PREDNISONE PER 1 MG: Performed by: INTERNAL MEDICINE

## 2020-01-01 PROCEDURE — 87040 BLOOD CULTURE FOR BACTERIA: CPT | Performed by: NURSE PRACTITIONER

## 2020-01-01 PROCEDURE — 88305 TISSUE EXAM BY PATHOLOGIST: CPT | Performed by: OTOLARYNGOLOGY

## 2020-01-01 PROCEDURE — 80053 COMPREHEN METABOLIC PANEL: CPT | Performed by: EMERGENCY MEDICINE

## 2020-01-01 PROCEDURE — 25010000002 CEFTRIAXONE PER 250 MG: Performed by: INTERNAL MEDICINE

## 2020-01-01 PROCEDURE — 33285 INSJ SUBQ CAR RHYTHM MNTR: CPT

## 2020-01-01 PROCEDURE — 93306 TTE W/DOPPLER COMPLETE: CPT | Performed by: INTERNAL MEDICINE

## 2020-01-01 PROCEDURE — 86140 C-REACTIVE PROTEIN: CPT | Performed by: EMERGENCY MEDICINE

## 2020-01-01 PROCEDURE — 25010000002 VANCOMYCIN 10 G RECONSTITUTED SOLUTION: Performed by: INTERNAL MEDICINE

## 2020-01-01 PROCEDURE — 99223 1ST HOSP IP/OBS HIGH 75: CPT | Performed by: INTERNAL MEDICINE

## 2020-01-01 PROCEDURE — 93005 ELECTROCARDIOGRAM TRACING: CPT | Performed by: FAMILY MEDICINE

## 2020-01-01 PROCEDURE — 72125 CT NECK SPINE W/O DYE: CPT

## 2020-01-01 PROCEDURE — 80307 DRUG TEST PRSMV CHEM ANLYZR: CPT | Performed by: EMERGENCY MEDICINE

## 2020-01-01 PROCEDURE — G0463 HOSPITAL OUTPT CLINIC VISIT: HCPCS | Performed by: RADIOLOGY

## 2020-01-01 PROCEDURE — 85300 ANTITHROMBIN III ACTIVITY: CPT | Performed by: EMERGENCY MEDICINE

## 2020-01-01 PROCEDURE — 80053 COMPREHEN METABOLIC PANEL: CPT | Performed by: OTOLARYNGOLOGY

## 2020-01-01 PROCEDURE — 36415 COLL VENOUS BLD VENIPUNCTURE: CPT

## 2020-01-01 PROCEDURE — 85303 CLOT INHIBIT PROT C ACTIVITY: CPT | Performed by: EMERGENCY MEDICINE

## 2020-01-01 PROCEDURE — 80053 COMPREHEN METABOLIC PANEL: CPT | Performed by: INTERNAL MEDICINE

## 2020-01-01 PROCEDURE — 93010 ELECTROCARDIOGRAM REPORT: CPT | Performed by: INTERNAL MEDICINE

## 2020-01-01 PROCEDURE — 93880 EXTRACRANIAL BILAT STUDY: CPT | Performed by: SURGERY

## 2020-01-01 PROCEDURE — 94770: CPT

## 2020-01-01 PROCEDURE — 70450 CT HEAD/BRAIN W/O DYE: CPT

## 2020-01-01 PROCEDURE — 84484 ASSAY OF TROPONIN QUANT: CPT | Performed by: INTERNAL MEDICINE

## 2020-01-01 PROCEDURE — 25010000002 CEFEPIME PER 500 MG: Performed by: INTERNAL MEDICINE

## 2020-01-01 PROCEDURE — 0BH18EZ INSERTION OF ENDOTRACHEAL AIRWAY INTO TRACHEA, VIA NATURAL OR ARTIFICIAL OPENING ENDOSCOPIC: ICD-10-PCS | Performed by: EMERGENCY MEDICINE

## 2020-01-01 PROCEDURE — G0432 EIA HIV-1/HIV-2 SCREEN: HCPCS | Performed by: INTERNAL MEDICINE

## 2020-01-01 PROCEDURE — 84630 ASSAY OF ZINC: CPT | Performed by: INTERNAL MEDICINE

## 2020-01-01 PROCEDURE — 87635 SARS-COV-2 COVID-19 AMP PRB: CPT

## 2020-01-01 PROCEDURE — 36600 WITHDRAWAL OF ARTERIAL BLOOD: CPT

## 2020-01-01 PROCEDURE — 99233 SBSQ HOSP IP/OBS HIGH 50: CPT | Performed by: INTERNAL MEDICINE

## 2020-01-01 PROCEDURE — 99204 OFFICE O/P NEW MOD 45 MIN: CPT | Performed by: OTOLARYNGOLOGY

## 2020-01-01 PROCEDURE — 82550 ASSAY OF CK (CPK): CPT | Performed by: INTERNAL MEDICINE

## 2020-01-01 PROCEDURE — 99214 OFFICE O/P EST MOD 30 MIN: CPT | Performed by: OTOLARYNGOLOGY

## 2020-01-01 PROCEDURE — 99024 POSTOP FOLLOW-UP VISIT: CPT | Performed by: OTOLARYNGOLOGY

## 2020-01-01 PROCEDURE — 99204 OFFICE O/P NEW MOD 45 MIN: CPT | Performed by: INTERNAL MEDICINE

## 2020-01-01 PROCEDURE — 25010000002 REGADENOSON 0.4 MG/5ML SOLUTION: Performed by: INTERNAL MEDICINE

## 2020-01-01 PROCEDURE — 11105 PUNCH BX SKIN EA SEP/ADDL: CPT | Performed by: OTOLARYNGOLOGY

## 2020-01-01 PROCEDURE — 94003 VENT MGMT INPAT SUBQ DAY: CPT

## 2020-01-01 PROCEDURE — 83690 ASSAY OF LIPASE: CPT | Performed by: EMERGENCY MEDICINE

## 2020-01-01 PROCEDURE — 76705 ECHO EXAM OF ABDOMEN: CPT

## 2020-01-01 PROCEDURE — 82607 VITAMIN B-12: CPT | Performed by: INTERNAL MEDICINE

## 2020-01-01 PROCEDURE — 93970 EXTREMITY STUDY: CPT

## 2020-01-01 PROCEDURE — S0260 H&P FOR SURGERY: HCPCS | Performed by: OTOLARYNGOLOGY

## 2020-01-01 PROCEDURE — 85027 COMPLETE CBC AUTOMATED: CPT | Performed by: INTERNAL MEDICINE

## 2020-01-01 PROCEDURE — 11104 PUNCH BX SKIN SINGLE LESION: CPT | Performed by: OTOLARYNGOLOGY

## 2020-01-01 PROCEDURE — 25010000002 PHENYLEPHRINE 10 MG/ML SOLUTION 5 ML VIAL: Performed by: INTERNAL MEDICINE

## 2020-01-01 PROCEDURE — 85362 FIBRIN DEGRADATION PRODUCTS: CPT | Performed by: INTERNAL MEDICINE

## 2020-01-01 PROCEDURE — 87040 BLOOD CULTURE FOR BACTERIA: CPT | Performed by: EMERGENCY MEDICINE

## 2020-01-01 PROCEDURE — C9803 HOPD COVID-19 SPEC COLLECT: HCPCS

## 2020-01-01 PROCEDURE — 25010000002 DEXAMETHASONE PER 1 MG: Performed by: NURSE ANESTHETIST, CERTIFIED REGISTERED

## 2020-01-01 PROCEDURE — 86431 RHEUMATOID FACTOR QUANT: CPT | Performed by: INTERNAL MEDICINE

## 2020-01-01 PROCEDURE — 78452 HT MUSCLE IMAGE SPECT MULT: CPT

## 2020-01-01 PROCEDURE — 25010000002 VANCOMYCIN 10 G RECONSTITUTED SOLUTION: Performed by: NURSE PRACTITIONER

## 2020-01-01 PROCEDURE — 93306 TTE W/DOPPLER COMPLETE: CPT

## 2020-01-01 PROCEDURE — 25010000002 FENTANYL CITRATE (PF) 100 MCG/2ML SOLUTION: Performed by: NURSE ANESTHETIST, CERTIFIED REGISTERED

## 2020-01-01 PROCEDURE — 85730 THROMBOPLASTIN TIME PARTIAL: CPT | Performed by: EMERGENCY MEDICINE

## 2020-01-01 PROCEDURE — A9577 INJ MULTIHANCE: HCPCS | Performed by: RADIOLOGY

## 2020-01-01 PROCEDURE — 83605 ASSAY OF LACTIC ACID: CPT | Performed by: INTERNAL MEDICINE

## 2020-01-01 PROCEDURE — 85306 CLOT INHIBIT PROT S FREE: CPT | Performed by: EMERGENCY MEDICINE

## 2020-01-01 PROCEDURE — C9803 HOPD COVID-19 SPEC COLLECT: HCPCS | Performed by: OTOLARYNGOLOGY

## 2020-01-01 PROCEDURE — 25010000002 VANCOMYCIN 10 G RECONSTITUTED SOLUTION: Performed by: EMERGENCY MEDICINE

## 2020-01-01 PROCEDURE — 0 GADOBENATE DIMEGLUMINE 529 MG/ML SOLUTION: Performed by: RADIOLOGY

## 2020-01-01 PROCEDURE — 25010000002 FENTANYL CITRATE (PF) 100 MCG/2ML SOLUTION 5 ML VIAL: Performed by: INTERNAL MEDICINE

## 2020-01-01 PROCEDURE — 83880 ASSAY OF NATRIURETIC PEPTIDE: CPT | Performed by: INTERNAL MEDICINE

## 2020-01-01 PROCEDURE — 82962 GLUCOSE BLOOD TEST: CPT

## 2020-01-01 PROCEDURE — 25010000002 CEFEPIME PER 500 MG: Performed by: EMERGENCY MEDICINE

## 2020-01-01 PROCEDURE — 83540 ASSAY OF IRON: CPT | Performed by: INTERNAL MEDICINE

## 2020-01-01 PROCEDURE — A9500 TC99M SESTAMIBI: HCPCS | Performed by: INTERNAL MEDICINE

## 2020-01-01 PROCEDURE — 25010000002 PHENYLEPHRINE HCL 0.8 MG/10ML SOLUTION PREFILLED SYRINGE: Performed by: NURSE ANESTHETIST, CERTIFIED REGISTERED

## 2020-01-01 PROCEDURE — 82140 ASSAY OF AMMONIA: CPT | Performed by: EMERGENCY MEDICINE

## 2020-01-01 PROCEDURE — 13133 CMPLX RPR F/C/C/M/N/AX/G/H/F: CPT | Performed by: OTOLARYNGOLOGY

## 2020-01-01 PROCEDURE — 93880 EXTRACRANIAL BILAT STUDY: CPT

## 2020-01-01 PROCEDURE — 71046 X-RAY EXAM CHEST 2 VIEWS: CPT

## 2020-01-01 PROCEDURE — 0100U HC BIOFIRE FILMARRAY RESP PANEL 2: CPT | Performed by: INTERNAL MEDICINE

## 2020-01-01 PROCEDURE — 85060 BLOOD SMEAR INTERPRETATION: CPT | Performed by: INTERNAL MEDICINE

## 2020-01-01 PROCEDURE — 25010000002 SUCCINYLCHOLINE PER 20 MG: Performed by: NURSE ANESTHETIST, CERTIFIED REGISTERED

## 2020-01-01 PROCEDURE — 70553 MRI BRAIN STEM W/O & W/DYE: CPT

## 2020-01-01 PROCEDURE — 84295 ASSAY OF SERUM SODIUM: CPT | Performed by: FAMILY MEDICINE

## 2020-01-01 PROCEDURE — 87635 SARS-COV-2 COVID-19 AMP PRB: CPT | Performed by: EMERGENCY MEDICINE

## 2020-01-01 PROCEDURE — 11644 EXC F/E/E/N/L MAL+MRG 3.1-4: CPT | Performed by: OTOLARYNGOLOGY

## 2020-01-01 PROCEDURE — 99222 1ST HOSP IP/OBS MODERATE 55: CPT | Performed by: CLINICAL NURSE SPECIALIST

## 2020-01-01 PROCEDURE — 93005 ELECTROCARDIOGRAM TRACING: CPT

## 2020-01-01 PROCEDURE — 85730 THROMBOPLASTIN TIME PARTIAL: CPT | Performed by: INTERNAL MEDICINE

## 2020-01-01 PROCEDURE — 82728 ASSAY OF FERRITIN: CPT | Performed by: INTERNAL MEDICINE

## 2020-01-01 PROCEDURE — 25010000002 MEROPENEM PER 100 MG: Performed by: INTERNAL MEDICINE

## 2020-01-01 PROCEDURE — 83735 ASSAY OF MAGNESIUM: CPT | Performed by: FAMILY MEDICINE

## 2020-01-01 PROCEDURE — 25010000002 FUROSEMIDE PER 20 MG: Performed by: INTERNAL MEDICINE

## 2020-01-01 PROCEDURE — 25010000002 ONDANSETRON PER 1 MG: Performed by: NURSE ANESTHETIST, CERTIFIED REGISTERED

## 2020-01-01 PROCEDURE — 83010 ASSAY OF HAPTOGLOBIN QUANT: CPT | Performed by: INTERNAL MEDICINE

## 2020-01-01 PROCEDURE — 25010000003 CEFAZOLIN PER 500 MG: Performed by: NURSE ANESTHETIST, CERTIFIED REGISTERED

## 2020-01-01 PROCEDURE — 83735 ASSAY OF MAGNESIUM: CPT | Performed by: INTERNAL MEDICINE

## 2020-01-01 PROCEDURE — 15731 FOREHEAD FLAP W/VASC PEDICLE: CPT | Performed by: OTOLARYNGOLOGY

## 2020-01-01 PROCEDURE — 87641 MR-STAPH DNA AMP PROBE: CPT | Performed by: INTERNAL MEDICINE

## 2020-01-01 PROCEDURE — 87899 AGENT NOS ASSAY W/OPTIC: CPT | Performed by: INTERNAL MEDICINE

## 2020-01-01 PROCEDURE — 0 TECHNETIUM SESTAMIBI: Performed by: INTERNAL MEDICINE

## 2020-01-01 PROCEDURE — 85025 COMPLETE CBC W/AUTO DIFF WBC: CPT | Performed by: FAMILY MEDICINE

## 2020-01-01 PROCEDURE — 85025 COMPLETE CBC W/AUTO DIFF WBC: CPT | Performed by: INTERNAL MEDICINE

## 2020-01-01 PROCEDURE — 85025 COMPLETE CBC W/AUTO DIFF WBC: CPT | Performed by: OTOLARYNGOLOGY

## 2020-01-01 PROCEDURE — 83874 ASSAY OF MYOGLOBIN: CPT | Performed by: INTERNAL MEDICINE

## 2020-01-01 PROCEDURE — 93005 ELECTROCARDIOGRAM TRACING: CPT | Performed by: EMERGENCY MEDICINE

## 2020-01-01 PROCEDURE — 80074 ACUTE HEPATITIS PANEL: CPT | Performed by: INTERNAL MEDICINE

## 2020-01-01 PROCEDURE — 88331 PATH CONSLTJ SURG 1 BLK 1SPC: CPT | Performed by: PATHOLOGY

## 2020-01-01 PROCEDURE — 87205 SMEAR GRAM STAIN: CPT | Performed by: INTERNAL MEDICINE

## 2020-01-01 PROCEDURE — 82565 ASSAY OF CREATININE: CPT

## 2020-01-01 PROCEDURE — 94002 VENT MGMT INPAT INIT DAY: CPT

## 2020-01-01 PROCEDURE — 93000 ELECTROCARDIOGRAM COMPLETE: CPT | Performed by: INTERNAL MEDICINE

## 2020-01-01 PROCEDURE — 80202 ASSAY OF VANCOMYCIN: CPT | Performed by: INTERNAL MEDICINE

## 2020-01-01 PROCEDURE — 25010000002 PERFLUTREN 6.52 MG/ML SUSPENSION: Performed by: INTERNAL MEDICINE

## 2020-01-01 PROCEDURE — 85384 FIBRINOGEN ACTIVITY: CPT | Performed by: INTERNAL MEDICINE

## 2020-01-01 PROCEDURE — 99212 OFFICE O/P EST SF 10 MIN: CPT | Performed by: INTERNAL MEDICINE

## 2020-01-01 PROCEDURE — 99204 OFFICE O/P NEW MOD 45 MIN: CPT | Performed by: PSYCHIATRY & NEUROLOGY

## 2020-01-01 PROCEDURE — 25010000002 PIPERACILLIN SOD-TAZOBACTAM PER 1 G: Performed by: NURSE PRACTITIONER

## 2020-01-01 PROCEDURE — 99232 SBSQ HOSP IP/OBS MODERATE 35: CPT | Performed by: INTERNAL MEDICINE

## 2020-01-01 PROCEDURE — 74018 RADEX ABDOMEN 1 VIEW: CPT

## 2020-01-01 PROCEDURE — 85610 PROTHROMBIN TIME: CPT | Performed by: EMERGENCY MEDICINE

## 2020-01-01 PROCEDURE — 33285 INSJ SUBQ CAR RHYTHM MNTR: CPT | Performed by: INTERNAL MEDICINE

## 2020-01-01 PROCEDURE — 83605 ASSAY OF LACTIC ACID: CPT | Performed by: EMERGENCY MEDICINE

## 2020-01-01 PROCEDURE — 25010000002 LORAZEPAM PER 2 MG: Performed by: INTERNAL MEDICINE

## 2020-01-01 PROCEDURE — 70486 CT MAXILLOFACIAL W/O DYE: CPT

## 2020-01-01 PROCEDURE — 99024 POSTOP FOLLOW-UP VISIT: CPT | Performed by: NURSE PRACTITIONER

## 2020-01-01 PROCEDURE — 85007 BL SMEAR W/DIFF WBC COUNT: CPT | Performed by: INTERNAL MEDICINE

## 2020-01-01 PROCEDURE — 25010000002 DEXAMETHASONE PER 1 MG: Performed by: ANESTHESIOLOGY

## 2020-01-01 PROCEDURE — 13132 CMPLX RPR F/C/C/M/N/AX/G/H/F: CPT | Performed by: OTOLARYNGOLOGY

## 2020-01-01 PROCEDURE — C1751 CATH, INF, PER/CENT/MIDLINE: HCPCS

## 2020-01-01 PROCEDURE — 81001 URINALYSIS AUTO W/SCOPE: CPT | Performed by: EMERGENCY MEDICINE

## 2020-01-01 PROCEDURE — 05HC33Z INSERTION OF INFUSION DEVICE INTO LEFT BASILIC VEIN, PERCUTANEOUS APPROACH: ICD-10-PCS | Performed by: INTERNAL MEDICINE

## 2020-01-01 PROCEDURE — 25010000002 PROPOFOL 10 MG/ML EMULSION: Performed by: NURSE ANESTHETIST, CERTIFIED REGISTERED

## 2020-01-01 PROCEDURE — 86038 ANTINUCLEAR ANTIBODIES: CPT | Performed by: INTERNAL MEDICINE

## 2020-01-01 PROCEDURE — 80061 LIPID PANEL: CPT | Performed by: INTERNAL MEDICINE

## 2020-01-01 PROCEDURE — 85025 COMPLETE CBC W/AUTO DIFF WBC: CPT | Performed by: EMERGENCY MEDICINE

## 2020-01-01 PROCEDURE — 93018 CV STRESS TEST I&R ONLY: CPT | Performed by: INTERNAL MEDICINE

## 2020-01-01 PROCEDURE — 86880 COOMBS TEST DIRECT: CPT | Performed by: INTERNAL MEDICINE

## 2020-01-01 PROCEDURE — 80053 COMPREHEN METABOLIC PANEL: CPT | Performed by: FAMILY MEDICINE

## 2020-01-01 PROCEDURE — 99214 OFFICE O/P EST MOD 30 MIN: CPT | Performed by: INTERNAL MEDICINE

## 2020-01-01 PROCEDURE — 93017 CV STRESS TEST TRACING ONLY: CPT

## 2020-01-01 PROCEDURE — C1764 EVENT RECORDER, CARDIAC: HCPCS

## 2020-01-01 PROCEDURE — 82746 ASSAY OF FOLIC ACID SERUM: CPT | Performed by: INTERNAL MEDICINE

## 2020-01-01 PROCEDURE — 93356 MYOCRD STRAIN IMG SPCKL TRCK: CPT

## 2020-01-01 PROCEDURE — 36410 VNPNXR 3YR/> PHY/QHP DX/THER: CPT

## 2020-01-01 PROCEDURE — 87070 CULTURE OTHR SPECIMN AEROBIC: CPT | Performed by: INTERNAL MEDICINE

## 2020-01-01 PROCEDURE — 82550 ASSAY OF CK (CPK): CPT | Performed by: EMERGENCY MEDICINE

## 2020-01-01 PROCEDURE — 80048 BASIC METABOLIC PNL TOTAL CA: CPT | Performed by: OTOLARYNGOLOGY

## 2020-01-01 PROCEDURE — 92950 HEART/LUNG RESUSCITATION CPR: CPT

## 2020-01-01 PROCEDURE — 87635 SARS-COV-2 COVID-19 AMP PRB: CPT | Performed by: INTERNAL MEDICINE

## 2020-01-01 PROCEDURE — 74176 CT ABD & PELVIS W/O CONTRAST: CPT

## 2020-01-01 PROCEDURE — 83615 LACTATE (LD) (LDH) ENZYME: CPT | Performed by: INTERNAL MEDICINE

## 2020-01-01 PROCEDURE — 71250 CT THORAX DX C-: CPT

## 2020-01-01 PROCEDURE — 85027 COMPLETE CBC AUTOMATED: CPT | Performed by: OTOLARYNGOLOGY

## 2020-01-01 PROCEDURE — 93970 EXTREMITY STUDY: CPT | Performed by: SURGERY

## 2020-01-01 PROCEDURE — 25010000002 EPINEPHRINE 1 MG/10ML SOLUTION PREFILLED SYRINGE: Performed by: INTERNAL MEDICINE

## 2020-01-01 PROCEDURE — 31500 INSERT EMERGENCY AIRWAY: CPT

## 2020-01-01 PROCEDURE — 85045 AUTOMATED RETICULOCYTE COUNT: CPT | Performed by: INTERNAL MEDICINE

## 2020-01-01 PROCEDURE — 5A1955Z RESPIRATORY VENTILATION, GREATER THAN 96 CONSECUTIVE HOURS: ICD-10-PCS | Performed by: INTERNAL MEDICINE

## 2020-01-01 PROCEDURE — 73560 X-RAY EXAM OF KNEE 1 OR 2: CPT

## 2020-01-01 PROCEDURE — 85384 FIBRINOGEN ACTIVITY: CPT | Performed by: EMERGENCY MEDICINE

## 2020-01-01 PROCEDURE — U0003 INFECTIOUS AGENT DETECTION BY NUCLEIC ACID (DNA OR RNA); SEVERE ACUTE RESPIRATORY SYNDROME CORONAVIRUS 2 (SARS-COV-2) (CORONAVIRUS DISEASE [COVID-19]), AMPLIFIED PROBE TECHNIQUE, MAKING USE OF HIGH THROUGHPUT TECHNOLOGIES AS DESCRIBED BY CMS-2020-01-R: HCPCS | Performed by: OTOLARYNGOLOGY

## 2020-01-01 PROCEDURE — 83605 ASSAY OF LACTIC ACID: CPT | Performed by: NURSE PRACTITIONER

## 2020-01-01 PROCEDURE — 82525 ASSAY OF COPPER: CPT | Performed by: INTERNAL MEDICINE

## 2020-01-01 PROCEDURE — 15630 DELAY FLAP EYE/NOS/EAR/LIP: CPT | Performed by: OTOLARYNGOLOGY

## 2020-01-01 PROCEDURE — 84466 ASSAY OF TRANSFERRIN: CPT | Performed by: INTERNAL MEDICINE

## 2020-01-01 PROCEDURE — 85379 FIBRIN DEGRADATION QUANT: CPT | Performed by: EMERGENCY MEDICINE

## 2020-01-01 RX ORDER — ACETAMINOPHEN 160 MG/5ML
650 SOLUTION ORAL EVERY 4 HOURS PRN
Status: DISCONTINUED | OUTPATIENT
Start: 2020-01-01 | End: 2020-01-01 | Stop reason: HOSPADM

## 2020-01-01 RX ORDER — OXYCODONE AND ACETAMINOPHEN 7.5; 325 MG/1; MG/1
2 TABLET ORAL EVERY 4 HOURS PRN
Status: DISCONTINUED | OUTPATIENT
Start: 2020-01-01 | End: 2020-01-01 | Stop reason: HOSPADM

## 2020-01-01 RX ORDER — SODIUM CHLORIDE 0.9 % (FLUSH) 0.9 %
3-10 SYRINGE (ML) INJECTION AS NEEDED
Status: DISCONTINUED | OUTPATIENT
Start: 2020-01-01 | End: 2020-01-01 | Stop reason: HOSPADM

## 2020-01-01 RX ORDER — FLUMAZENIL 0.1 MG/ML
0.2 INJECTION INTRAVENOUS AS NEEDED
Status: DISCONTINUED | OUTPATIENT
Start: 2020-01-01 | End: 2020-01-01 | Stop reason: HOSPADM

## 2020-01-01 RX ORDER — POTASSIUM CHLORIDE 20MEQ/15ML
20 LIQUID (ML) ORAL
Status: COMPLETED | OUTPATIENT
Start: 2020-01-01 | End: 2020-01-01

## 2020-01-01 RX ORDER — FENTANYL CITRATE 50 UG/ML
INJECTION, SOLUTION INTRAMUSCULAR; INTRAVENOUS AS NEEDED
Status: DISCONTINUED | OUTPATIENT
Start: 2020-01-01 | End: 2020-01-01 | Stop reason: SURG

## 2020-01-01 RX ORDER — DILTIAZEM HYDROCHLORIDE 120 MG/1
120 CAPSULE, COATED, EXTENDED RELEASE ORAL DAILY
Qty: 30 CAPSULE | Refills: 11 | Status: SHIPPED | OUTPATIENT
Start: 2020-01-01

## 2020-01-01 RX ORDER — IBUPROFEN 600 MG/1
600 TABLET ORAL ONCE AS NEEDED
Status: DISCONTINUED | OUTPATIENT
Start: 2020-01-01 | End: 2020-01-01 | Stop reason: HOSPADM

## 2020-01-01 RX ORDER — BUDESONIDE 0.5 MG/2ML
0.5 INHALANT ORAL
Status: DISCONTINUED | OUTPATIENT
Start: 2020-01-01 | End: 2020-01-01 | Stop reason: HOSPADM

## 2020-01-01 RX ORDER — ACETAMINOPHEN 325 MG/1
650 TABLET ORAL EVERY 4 HOURS PRN
Status: DISCONTINUED | OUTPATIENT
Start: 2020-01-01 | End: 2020-01-01

## 2020-01-01 RX ORDER — HYDROCODONE BITARTRATE AND ACETAMINOPHEN 7.5; 325 MG/1; MG/1
1 TABLET ORAL ONCE AS NEEDED
Status: DISCONTINUED | OUTPATIENT
Start: 2020-01-01 | End: 2020-01-01 | Stop reason: HOSPADM

## 2020-01-01 RX ORDER — LORAZEPAM 2 MG/ML
1 INJECTION INTRAMUSCULAR EVERY 4 HOURS PRN
Status: DISCONTINUED | OUTPATIENT
Start: 2020-01-01 | End: 2020-01-01 | Stop reason: HOSPADM

## 2020-01-01 RX ORDER — DEXTROSE MONOHYDRATE 25 G/50ML
INJECTION, SOLUTION INTRAVENOUS
Status: COMPLETED | OUTPATIENT
Start: 2020-01-01 | End: 2020-01-01

## 2020-01-01 RX ORDER — IPRATROPIUM BROMIDE AND ALBUTEROL SULFATE 2.5; .5 MG/3ML; MG/3ML
3 SOLUTION RESPIRATORY (INHALATION)
Status: DISCONTINUED | OUTPATIENT
Start: 2020-01-01 | End: 2020-01-01 | Stop reason: HOSPADM

## 2020-01-01 RX ORDER — DEXAMETHASONE SODIUM PHOSPHATE 4 MG/ML
INJECTION, SOLUTION INTRA-ARTICULAR; INTRALESIONAL; INTRAMUSCULAR; INTRAVENOUS; SOFT TISSUE AS NEEDED
Status: DISCONTINUED | OUTPATIENT
Start: 2020-01-01 | End: 2020-01-01 | Stop reason: SURG

## 2020-01-01 RX ORDER — ONDANSETRON 2 MG/ML
4 INJECTION INTRAMUSCULAR; INTRAVENOUS ONCE AS NEEDED
Status: DISCONTINUED | OUTPATIENT
Start: 2020-01-01 | End: 2020-01-01 | Stop reason: HOSPADM

## 2020-01-01 RX ORDER — FAMOTIDINE 10 MG/ML
20 INJECTION, SOLUTION INTRAVENOUS
Status: COMPLETED | OUTPATIENT
Start: 2020-01-01 | End: 2020-01-01

## 2020-01-01 RX ORDER — LIDOCAINE HYDROCHLORIDE AND EPINEPHRINE 10; 10 MG/ML; UG/ML
INJECTION, SOLUTION INFILTRATION; PERINEURAL AS NEEDED
Status: DISCONTINUED | OUTPATIENT
Start: 2020-01-01 | End: 2020-01-01 | Stop reason: HOSPADM

## 2020-01-01 RX ORDER — EPINEPHRINE 0.1 MG/ML
SYRINGE (ML) INJECTION
Status: COMPLETED | OUTPATIENT
Start: 2020-01-01 | End: 2020-01-01

## 2020-01-01 RX ORDER — SODIUM CHLORIDE 0.9 % (FLUSH) 0.9 %
10 SYRINGE (ML) INJECTION AS NEEDED
Status: DISCONTINUED | OUTPATIENT
Start: 2020-01-01 | End: 2020-01-01

## 2020-01-01 RX ORDER — MIDAZOLAM HYDROCHLORIDE 1 MG/ML
1 INJECTION INTRAMUSCULAR; INTRAVENOUS
Status: DISCONTINUED | OUTPATIENT
Start: 2020-01-01 | End: 2020-01-01 | Stop reason: HOSPADM

## 2020-01-01 RX ORDER — SODIUM CHLORIDE 0.9 % (FLUSH) 0.9 %
10 SYRINGE (ML) INJECTION AS NEEDED
Status: DISCONTINUED | OUTPATIENT
Start: 2020-01-01 | End: 2020-01-01 | Stop reason: HOSPADM

## 2020-01-01 RX ORDER — CEPHALEXIN 500 MG/1
500 CAPSULE ORAL 3 TIMES DAILY
Qty: 21 CAPSULE | Refills: 0 | Status: ON HOLD | OUTPATIENT
Start: 2020-01-01 | End: 2020-01-01

## 2020-01-01 RX ORDER — NALOXONE HCL 0.4 MG/ML
0.4 VIAL (ML) INJECTION AS NEEDED
Status: DISCONTINUED | OUTPATIENT
Start: 2020-01-01 | End: 2020-01-01 | Stop reason: HOSPADM

## 2020-01-01 RX ORDER — SODIUM CHLORIDE 0.9 % (FLUSH) 0.9 %
10 SYRINGE (ML) INJECTION EVERY 12 HOURS SCHEDULED
Status: DISCONTINUED | OUTPATIENT
Start: 2020-01-01 | End: 2020-01-01 | Stop reason: HOSPADM

## 2020-01-01 RX ORDER — HYDROCODONE BITARTRATE AND ACETAMINOPHEN 10; 325 MG/1; MG/1
1 TABLET ORAL EVERY 4 HOURS PRN
Qty: 18 TABLET | Refills: 0 | Status: ON HOLD | OUTPATIENT
Start: 2020-01-01 | End: 2020-01-01

## 2020-01-01 RX ORDER — BUPIVACAINE HCL/0.9 % NACL/PF 0.1 %
2 PLASTIC BAG, INJECTION (ML) EPIDURAL ONCE
Status: COMPLETED | OUTPATIENT
Start: 2020-01-01 | End: 2020-01-01

## 2020-01-01 RX ORDER — SUFENTANIL CITRATE 50 UG/ML
INJECTION EPIDURAL; INTRAVENOUS AS NEEDED
Status: DISCONTINUED | OUTPATIENT
Start: 2020-01-01 | End: 2020-01-01 | Stop reason: SURG

## 2020-01-01 RX ORDER — ROCURONIUM BROMIDE 10 MG/ML
INJECTION, SOLUTION INTRAVENOUS
Status: COMPLETED | OUTPATIENT
Start: 2020-01-01 | End: 2020-01-01

## 2020-01-01 RX ORDER — EPHEDRINE SULFATE 50 MG/ML
INJECTION, SOLUTION INTRAVENOUS AS NEEDED
Status: DISCONTINUED | OUTPATIENT
Start: 2020-01-01 | End: 2020-01-01 | Stop reason: SURG

## 2020-01-01 RX ORDER — ACETAMINOPHEN 650 MG/1
650 SUPPOSITORY RECTAL EVERY 4 HOURS PRN
Status: DISCONTINUED | OUTPATIENT
Start: 2020-01-01 | End: 2020-01-01

## 2020-01-01 RX ORDER — MAGNESIUM HYDROXIDE 1200 MG/15ML
LIQUID ORAL AS NEEDED
Status: DISCONTINUED | OUTPATIENT
Start: 2020-01-01 | End: 2020-01-01 | Stop reason: HOSPADM

## 2020-01-01 RX ORDER — FAMOTIDINE 10 MG/ML
20 INJECTION, SOLUTION INTRAVENOUS 2 TIMES DAILY
Status: DISCONTINUED | OUTPATIENT
Start: 2020-01-01 | End: 2020-01-01 | Stop reason: HOSPADM

## 2020-01-01 RX ORDER — SODIUM CHLORIDE, SODIUM LACTATE, POTASSIUM CHLORIDE, CALCIUM CHLORIDE 600; 310; 30; 20 MG/100ML; MG/100ML; MG/100ML; MG/100ML
100 INJECTION, SOLUTION INTRAVENOUS CONTINUOUS
Status: DISCONTINUED | OUTPATIENT
Start: 2020-01-01 | End: 2020-01-01 | Stop reason: HOSPADM

## 2020-01-01 RX ORDER — LABETALOL HYDROCHLORIDE 5 MG/ML
5 INJECTION, SOLUTION INTRAVENOUS
Status: DISCONTINUED | OUTPATIENT
Start: 2020-01-01 | End: 2020-01-01 | Stop reason: HOSPADM

## 2020-01-01 RX ORDER — LORAZEPAM 2 MG/ML
0.5 INJECTION INTRAMUSCULAR EVERY 8 HOURS
Status: DISCONTINUED | OUTPATIENT
Start: 2020-01-01 | End: 2020-01-01 | Stop reason: HOSPADM

## 2020-01-01 RX ORDER — HYDROCODONE BITARTRATE AND ACETAMINOPHEN 10; 325 MG/1; MG/1
2 TABLET ORAL 3 TIMES DAILY
COMMUNITY

## 2020-01-01 RX ORDER — NEOSTIGMINE METHYLSULFATE 5 MG/5 ML
SYRINGE (ML) INTRAVENOUS AS NEEDED
Status: DISCONTINUED | OUTPATIENT
Start: 2020-01-01 | End: 2020-01-01 | Stop reason: SURG

## 2020-01-01 RX ORDER — ONDANSETRON 2 MG/ML
INJECTION INTRAMUSCULAR; INTRAVENOUS AS NEEDED
Status: DISCONTINUED | OUTPATIENT
Start: 2020-01-01 | End: 2020-01-01 | Stop reason: SURG

## 2020-01-01 RX ORDER — PREDNISONE 20 MG/1
20 TABLET ORAL EVERY 8 HOURS SCHEDULED
Status: DISCONTINUED | OUTPATIENT
Start: 2020-01-01 | End: 2020-01-01 | Stop reason: HOSPADM

## 2020-01-01 RX ORDER — LOSARTAN POTASSIUM 50 MG/1
50 TABLET ORAL DAILY
COMMUNITY

## 2020-01-01 RX ORDER — GINSENG 100 MG
CAPSULE ORAL AS NEEDED
Status: DISCONTINUED | OUTPATIENT
Start: 2020-01-01 | End: 2020-01-01 | Stop reason: HOSPADM

## 2020-01-01 RX ORDER — ACETAMINOPHEN 500 MG
1000 TABLET ORAL ONCE
Status: COMPLETED | OUTPATIENT
Start: 2020-01-01 | End: 2020-01-01

## 2020-01-01 RX ORDER — ESTRADIOL 1 MG/1
1 TABLET ORAL DAILY
COMMUNITY
Start: 2020-01-01

## 2020-01-01 RX ORDER — SODIUM CHLORIDE, SODIUM LACTATE, POTASSIUM CHLORIDE, CALCIUM CHLORIDE 600; 310; 30; 20 MG/100ML; MG/100ML; MG/100ML; MG/100ML
125 INJECTION, SOLUTION INTRAVENOUS CONTINUOUS
Status: DISCONTINUED | OUTPATIENT
Start: 2020-01-01 | End: 2020-01-01

## 2020-01-01 RX ORDER — FOLIC ACID 1 MG/1
1 TABLET ORAL DAILY
Status: DISCONTINUED | OUTPATIENT
Start: 2020-01-01 | End: 2020-01-01 | Stop reason: HOSPADM

## 2020-01-01 RX ORDER — NICOTINE 21 MG/24HR
1 PATCH, TRANSDERMAL 24 HOURS TRANSDERMAL
Status: DISCONTINUED | OUTPATIENT
Start: 2020-01-01 | End: 2020-01-01 | Stop reason: HOSPADM

## 2020-01-01 RX ORDER — FENTANYL CITRATE 50 UG/ML
25 INJECTION, SOLUTION INTRAMUSCULAR; INTRAVENOUS
Status: DISCONTINUED | OUTPATIENT
Start: 2020-01-01 | End: 2020-01-01 | Stop reason: HOSPADM

## 2020-01-01 RX ORDER — OMEPRAZOLE 40 MG/1
40 CAPSULE, DELAYED RELEASE ORAL DAILY
COMMUNITY
Start: 2020-01-01

## 2020-01-01 RX ORDER — POTASSIUM CHLORIDE 7.45 MG/ML
10 INJECTION INTRAVENOUS
Status: DISCONTINUED | OUTPATIENT
Start: 2020-01-01 | End: 2020-01-01 | Stop reason: HOSPADM

## 2020-01-01 RX ORDER — OXYCODONE AND ACETAMINOPHEN 10; 325 MG/1; MG/1
1 TABLET ORAL ONCE AS NEEDED
Status: DISCONTINUED | OUTPATIENT
Start: 2020-01-01 | End: 2020-01-01 | Stop reason: HOSPADM

## 2020-01-01 RX ORDER — SODIUM CHLORIDE 0.9 % (FLUSH) 0.9 %
3 SYRINGE (ML) INJECTION EVERY 12 HOURS SCHEDULED
Status: DISCONTINUED | OUTPATIENT
Start: 2020-01-01 | End: 2020-01-01 | Stop reason: HOSPADM

## 2020-01-01 RX ORDER — MIDODRINE HYDROCHLORIDE 2.5 MG/1
5 TABLET ORAL
Status: DISCONTINUED | OUTPATIENT
Start: 2020-01-01 | End: 2020-01-01

## 2020-01-01 RX ORDER — BUMETANIDE 0.5 MG/1
0.5 TABLET ORAL DAILY PRN
Qty: 30 TABLET | Refills: 3 | Status: SHIPPED | OUTPATIENT
Start: 2020-01-01

## 2020-01-01 RX ORDER — LIDOCAINE HYDROCHLORIDE 10 MG/ML
1 INJECTION, SOLUTION INFILTRATION; PERINEURAL ONCE
Status: COMPLETED | OUTPATIENT
Start: 2020-01-01 | End: 2020-01-01

## 2020-01-01 RX ORDER — DEXAMETHASONE SODIUM PHOSPHATE 4 MG/ML
4 INJECTION, SOLUTION INTRA-ARTICULAR; INTRALESIONAL; INTRAMUSCULAR; INTRAVENOUS; SOFT TISSUE ONCE AS NEEDED
Status: COMPLETED | OUTPATIENT
Start: 2020-01-01 | End: 2020-01-01

## 2020-01-01 RX ORDER — LORAZEPAM 2 MG/ML
0.5 INJECTION INTRAMUSCULAR EVERY 6 HOURS
Status: DISCONTINUED | OUTPATIENT
Start: 2020-01-01 | End: 2020-01-01

## 2020-01-01 RX ORDER — CHLORHEXIDINE GLUCONATE 0.12 MG/ML
15 RINSE ORAL EVERY 12 HOURS SCHEDULED
Status: DISCONTINUED | OUTPATIENT
Start: 2020-01-01 | End: 2020-01-01 | Stop reason: HOSPADM

## 2020-01-01 RX ORDER — LORAZEPAM 2 MG/ML
0.5 INJECTION INTRAMUSCULAR EVERY 6 HOURS
Status: DISPENSED | OUTPATIENT
Start: 2020-01-01 | End: 2020-01-01

## 2020-01-01 RX ORDER — ACETAMINOPHEN 325 MG/1
650 TABLET ORAL EVERY 4 HOURS PRN
Status: DISCONTINUED | OUTPATIENT
Start: 2020-01-01 | End: 2020-01-01 | Stop reason: HOSPADM

## 2020-01-01 RX ORDER — SUCCINYLCHOLINE CHLORIDE 20 MG/ML
INJECTION INTRAMUSCULAR; INTRAVENOUS AS NEEDED
Status: DISCONTINUED | OUTPATIENT
Start: 2020-01-01 | End: 2020-01-01 | Stop reason: SURG

## 2020-01-01 RX ORDER — LORAZEPAM 2 MG/ML
1 INJECTION INTRAMUSCULAR EVERY 8 HOURS
Status: DISCONTINUED | OUTPATIENT
Start: 2020-01-01 | End: 2020-01-01

## 2020-01-01 RX ORDER — SODIUM CHLORIDE, SODIUM LACTATE, POTASSIUM CHLORIDE, CALCIUM CHLORIDE 600; 310; 30; 20 MG/100ML; MG/100ML; MG/100ML; MG/100ML
1000 INJECTION, SOLUTION INTRAVENOUS CONTINUOUS
Status: DISCONTINUED | OUTPATIENT
Start: 2020-01-01 | End: 2020-01-01 | Stop reason: HOSPADM

## 2020-01-01 RX ORDER — PROPOFOL 10 MG/ML
VIAL (ML) INTRAVENOUS AS NEEDED
Status: DISCONTINUED | OUTPATIENT
Start: 2020-01-01 | End: 2020-01-01 | Stop reason: SURG

## 2020-01-01 RX ORDER — PHENYLEPHRINE HCL IN 0.9% NACL 0.8MG/10ML
SYRINGE (ML) INTRAVENOUS AS NEEDED
Status: DISCONTINUED | OUTPATIENT
Start: 2020-01-01 | End: 2020-01-01 | Stop reason: SURG

## 2020-01-01 RX ORDER — SODIUM CHLORIDE 0.9 % (FLUSH) 0.9 %
3 SYRINGE (ML) INJECTION AS NEEDED
Status: DISCONTINUED | OUTPATIENT
Start: 2020-01-01 | End: 2020-01-01 | Stop reason: HOSPADM

## 2020-01-01 RX ORDER — THIAMINE MONONITRATE (VIT B1) 100 MG
100 TABLET ORAL ONCE
Status: COMPLETED | OUTPATIENT
Start: 2020-01-01 | End: 2020-01-01

## 2020-01-01 RX ORDER — ROCURONIUM BROMIDE 10 MG/ML
INJECTION, SOLUTION INTRAVENOUS AS NEEDED
Status: DISCONTINUED | OUTPATIENT
Start: 2020-01-01 | End: 2020-01-01 | Stop reason: SURG

## 2020-01-01 RX ORDER — FUROSEMIDE 10 MG/ML
40 INJECTION INTRAMUSCULAR; INTRAVENOUS EVERY 12 HOURS
Status: COMPLETED | OUTPATIENT
Start: 2020-01-01 | End: 2020-01-01

## 2020-01-01 RX ORDER — ONDANSETRON 2 MG/ML
4 INJECTION INTRAMUSCULAR; INTRAVENOUS EVERY 6 HOURS PRN
Status: DISCONTINUED | OUTPATIENT
Start: 2020-01-01 | End: 2020-01-01 | Stop reason: HOSPADM

## 2020-01-01 RX ORDER — ETOMIDATE 2 MG/ML
INJECTION INTRAVENOUS
Status: COMPLETED | OUTPATIENT
Start: 2020-01-01 | End: 2020-01-01

## 2020-01-01 RX ORDER — DEXTROSE MONOHYDRATE 50 MG/ML
50 INJECTION, SOLUTION INTRAVENOUS CONTINUOUS
Status: DISCONTINUED | OUTPATIENT
Start: 2020-01-01 | End: 2020-01-01

## 2020-01-01 RX ORDER — CEFAZOLIN SODIUM 1 G/3ML
INJECTION, POWDER, FOR SOLUTION INTRAMUSCULAR; INTRAVENOUS AS NEEDED
Status: DISCONTINUED | OUTPATIENT
Start: 2020-01-01 | End: 2020-01-01 | Stop reason: SURG

## 2020-01-01 RX ORDER — LIDOCAINE HYDROCHLORIDE 20 MG/ML
SOLUTION OROPHARYNGEAL
Status: COMPLETED | OUTPATIENT
Start: 2020-01-01 | End: 2020-01-01

## 2020-01-01 RX ORDER — ACETAMINOPHEN 650 MG/1
650 SUPPOSITORY RECTAL EVERY 4 HOURS PRN
Status: DISCONTINUED | OUTPATIENT
Start: 2020-01-01 | End: 2020-01-01 | Stop reason: HOSPADM

## 2020-01-01 RX ADMIN — CHLORHEXIDINE GLUCONATE 0.12% ORAL RINSE 15 ML: 1.2 LIQUID ORAL at 20:35

## 2020-01-01 RX ADMIN — DEXAMETHASONE SODIUM PHOSPHATE 4 MG: 4 INJECTION, SOLUTION INTRAMUSCULAR; INTRAVENOUS at 10:43

## 2020-01-01 RX ADMIN — CHLORHEXIDINE GLUCONATE 0.12% ORAL RINSE 15 ML: 1.2 LIQUID ORAL at 21:04

## 2020-01-01 RX ADMIN — IPRATROPIUM BROMIDE AND ALBUTEROL SULFATE 3 ML: 2.5; .5 SOLUTION RESPIRATORY (INHALATION) at 07:27

## 2020-01-01 RX ADMIN — OXYCODONE HYDROCHLORIDE AND ACETAMINOPHEN 2 TABLET: 7.5; 325 TABLET ORAL at 14:29

## 2020-01-01 RX ADMIN — CEFEPIME 2 G: 2 INJECTION, POWDER, FOR SOLUTION INTRAVENOUS at 02:25

## 2020-01-01 RX ADMIN — VANCOMYCIN HYDROCHLORIDE 1250 MG: 10 INJECTION, POWDER, LYOPHILIZED, FOR SOLUTION INTRAVENOUS at 05:24

## 2020-01-01 RX ADMIN — IPRATROPIUM BROMIDE AND ALBUTEROL SULFATE 3 ML: 2.5; .5 SOLUTION RESPIRATORY (INHALATION) at 23:24

## 2020-01-01 RX ADMIN — SODIUM CHLORIDE, POTASSIUM CHLORIDE, SODIUM LACTATE AND CALCIUM CHLORIDE 1000 ML: 600; 310; 30; 20 INJECTION, SOLUTION INTRAVENOUS at 12:52

## 2020-01-01 RX ADMIN — CEFAZOLIN 1 G: 330 INJECTION, POWDER, FOR SOLUTION INTRAMUSCULAR; INTRAVENOUS at 13:56

## 2020-01-01 RX ADMIN — DEXMEDETOMIDINE HYDROCHLORIDE 0.2 MCG/KG/HR: 100 INJECTION, SOLUTION INTRAVENOUS at 17:06

## 2020-01-01 RX ADMIN — LIDOCAINE HYDROCHLORIDE 100 MG: 20 INJECTION, SOLUTION INTRAVENOUS at 11:25

## 2020-01-01 RX ADMIN — METOPROLOL TARTRATE 25 MG: 25 TABLET, FILM COATED ORAL at 21:15

## 2020-01-01 RX ADMIN — IPRATROPIUM BROMIDE AND ALBUTEROL SULFATE 3 ML: 2.5; .5 SOLUTION RESPIRATORY (INHALATION) at 23:29

## 2020-01-01 RX ADMIN — FAMOTIDINE 20 MG: 10 INJECTION INTRAVENOUS at 08:25

## 2020-01-01 RX ADMIN — IPRATROPIUM BROMIDE AND ALBUTEROL SULFATE 3 ML: 2.5; .5 SOLUTION RESPIRATORY (INHALATION) at 10:39

## 2020-01-01 RX ADMIN — SODIUM CHLORIDE, PRESERVATIVE FREE 10 ML: 5 INJECTION INTRAVENOUS at 10:22

## 2020-01-01 RX ADMIN — DEXMEDETOMIDINE HYDROCHLORIDE 0.2 MCG/KG/HR: 100 INJECTION, SOLUTION INTRAVENOUS at 10:34

## 2020-01-01 RX ADMIN — IPRATROPIUM BROMIDE AND ALBUTEROL SULFATE 3 ML: 2.5; .5 SOLUTION RESPIRATORY (INHALATION) at 19:40

## 2020-01-01 RX ADMIN — IPRATROPIUM BROMIDE AND ALBUTEROL SULFATE 3 ML: 2.5; .5 SOLUTION RESPIRATORY (INHALATION) at 03:14

## 2020-01-01 RX ADMIN — VANCOMYCIN HYDROCHLORIDE 750 MG: 10 INJECTION, POWDER, LYOPHILIZED, FOR SOLUTION INTRAVENOUS at 05:50

## 2020-01-01 RX ADMIN — SODIUM CHLORIDE, PRESERVATIVE FREE 10 ML: 5 INJECTION INTRAVENOUS at 20:14

## 2020-01-01 RX ADMIN — CEFEPIME 2 G: 2 INJECTION, POWDER, FOR SOLUTION INTRAVENOUS at 17:26

## 2020-01-01 RX ADMIN — FAMOTIDINE 20 MG: 10 INJECTION INTRAVENOUS at 08:56

## 2020-01-01 RX ADMIN — SODIUM CHLORIDE, PRESERVATIVE FREE 10 ML: 5 INJECTION INTRAVENOUS at 21:19

## 2020-01-01 RX ADMIN — IPRATROPIUM BROMIDE AND ALBUTEROL SULFATE 3 ML: 2.5; .5 SOLUTION RESPIRATORY (INHALATION) at 03:07

## 2020-01-01 RX ADMIN — ONDANSETRON HYDROCHLORIDE 4 MG: 2 SOLUTION INTRAMUSCULAR; INTRAVENOUS at 14:27

## 2020-01-01 RX ADMIN — PROPOFOL 120 MG: 10 INJECTION, EMULSION INTRAVENOUS at 13:50

## 2020-01-01 RX ADMIN — SODIUM CHLORIDE, PRESERVATIVE FREE 10 ML: 5 INJECTION INTRAVENOUS at 20:35

## 2020-01-01 RX ADMIN — IPRATROPIUM BROMIDE AND ALBUTEROL SULFATE 3 ML: 2.5; .5 SOLUTION RESPIRATORY (INHALATION) at 10:44

## 2020-01-01 RX ADMIN — IPRATROPIUM BROMIDE AND ALBUTEROL SULFATE 3 ML: 2.5; .5 SOLUTION RESPIRATORY (INHALATION) at 07:11

## 2020-01-01 RX ADMIN — POTASSIUM PHOSPHATE, MONOBASIC AND POTASSIUM PHOSPHATE, DIBASIC 30 MMOL: 224; 236 INJECTION, SOLUTION, CONCENTRATE INTRAVENOUS at 05:40

## 2020-01-01 RX ADMIN — BUDESONIDE 0.5 MG: 0.5 INHALANT RESPIRATORY (INHALATION) at 20:25

## 2020-01-01 RX ADMIN — IPRATROPIUM BROMIDE AND ALBUTEROL SULFATE 3 ML: 2.5; .5 SOLUTION RESPIRATORY (INHALATION) at 07:32

## 2020-01-01 RX ADMIN — CHLORHEXIDINE GLUCONATE 0.12% ORAL RINSE 15 ML: 1.2 LIQUID ORAL at 08:55

## 2020-01-01 RX ADMIN — FENTANYL CITRATE 100 MCG: 50 INJECTION, SOLUTION INTRAMUSCULAR; INTRAVENOUS at 11:25

## 2020-01-01 RX ADMIN — FAMOTIDINE 20 MG: 10 INJECTION INTRAVENOUS at 09:32

## 2020-01-01 RX ADMIN — IPRATROPIUM BROMIDE AND ALBUTEROL SULFATE 3 ML: 2.5; .5 SOLUTION RESPIRATORY (INHALATION) at 14:42

## 2020-01-01 RX ADMIN — SODIUM CHLORIDE, POTASSIUM CHLORIDE, SODIUM LACTATE AND CALCIUM CHLORIDE 125 ML/HR: 600; 310; 30; 20 INJECTION, SOLUTION INTRAVENOUS at 11:20

## 2020-01-01 RX ADMIN — VANCOMYCIN HYDROCHLORIDE 1250 MG: 10 INJECTION, POWDER, LYOPHILIZED, FOR SOLUTION INTRAVENOUS at 06:41

## 2020-01-01 RX ADMIN — VASOPRESSIN 0.5 ML: 20 INJECTION INTRAVENOUS at 14:06

## 2020-01-01 RX ADMIN — BUDESONIDE 0.5 MG: 0.5 INHALANT RESPIRATORY (INHALATION) at 19:27

## 2020-01-01 RX ADMIN — IPRATROPIUM BROMIDE AND ALBUTEROL SULFATE 3 ML: 2.5; .5 SOLUTION RESPIRATORY (INHALATION) at 23:22

## 2020-01-01 RX ADMIN — CEFTRIAXONE SODIUM 1 G: 1 INJECTION, POWDER, FOR SOLUTION INTRAMUSCULAR; INTRAVENOUS at 10:10

## 2020-01-01 RX ADMIN — FAMOTIDINE 20 MG: 10 INJECTION INTRAVENOUS at 10:42

## 2020-01-01 RX ADMIN — FAMOTIDINE 20 MG: 10 INJECTION INTRAVENOUS at 21:32

## 2020-01-01 RX ADMIN — PREDNISONE 20 MG: 20 TABLET ORAL at 13:46

## 2020-01-01 RX ADMIN — IPRATROPIUM BROMIDE AND ALBUTEROL SULFATE 3 ML: 2.5; .5 SOLUTION RESPIRATORY (INHALATION) at 04:26

## 2020-01-01 RX ADMIN — ROCURONIUM BROMIDE 50 MG: 50 INJECTION, SOLUTION INTRAVENOUS at 16:44

## 2020-01-01 RX ADMIN — IPRATROPIUM BROMIDE AND ALBUTEROL SULFATE 3 ML: 2.5; .5 SOLUTION RESPIRATORY (INHALATION) at 07:36

## 2020-01-01 RX ADMIN — SODIUM CHLORIDE, PRESERVATIVE FREE 10 ML: 5 INJECTION INTRAVENOUS at 08:25

## 2020-01-01 RX ADMIN — PHENYLEPHRINE HYDROCHLORIDE 0.5 MCG/KG/MIN: 10 INJECTION INTRAVENOUS at 13:40

## 2020-01-01 RX ADMIN — VASOPRESSIN 0.5 ML: 20 INJECTION INTRAVENOUS at 14:03

## 2020-01-01 RX ADMIN — PROPOFOL 5 MCG/KG/MIN: 10 INJECTION, EMULSION INTRAVENOUS at 20:02

## 2020-01-01 RX ADMIN — DEXMEDETOMIDINE HYDROCHLORIDE 0.2 MCG/KG/HR: 100 INJECTION, SOLUTION INTRAVENOUS at 00:05

## 2020-01-01 RX ADMIN — LORAZEPAM 1 MG: 2 INJECTION INTRAMUSCULAR; INTRAVENOUS at 00:00

## 2020-01-01 RX ADMIN — FAMOTIDINE 20 MG: 10 INJECTION INTRAVENOUS at 10:11

## 2020-01-01 RX ADMIN — ROCURONIUM BROMIDE 5 MG: 10 INJECTION INTRAVENOUS at 13:50

## 2020-01-01 RX ADMIN — SODIUM CHLORIDE, POTASSIUM CHLORIDE, SODIUM LACTATE AND CALCIUM CHLORIDE 125 ML/HR: 600; 310; 30; 20 INJECTION, SOLUTION INTRAVENOUS at 22:08

## 2020-01-01 RX ADMIN — EPHEDRINE SULFATE 10 MG: 50 INJECTION INTRAVENOUS at 14:29

## 2020-01-01 RX ADMIN — MEROPENEM 1 G: 1 INJECTION, POWDER, FOR SOLUTION INTRAVENOUS at 14:57

## 2020-01-01 RX ADMIN — VANCOMYCIN HYDROCHLORIDE 750 MG: 10 INJECTION, POWDER, LYOPHILIZED, FOR SOLUTION INTRAVENOUS at 13:55

## 2020-01-01 RX ADMIN — CHLORHEXIDINE GLUCONATE 0.12% ORAL RINSE 15 ML: 1.2 LIQUID ORAL at 08:34

## 2020-01-01 RX ADMIN — CEFEPIME 2 G: 2 INJECTION, POWDER, FOR SOLUTION INTRAVENOUS at 16:41

## 2020-01-01 RX ADMIN — ACETAMINOPHEN 1000 MG: 500 TABLET, FILM COATED ORAL at 13:13

## 2020-01-01 RX ADMIN — METOPROLOL TARTRATE 25 MG: 25 TABLET, FILM COATED ORAL at 08:34

## 2020-01-01 RX ADMIN — ROCURONIUM BROMIDE 40 MG: 10 INJECTION INTRAVENOUS at 11:26

## 2020-01-01 RX ADMIN — CHLORHEXIDINE GLUCONATE 0.12% ORAL RINSE 15 ML: 1.2 LIQUID ORAL at 08:25

## 2020-01-01 RX ADMIN — BUDESONIDE 0.5 MG: 0.5 INHALANT RESPIRATORY (INHALATION) at 07:32

## 2020-01-01 RX ADMIN — THIAMINE HCL TAB 100 MG 100 MG: 100 TAB at 17:26

## 2020-01-01 RX ADMIN — SODIUM CHLORIDE, PRESERVATIVE FREE 10 ML: 5 INJECTION INTRAVENOUS at 21:07

## 2020-01-01 RX ADMIN — CHLORHEXIDINE GLUCONATE 0.12% ORAL RINSE 15 ML: 1.2 LIQUID ORAL at 10:10

## 2020-01-01 RX ADMIN — FUROSEMIDE 40 MG: 10 INJECTION, SOLUTION INTRAMUSCULAR; INTRAVENOUS at 23:01

## 2020-01-01 RX ADMIN — PREDNISONE 20 MG: 20 TABLET ORAL at 06:45

## 2020-01-01 RX ADMIN — SODIUM CHLORIDE, PRESERVATIVE FREE 10 ML: 5 INJECTION INTRAVENOUS at 08:34

## 2020-01-01 RX ADMIN — SUCCINYLCHOLINE CHLORIDE 100 MG: 20 INJECTION, SOLUTION INTRAMUSCULAR; INTRAVENOUS at 13:50

## 2020-01-01 RX ADMIN — SUFENTANIL CITRATE 10 MCG: 50 INJECTION EPIDURAL; INTRAVENOUS at 14:37

## 2020-01-01 RX ADMIN — VASOPRESSIN 0.04 UNITS/HR: 20 INJECTION INTRAVENOUS at 14:49

## 2020-01-01 RX ADMIN — METOPROLOL TARTRATE 25 MG: 25 TABLET, FILM COATED ORAL at 09:34

## 2020-01-01 RX ADMIN — FUROSEMIDE 40 MG: 10 INJECTION, SOLUTION INTRAMUSCULAR; INTRAVENOUS at 10:36

## 2020-01-01 RX ADMIN — PROPOFOL 100 MG: 10 INJECTION, EMULSION INTRAVENOUS at 11:26

## 2020-01-01 RX ADMIN — LORAZEPAM 1 MG: 2 INJECTION INTRAMUSCULAR; INTRAVENOUS at 17:32

## 2020-01-01 RX ADMIN — VANCOMYCIN HYDROCHLORIDE 750 MG: 10 INJECTION, POWDER, LYOPHILIZED, FOR SOLUTION INTRAVENOUS at 17:27

## 2020-01-01 RX ADMIN — FAMOTIDINE 20 MG: 10 INJECTION INTRAVENOUS at 22:03

## 2020-01-01 RX ADMIN — CEFEPIME 2 G: 2 INJECTION, POWDER, FOR SOLUTION INTRAVENOUS at 17:27

## 2020-01-01 RX ADMIN — IPRATROPIUM BROMIDE AND ALBUTEROL SULFATE 3 ML: 2.5; .5 SOLUTION RESPIRATORY (INHALATION) at 14:12

## 2020-01-01 RX ADMIN — DEXAMETHASONE SODIUM PHOSPHATE 4 MG: 4 INJECTION, SOLUTION INTRAMUSCULAR; INTRAVENOUS at 13:56

## 2020-01-01 RX ADMIN — LORAZEPAM 1 MG: 2 INJECTION INTRAMUSCULAR; INTRAVENOUS at 02:50

## 2020-01-01 RX ADMIN — CEFEPIME 2 G: 2 INJECTION, POWDER, FOR SOLUTION INTRAVENOUS at 08:30

## 2020-01-01 RX ADMIN — BUDESONIDE 0.5 MG: 0.5 INHALANT RESPIRATORY (INHALATION) at 07:27

## 2020-01-01 RX ADMIN — FAMOTIDINE 20 MG: 10 INJECTION INTRAVENOUS at 20:14

## 2020-01-01 RX ADMIN — TAZOBACTAM SODIUM AND PIPERACILLIN SODIUM 4.5 G: 500; 4 INJECTION, SOLUTION INTRAVENOUS at 12:10

## 2020-01-01 RX ADMIN — DEXTROSE MONOHYDRATE 50 ML/HR: 50 INJECTION, SOLUTION INTRAVENOUS at 10:22

## 2020-01-01 RX ADMIN — TECHNETIUM TC 99M SESTAMIBI 1 DOSE: 1 INJECTION INTRAVENOUS at 11:58

## 2020-01-01 RX ADMIN — METOPROLOL TARTRATE 25 MG: 25 TABLET, FILM COATED ORAL at 21:32

## 2020-01-01 RX ADMIN — LORAZEPAM 0.5 MG: 2 INJECTION INTRAMUSCULAR; INTRAVENOUS at 17:26

## 2020-01-01 RX ADMIN — PREDNISONE 20 MG: 20 TABLET ORAL at 21:07

## 2020-01-01 RX ADMIN — CEFEPIME 2 G: 2 INJECTION, POWDER, FOR SOLUTION INTRAVENOUS at 02:43

## 2020-01-01 RX ADMIN — GADOBENATE DIMEGLUMINE 13 ML: 529 INJECTION, SOLUTION INTRAVENOUS at 16:16

## 2020-01-01 RX ADMIN — FENTANYL CITRATE 0.7 MCG/KG/HR: 50 INJECTION INTRAMUSCULAR; INTRAVENOUS at 05:09

## 2020-01-01 RX ADMIN — IPRATROPIUM BROMIDE AND ALBUTEROL SULFATE 3 ML: 2.5; .5 SOLUTION RESPIRATORY (INHALATION) at 15:49

## 2020-01-01 RX ADMIN — SODIUM CHLORIDE 1000 ML: 9 INJECTION, SOLUTION INTRAVENOUS at 17:12

## 2020-01-01 RX ADMIN — CHLORHEXIDINE GLUCONATE 0.12% ORAL RINSE 15 ML: 1.2 LIQUID ORAL at 22:46

## 2020-01-01 RX ADMIN — TECHNETIUM TC 99M SESTAMIBI 1 DOSE: 1 INJECTION INTRAVENOUS at 09:50

## 2020-01-01 RX ADMIN — FOLIC ACID 1 MG: 1 TABLET ORAL at 10:14

## 2020-01-01 RX ADMIN — SODIUM CHLORIDE, PRESERVATIVE FREE 10 ML: 5 INJECTION INTRAVENOUS at 08:56

## 2020-01-01 RX ADMIN — DEXAMETHASONE SODIUM PHOSPHATE 4 MG: 4 INJECTION, SOLUTION INTRAMUSCULAR; INTRAVENOUS at 13:13

## 2020-01-01 RX ADMIN — SODIUM CHLORIDE, POTASSIUM CHLORIDE, SODIUM LACTATE AND CALCIUM CHLORIDE 125 ML/HR: 600; 310; 30; 20 INJECTION, SOLUTION INTRAVENOUS at 05:23

## 2020-01-01 RX ADMIN — SODIUM CHLORIDE 1000 ML: 9 INJECTION, SOLUTION INTRAVENOUS at 16:21

## 2020-01-01 RX ADMIN — REGADENOSON 0.4 MG: 0.08 INJECTION, SOLUTION INTRAVENOUS at 11:57

## 2020-01-01 RX ADMIN — Medication 1 PATCH: at 09:32

## 2020-01-01 RX ADMIN — VANCOMYCIN HYDROCHLORIDE 1250 MG: 10 INJECTION, POWDER, LYOPHILIZED, FOR SOLUTION INTRAVENOUS at 17:05

## 2020-01-01 RX ADMIN — IPRATROPIUM BROMIDE AND ALBUTEROL SULFATE 3 ML: 2.5; .5 SOLUTION RESPIRATORY (INHALATION) at 19:27

## 2020-01-01 RX ADMIN — CHLORHEXIDINE GLUCONATE 0.12% ORAL RINSE 15 ML: 1.2 LIQUID ORAL at 09:32

## 2020-01-01 RX ADMIN — FOLIC ACID 1 MG: 1 TABLET ORAL at 10:36

## 2020-01-01 RX ADMIN — VASOPRESSIN 1 ML: 20 INJECTION INTRAVENOUS at 14:18

## 2020-01-01 RX ADMIN — ACETAMINOPHEN 1000 MG: 500 TABLET, FILM COATED ORAL at 10:42

## 2020-01-01 RX ADMIN — LIDOCAINE HYDROCHLORIDE 10 ML: 20 SOLUTION ORAL; TOPICAL at 09:33

## 2020-01-01 RX ADMIN — CEFEPIME 2 G: 2 INJECTION, POWDER, FOR SOLUTION INTRAVENOUS at 00:59

## 2020-01-01 RX ADMIN — IPRATROPIUM BROMIDE AND ALBUTEROL SULFATE 3 ML: 2.5; .5 SOLUTION RESPIRATORY (INHALATION) at 00:17

## 2020-01-01 RX ADMIN — Medication 1 PATCH: at 10:12

## 2020-01-01 RX ADMIN — IPRATROPIUM BROMIDE AND ALBUTEROL SULFATE 3 ML: 2.5; .5 SOLUTION RESPIRATORY (INHALATION) at 19:46

## 2020-01-01 RX ADMIN — Medication 1 MG: at 15:02

## 2020-01-01 RX ADMIN — CEFEPIME 2 G: 2 INJECTION, POWDER, FOR SOLUTION INTRAVENOUS at 08:34

## 2020-01-01 RX ADMIN — BUDESONIDE 0.5 MG: 0.5 INHALANT RESPIRATORY (INHALATION) at 19:46

## 2020-01-01 RX ADMIN — FAMOTIDINE 20 MG: 10 INJECTION INTRAVENOUS at 21:04

## 2020-01-01 RX ADMIN — VANCOMYCIN HYDROCHLORIDE 1250 MG: 10 INJECTION, POWDER, LYOPHILIZED, FOR SOLUTION INTRAVENOUS at 17:59

## 2020-01-01 RX ADMIN — Medication 4 MG: at 13:28

## 2020-01-01 RX ADMIN — BUDESONIDE 0.5 MG: 0.5 INHALANT RESPIRATORY (INHALATION) at 07:36

## 2020-01-01 RX ADMIN — BUDESONIDE 0.5 MG: 0.5 INHALANT RESPIRATORY (INHALATION) at 21:19

## 2020-01-01 RX ADMIN — ETOMIDATE 20 MG: 20 INJECTION, SOLUTION INTRAVENOUS at 16:44

## 2020-01-01 RX ADMIN — VASOPRESSIN 1 ML: 20 INJECTION INTRAVENOUS at 14:23

## 2020-01-01 RX ADMIN — FAMOTIDINE 20 MG: 10 INJECTION INTRAVENOUS at 20:34

## 2020-01-01 RX ADMIN — SODIUM CHLORIDE, POTASSIUM CHLORIDE, SODIUM LACTATE AND CALCIUM CHLORIDE: 600; 310; 30; 20 INJECTION, SOLUTION INTRAVENOUS at 14:43

## 2020-01-01 RX ADMIN — SODIUM CHLORIDE, POTASSIUM CHLORIDE, SODIUM LACTATE AND CALCIUM CHLORIDE 100 ML/HR: 600; 310; 30; 20 INJECTION, SOLUTION INTRAVENOUS at 11:14

## 2020-01-01 RX ADMIN — LORAZEPAM 0.5 MG: 2 INJECTION INTRAMUSCULAR; INTRAVENOUS at 23:23

## 2020-01-01 RX ADMIN — Medication 1 PATCH: at 09:04

## 2020-01-01 RX ADMIN — Medication 160 MCG: at 14:08

## 2020-01-01 RX ADMIN — IPRATROPIUM BROMIDE AND ALBUTEROL SULFATE 3 ML: 2.5; .5 SOLUTION RESPIRATORY (INHALATION) at 23:18

## 2020-01-01 RX ADMIN — CHLORHEXIDINE GLUCONATE 0.12% ORAL RINSE 15 ML: 1.2 LIQUID ORAL at 20:14

## 2020-01-01 RX ADMIN — DEXTROSE MONOHYDRATE 25 ML: 25 INJECTION, SOLUTION INTRAVENOUS at 15:03

## 2020-01-01 RX ADMIN — FENTANYL CITRATE 0.7 MCG/KG/HR: 50 INJECTION INTRAMUSCULAR; INTRAVENOUS at 22:59

## 2020-01-01 RX ADMIN — POTASSIUM PHOSPHATE, MONOBASIC AND POTASSIUM PHOSPHATE, DIBASIC 15 MMOL: 224; 236 INJECTION, SOLUTION, CONCENTRATE INTRAVENOUS at 04:30

## 2020-01-01 RX ADMIN — PERFLUTREN 8.48 MG: 6.52 INJECTION, SUSPENSION INTRAVENOUS at 09:34

## 2020-01-01 RX ADMIN — Medication 1 PATCH: at 08:34

## 2020-01-01 RX ADMIN — METOPROLOL TARTRATE 25 MG: 25 TABLET, FILM COATED ORAL at 08:55

## 2020-01-01 RX ADMIN — LORAZEPAM 1 MG: 2 INJECTION INTRAMUSCULAR; INTRAVENOUS at 21:32

## 2020-01-01 RX ADMIN — IPRATROPIUM BROMIDE AND ALBUTEROL SULFATE 3 ML: 2.5; .5 SOLUTION RESPIRATORY (INHALATION) at 20:25

## 2020-01-01 RX ADMIN — Medication 1 PATCH: at 15:35

## 2020-01-01 RX ADMIN — BUDESONIDE 0.5 MG: 0.5 INHALANT RESPIRATORY (INHALATION) at 07:11

## 2020-01-01 RX ADMIN — POTASSIUM CHLORIDE 20 MEQ: 20 SOLUTION ORAL at 11:52

## 2020-01-01 RX ADMIN — Medication 2 G: at 11:35

## 2020-01-01 RX ADMIN — GLYCOPYRROLATE 0.4 MG: 0.2 INJECTION, SOLUTION INTRAMUSCULAR; INTRAVENOUS at 13:27

## 2020-01-01 RX ADMIN — CEFEPIME 2 G: 2 INJECTION, POWDER, FOR SOLUTION INTRAVENOUS at 17:23

## 2020-01-01 RX ADMIN — IPRATROPIUM BROMIDE AND ALBUTEROL SULFATE 3 ML: 2.5; .5 SOLUTION RESPIRATORY (INHALATION) at 06:51

## 2020-01-01 RX ADMIN — CAFFEINE CITRATE 60 MG: 20 INJECTION, SOLUTION INTRAVENOUS at 12:07

## 2020-01-01 RX ADMIN — METOPROLOL TARTRATE 25 MG: 25 TABLET, FILM COATED ORAL at 20:34

## 2020-01-01 RX ADMIN — FENTANYL CITRATE 50 MCG: 50 INJECTION, SOLUTION INTRAMUSCULAR; INTRAVENOUS at 13:50

## 2020-01-01 RX ADMIN — Medication 80 MCG: at 14:20

## 2020-01-01 RX ADMIN — POTASSIUM CHLORIDE 20 MEQ: 20 SOLUTION ORAL at 17:01

## 2020-01-01 RX ADMIN — LIDOCAINE HYDROCHLORIDE ANHYDROUS 1 ML: 10 INJECTION, SOLUTION INFILTRATION at 13:02

## 2020-01-01 RX ADMIN — IPRATROPIUM BROMIDE AND ALBUTEROL SULFATE 3 ML: 2.5; .5 SOLUTION RESPIRATORY (INHALATION) at 10:20

## 2020-01-01 RX ADMIN — VASOPRESSIN 1 ML: 20 INJECTION INTRAVENOUS at 14:32

## 2020-01-01 RX ADMIN — IPRATROPIUM BROMIDE AND ALBUTEROL SULFATE 3 ML: 2.5; .5 SOLUTION RESPIRATORY (INHALATION) at 10:24

## 2020-01-01 RX ADMIN — NOREPINEPHRINE BITARTRATE 0.02 MCG/KG/MIN: 1 INJECTION, SOLUTION, CONCENTRATE INTRAVENOUS at 15:07

## 2020-01-01 RX ADMIN — IPRATROPIUM BROMIDE AND ALBUTEROL SULFATE 3 ML: 2.5; .5 SOLUTION RESPIRATORY (INHALATION) at 14:54

## 2020-01-01 RX ADMIN — IPRATROPIUM BROMIDE AND ALBUTEROL SULFATE 3 ML: 2.5; .5 SOLUTION RESPIRATORY (INHALATION) at 03:26

## 2020-01-01 RX ADMIN — SODIUM CHLORIDE 500 ML: 9 INJECTION, SOLUTION INTRAVENOUS at 19:53

## 2020-01-01 RX ADMIN — SODIUM CHLORIDE 1000 ML: 9 INJECTION, SOLUTION INTRAVENOUS at 16:59

## 2020-01-01 RX ADMIN — CHLORHEXIDINE GLUCONATE 0.12% ORAL RINSE 15 ML: 1.2 LIQUID ORAL at 21:20

## 2020-01-01 RX ADMIN — MIDODRINE HYDROCHLORIDE 5 MG: 2.5 TABLET ORAL at 10:36

## 2020-01-01 RX ADMIN — CEFTRIAXONE SODIUM 1 G: 1 INJECTION, POWDER, FOR SOLUTION INTRAMUSCULAR; INTRAVENOUS at 11:52

## 2020-01-01 RX ADMIN — FAMOTIDINE 20 MG: 10 INJECTION INTRAVENOUS at 08:34

## 2020-01-01 RX ADMIN — LORAZEPAM 1 MG: 2 INJECTION INTRAMUSCULAR; INTRAVENOUS at 04:00

## 2020-01-01 RX ADMIN — VANCOMYCIN HYDROCHLORIDE 750 MG: 10 INJECTION, POWDER, LYOPHILIZED, FOR SOLUTION INTRAVENOUS at 17:26

## 2020-01-01 RX ADMIN — SODIUM CHLORIDE, PRESERVATIVE FREE 10 ML: 5 INJECTION INTRAVENOUS at 22:03

## 2020-01-01 RX ADMIN — SODIUM CHLORIDE, PRESERVATIVE FREE 10 ML: 5 INJECTION INTRAVENOUS at 09:32

## 2020-01-01 RX ADMIN — PROPOFOL 50 MG: 10 INJECTION, EMULSION INTRAVENOUS at 11:25

## 2020-01-01 RX ADMIN — IPRATROPIUM BROMIDE AND ALBUTEROL SULFATE 3 ML: 2.5; .5 SOLUTION RESPIRATORY (INHALATION) at 11:29

## 2020-01-01 RX ADMIN — LORAZEPAM 1 MG: 2 INJECTION INTRAMUSCULAR; INTRAVENOUS at 05:06

## 2020-01-01 RX ADMIN — IPRATROPIUM BROMIDE AND ALBUTEROL SULFATE 3 ML: 2.5; .5 SOLUTION RESPIRATORY (INHALATION) at 03:09

## 2020-01-01 RX ADMIN — CEFEPIME 2 G: 2 INJECTION, POWDER, FOR SOLUTION INTRAVENOUS at 08:55

## 2020-01-01 RX ADMIN — FENTANYL CITRATE 0.7 MCG/KG/HR: 50 INJECTION INTRAMUSCULAR; INTRAVENOUS at 04:36

## 2020-01-01 RX ADMIN — Medication 50 MEQ: at 10:21

## 2020-05-21 NOTE — PROGRESS NOTES
Preprocedure diagnosis: Clinically suspicious for basal cell carcinoma of the nasal tip    Post procedure diagnosis: Same    Procedure: Punch biopsy    Details:  Patient consent was obtained.  The skin was cleansed with alcohol.  The skin was infiltrated with 1 mL of 1% lidocaine with 1-100,000 epinephrine.  After approximately 10 minutes, a 3 millimeter punch biopsy was taken by placing circular motion on the biopsy tool, the skin was elevated and clipped with iris scissors.  The specimen was sent in formalin.  The skin was closed using a simple 5-0 nylon suture.  Antibiotic ointment was placed over the biopsy site.  The patient tolerated the procedure with minimal discomfort.    Jovan Trejo MD  05/21/20  11:58    Preprocedure diagnosis: Clinically suspicious for basal cell carcinoma of the nasal dorsum    Post procedure diagnosis: Same    Procedure: Punch biopsy    Details:  Patient consent was obtained.  The skin was cleansed with alcohol.  The skin was infiltrated with 1 mL of 1% lidocaine with 1-100,000 epinephrine.  After approximately 10 minutes, a 3 millimeter punch biopsy was taken by placing circular motion on the biopsy tool, the skin was elevated and clipped with iris scissors.  The specimen was sent in formalin.  The skin was closed using a simple 5-0 nylon suture.  Antibiotic ointment was placed over the biopsy site.  The patient tolerated the procedure with minimal discomfort.    Jovan Trejo MD  05/21/20  11:58

## 2020-05-21 NOTE — PROGRESS NOTES
PRIMARY CARE PROVIDER: Jai Baum MD  REFERRING PROVIDER: No ref. provider found    Chief Complaint   Patient presents with   • Skin Lesion     nasal tip lesion       Subjective   History of Present Illness:  Muriel Hernandez is a  59 y.o. female who presents for surgical consultation regarding a lesion of the nasal tip:     Location: Nasal tip  Quality: Nonhealing  Severity: Moderate  Duration: 1.5 years  Timing: constant  Modifying Factors: None  Associated Signs & Symptoms: None    She also has multiple lesions of the nasal dorsum which have waxed and waned for months.  She fell 3 weeks ago, scratching her nasal dorsum, a few millimeters superior to the lesion.    Review of Systems:  Review of Systems   Constitutional: Negative for chills, fever and unexpected weight change.   HENT: Positive for congestion.    Respiratory: Positive for shortness of breath.    Cardiovascular: Negative for chest pain.   Musculoskeletal: Positive for gait problem and joint swelling. Negative for back pain and neck pain.   Skin: Positive for wound. Negative for color change.   Neurological: Positive for facial asymmetry.   Hematological: Negative for adenopathy. Does not bruise/bleed easily.   All other systems reviewed and are negative.      Past History:  Past Medical History:   Diagnosis Date   • Arthritis    • Asthma    • Cancer (CMS/HCC)     skin   • COPD (chronic obstructive pulmonary disease) (CMS/HCC)    • Hypertension    • IBS (irritable bowel syndrome)    • Neoplasm of uncertain behavior     nasal tip   • TIA (transient ischemic attack)    • UTI (urinary tract infection)      Past Surgical History:   Procedure Laterality Date   • BREAST SURGERY     • COLONOSCOPY     • HYSTERECTOMY     • KNEE SURGERY       History reviewed. No pertinent family history.  Social History     Tobacco Use   • Smoking status: Current Every Day Smoker   • Smokeless tobacco: Never Used   Substance Use Topics   • Alcohol use: Not on  file   • Drug use: Not on file     Allergies:  Sulfa antibiotics    Current Outpatient Medications:   •  albuterol sulfate  (90 Base) MCG/ACT inhaler, Inhale 2 puffs Every 6 (Six) Hours As Needed for Wheezing., Disp: , Rfl:   •  ALPRAZolam (XANAX) 0.5 MG tablet, Take 0.5 mg by mouth 3 (Three) Times a Day As Needed for Anxiety., Disp: , Rfl:   •  dicyclomine (BENTYL) 10 MG capsule, Take 10 mg by mouth 4 (Four) Times a Day Before Meals & at Bedtime., Disp: , Rfl:   •  estradiol (ESTRACE) 1 MG tablet, , Disp: , Rfl:   •  HYDROcodone-acetaminophen (NORCO)  MG per tablet, Take 2 tablets by mouth 4 (Four) Times a Day As Needed for Moderate Pain ., Disp: , Rfl:   •  ipratropium-albuterol (DUO-NEB) 0.5-2.5 mg/3 ml nebulizer, Take 3 mL by nebulization 4 (Four) Times a Day., Disp: 360 mL, Rfl: 2  •  losartan-hydrochlorothiazide (HYZAAR) 100-25 MG per tablet, Take 1 tablet by mouth 2 (Two) Times a Day., Disp: , Rfl:   •  metoprolol succinate XL (TOPROL-XL) 50 MG 24 hr tablet, Take 100 mg by mouth 2 (Two) Times a Day., Disp: , Rfl:   •  montelukast (SINGULAIR) 10 MG tablet, Take 10 mg by mouth Daily., Disp: , Rfl:   •  omeprazole (priLOSEC) 40 MG capsule, , Disp: , Rfl:     Objective     Vital Signs:  Temp:  [98 °F (36.7 °C)] 98 °F (36.7 °C)  Heart Rate:  [80] 80  Resp:  [20] 20  BP: (138)/(76) 138/76    Physical Exam:  Physical Exam   Constitutional: She is oriented to person, place, and time. She appears well-developed and well-nourished. She is cooperative. No distress.   HENT:   Head: Normocephalic and atraumatic.   Right Ear: External ear normal.   Left Ear: External ear normal.   Nose: Septal deviation (leftward) present.       Eyes: Pupils are equal, round, and reactive to light. Conjunctivae and EOM are normal. Right eye exhibits no discharge. Left eye exhibits no discharge. No scleral icterus.   Neck: Normal range of motion. Neck supple. No JVD present. No tracheal deviation present. No thyromegaly  present.   Cardiovascular: Normal rate, regular rhythm and normal heart sounds.   Pulmonary/Chest: Effort normal. No stridor.   Nasal canula O2   Musculoskeletal: Normal range of motion. She exhibits no edema or deformity.   Lymphadenopathy:     She has no cervical adenopathy.   Neurological: She is alert and oriented to person, place, and time. She has normal strength. No cranial nerve deficit. Coordination normal.   Skin: Skin is warm and dry. No rash noted. She is not diaphoretic. No erythema. No pallor.        Psychiatric: She has a normal mood and affect. Her speech is normal and behavior is normal. Judgment and thought content normal. Cognition and memory are normal.   Nursing note and vitals reviewed.      Operative plan:        Assessment   Assessment:  1. Neoplasm of uncertain behavior of skin    2. Chronic obstructive pulmonary disease, unspecified COPD type (CMS/HCC)    3. Tobacco abuse    4. Lipoma of left upper extremity        Plan   Plan:  I performed 2 punch biopsies today in the office.  I am concerned that this is an area of multiple basal cell carcinomas that would require a paramedian forehead flap reconstruction.  We will see her back in 1 week to go over the pathology results and subsequent plan.    Discussion of skin lesion. Discussed risks, benefits, alternatives, and possible complications of excision of the skin lesion with reconstruction utilizing local tissue rearrangement, full-thickness skin grafting, or local interpolated flaps. Risks include, but are not limited too: bleeding, infection, hematoma, recurrence, need for additional procedures, flap failure, cosmetic deformity. Patient understands risks and would like to proceed with surgery.     My findings and recommendations were discussed and questions were answered.     QUALITY MEASURES      Tobacco Use: Screening and Cessation Intervention  Social History    Tobacco Use      Smoking status: Current Every Day Smoker      Smokeless  tobacco: Never Used    Irilene Sunshine Hernandez  reports that she has been smoking. She has never used smokeless tobacco.. I have educated her on the risk of diseases from using tobacco products such as cancer, COPD and heart diease.     I advised her to quit and she is not willing to quit.    I spent 3.5  minutes counseling the patient.      Jovan Trejo MD  05/21/20  11:55

## 2020-05-28 NOTE — PROGRESS NOTES
PRIMARY CARE PROVIDER: Jai Baum MD  REFERRING PROVIDER: No ref. provider found    CC: Follow-up biopsy results      Subjective   History of Present Illness:  Muriel Hernandez is a  59 y.o. female who presents to discuss biopsy results of her nose.  Prior to the biopsies, the lesion had the following characteristics:     Location: Nasal tip  Quality: Nonhealing  Severity: Moderate  Duration: 1.5 years  Timing: constant  Modifying Factors: None  Associated Signs & Symptoms: None      Review of Systems:  Review of Systems   Constitutional: Negative for chills, fever and unexpected weight change.   HENT: Positive for congestion.    Respiratory: Positive for shortness of breath.    Cardiovascular: Negative for chest pain.   Musculoskeletal: Positive for gait problem, joint swelling and neck pain. Negative for back pain.   Skin: Positive for wound. Negative for color change.   Neurological: Positive for facial asymmetry.   Hematological: Negative for adenopathy. Does not bruise/bleed easily.   All other systems reviewed and are negative.      Past History:  Past Medical History:   Diagnosis Date   • Arthritis    • Asthma    • Cancer (CMS/HCC)     skin   • COPD (chronic obstructive pulmonary disease) (CMS/HCC)    • Hypertension    • IBS (irritable bowel syndrome)    • Neoplasm of uncertain behavior     nasal tip   • TIA (transient ischemic attack)    • UTI (urinary tract infection)      Past Surgical History:   Procedure Laterality Date   • BREAST SURGERY     • COLONOSCOPY     • HYSTERECTOMY     • KNEE SURGERY       History reviewed. No pertinent family history.  Social History     Tobacco Use   • Smoking status: Current Every Day Smoker   • Smokeless tobacco: Never Used   Substance Use Topics   • Alcohol use: Not on file   • Drug use: Not on file     Allergies:  Sulfa antibiotics    Current Outpatient Medications:   •  albuterol sulfate  (90 Base) MCG/ACT inhaler, Inhale 2 puffs Every 6 (Six) Hours  As Needed for Wheezing., Disp: , Rfl:   •  ALPRAZolam (XANAX) 0.5 MG tablet, Take 0.5 mg by mouth 3 (Three) Times a Day As Needed for Anxiety., Disp: , Rfl:   •  dicyclomine (BENTYL) 10 MG capsule, Take 10 mg by mouth 4 (Four) Times a Day Before Meals & at Bedtime., Disp: , Rfl:   •  estradiol (ESTRACE) 1 MG tablet, , Disp: , Rfl:   •  HYDROcodone-acetaminophen (NORCO)  MG per tablet, Take 2 tablets by mouth 4 (Four) Times a Day As Needed for Moderate Pain ., Disp: , Rfl:   •  ipratropium-albuterol (DUO-NEB) 0.5-2.5 mg/3 ml nebulizer, Take 3 mL by nebulization 4 (Four) Times a Day., Disp: 360 mL, Rfl: 2  •  losartan-hydrochlorothiazide (HYZAAR) 100-25 MG per tablet, Take 1 tablet by mouth 2 (Two) Times a Day., Disp: , Rfl:   •  metoprolol succinate XL (TOPROL-XL) 50 MG 24 hr tablet, Take 100 mg by mouth 2 (Two) Times a Day., Disp: , Rfl:   •  montelukast (SINGULAIR) 10 MG tablet, Take 10 mg by mouth Daily., Disp: , Rfl:   •  omeprazole (priLOSEC) 40 MG capsule, , Disp: , Rfl:     Objective     Vital Signs:  Temp:  [98 °F (36.7 °C)] 98 °F (36.7 °C)  Heart Rate:  [78] 78  Resp:  [20] 20  BP: (145)/(80) 145/80    Physical Exam:  Physical Exam   Constitutional: She is oriented to person, place, and time. She appears well-developed and well-nourished. She is cooperative. No distress.   HENT:   Head: Normocephalic and atraumatic.   Right Ear: External ear normal.   Left Ear: External ear normal.   Nose: Septal deviation (leftward) present.       Mouth/Throat: Uvula is midline, oropharynx is clear and moist and mucous membranes are normal.   Eyes: Pupils are equal, round, and reactive to light. Conjunctivae and EOM are normal. Right eye exhibits no discharge. Left eye exhibits no discharge. No scleral icterus.   Neck: Normal range of motion. Neck supple. No JVD present. No tracheal deviation present. No thyromegaly present.   Cardiovascular: Normal rate, regular rhythm and normal heart sounds.   Pulmonary/Chest:  Effort normal. No stridor.   Nasal canula O2   Musculoskeletal: She exhibits no edema or deformity.   Wheelchair   Lymphadenopathy:     She has no cervical adenopathy.   Neurological: She is alert and oriented to person, place, and time. She has normal strength. No cranial nerve deficit. Coordination normal.   Skin: Skin is warm and dry. No rash noted. She is not diaphoretic. No erythema. No pallor.   Psychiatric: She has a normal mood and affect. Her speech is normal and behavior is normal. Judgment and thought content normal. Cognition and memory are normal.   Nursing note and vitals reviewed.    Biopsy results were reviewed, demonstrating a moderate to poorly differentiated squamous cell carcinoma the nasal tip, and actinic keratosis of the nasal dorsum:          Operative plan:        Assessment   Assessment:  1. Chronic obstructive pulmonary disease, unspecified COPD type (CMS/HCC)    2. Tobacco abuse    3. Lipoma of left upper extremity    4. Squamous cell cancer of skin of nose    5. Actinic keratosis        Plan   Plan:  Due to the aggressiveness of the squamous cell carcinoma, I would like to proceed with a CT of the neck.  I discussed the options of  full excision with frozen section analysis and likely forehead flap reconstruction.  We also discussed the option of radiation therapy, which she may require postoperatively if significant perineural invasion is noted.  I will set her up with Dr. Lynch, and then we will discuss her further treatment desires.      Discussion of skin lesion. Discussed risks, benefits, alternatives, and possible complications of excision of the skin lesion with reconstruction utilizing local tissue rearrangement, full-thickness skin grafting, or local interpolated flaps. Risks include, but are not limited too: bleeding, infection, hematoma, recurrence, need for additional procedures, flap failure, cosmetic deformity. Patient understands risks and would like to proceed with  surgery.     My findings and recommendations were discussed and questions were answered.     Jovan Trejo MD  05/28/20  09:42

## 2020-06-08 PROBLEM — F17.200 CURRENT EVERY DAY SMOKER: Status: ACTIVE | Noted: 2020-01-01

## 2020-06-08 PROBLEM — C44.92 SCCA (SQUAMOUS CELL CARCINOMA) OF SKIN: Status: ACTIVE | Noted: 2020-01-01

## 2020-06-10 PROBLEM — R56.9 SEIZURES (HCC): Status: ACTIVE | Noted: 2020-01-01

## 2020-06-10 PROBLEM — R55 SYNCOPE: Status: ACTIVE | Noted: 2020-01-01

## 2020-06-10 PROBLEM — R29.6 FREQUENT FALLS: Status: ACTIVE | Noted: 2020-01-01

## 2020-06-10 PROBLEM — R42 NONSPECIFIC DIZZINESS: Status: ACTIVE | Noted: 2020-01-01

## 2020-06-17 NOTE — PROGRESS NOTES
PRIMARY CARE PROVIDER: Real Alicea MD  REFERRING PROVIDER: No ref. 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      Subjective   History of Present Illness:  Muriel Hernandez is a  60 y.o. 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  She has seen Dr. Lynch, who is concerned with her episodes of passing out.  We had come up with a plan to have her passing out episodes evaluated prior to surgical treatment.  Should these episodes place her at high risk, then we would consider radiation therapy.  Prior to the biopsy, the lesion of the following characteristics:     Location: Nasal tip  Quality: Nonhealing  Severity: Moderate  Duration: 1.5 years  Timing: constant  Modifying Factors: None  Associated Signs & Symptoms: None    We had ordered a CT scan of the neck; this has not been performed.    She also reports frequent episodes of loss of consciousness.  These are occurring more frequently, more so over the last few weeks.  They may last minutes to hours in duration.  They can occur spontaneously, or when sitting up, or moving.  She denies any associated vertigo or aural fullness or tinnitus.  She does report a feeling of being lightheaded.  During 1 of the episodes, she felt her heart fluttering.  Her father suffered from an irregular heart rate.    Review of Systems:  Review of Systems   Constitutional: Negative for chills, fever and unexpected weight change.   HENT: Positive for congestion.    Respiratory: Positive for shortness of breath.    Cardiovascular: Positive for palpitations. Negative for chest pain.   Musculoskeletal: Positive for gait problem, joint swelling and neck pain. Negative for back pain.   Skin: Positive for wound. Negative for color change.   Neurological: Positive for syncope and facial asymmetry.   Hematological: Negative for adenopathy. Does not bruise/bleed easily.   All other  systems reviewed and are negative.      Past History:  Past Medical History:   Diagnosis Date   • Arthritis    • Asthma    • Cancer (CMS/HCC)     skin   • Chronic back pain    • COPD (chronic obstructive pulmonary disease) (CMS/HCC)    • Hypertension    • IBS (irritable bowel syndrome)    • Neoplasm of uncertain behavior     nasal tip   • Skin cancer    • TIA (transient ischemic attack)    • UTI (urinary tract infection)      Past Surgical History:   Procedure Laterality Date   • BREAST SURGERY     • COLONOSCOPY     • HYSTERECTOMY     • KNEE SURGERY       Family History   Problem Relation Age of Onset   • Leukemia Mother    • Breast cancer Mother    • Colon cancer Mother    • Skin cancer Father    • Prostate cancer Father    • Cancer Brother    • Cancer Maternal Grandfather    • Cancer Paternal Grandmother      Social History     Tobacco Use   • Smoking status: Current Every Day Smoker     Types: Cigarettes   • Smokeless tobacco: Never Used   Substance Use Topics   • Alcohol use: Not on file   • Drug use: Never     Allergies:  Sulfa antibiotics    Current Outpatient Medications:   •  albuterol sulfate  (90 Base) MCG/ACT inhaler, Inhale 2 puffs Every 6 (Six) Hours As Needed for Wheezing., Disp: , Rfl:   •  ALPRAZolam (XANAX) 0.5 MG tablet, Take 0.5 mg by mouth 3 (Three) Times a Day As Needed for Anxiety., Disp: , Rfl:   •  dicyclomine (BENTYL) 10 MG capsule, Take 10 mg by mouth 4 (Four) Times a Day Before Meals & at Bedtime., Disp: , Rfl:   •  estradiol (ESTRACE) 1 MG tablet, , Disp: , Rfl:   •  HYDROcodone-acetaminophen (NORCO)  MG per tablet, Take 2 tablets by mouth 4 (Four) Times a Day As Needed for Moderate Pain ., Disp: , Rfl:   •  ipratropium-albuterol (DUO-NEB) 0.5-2.5 mg/3 ml nebulizer, Take 3 mL by nebulization 4 (Four) Times a Day., Disp: 360 mL, Rfl: 2  •  losartan-hydrochlorothiazide (HYZAAR) 100-25 MG per tablet, Take 1 tablet by mouth 2 (Two) Times a Day., Disp: , Rfl:   •  metoprolol  succinate XL (TOPROL-XL) 50 MG 24 hr tablet, Take 100 mg by mouth 2 (Two) Times a Day., Disp: , Rfl:   •  montelukast (SINGULAIR) 10 MG tablet, Take 10 mg by mouth Daily., Disp: , Rfl:   •  omeprazole (priLOSEC) 40 MG capsule, , Disp: , Rfl:     Objective     Vital Signs:  Temp:  [97.4 °F (36.3 °C)] 97.4 °F (36.3 °C)  Heart Rate:  [72] 72  BP: (146)/(69) 146/69    Physical Exam:  Physical Exam   Constitutional: She is oriented to person, place, and time. She appears well-developed and well-nourished. She is cooperative. No distress.   HENT:   Head: Normocephalic and atraumatic.   Right Ear: External ear normal.   Left Ear: External ear normal.   Nose: Septal deviation (leftward) present.       Mouth/Throat: Uvula is midline, oropharynx is clear and moist and mucous membranes are normal.   Eyes: Pupils are equal, round, and reactive to light. Conjunctivae and EOM are normal. Right eye exhibits no discharge. Left eye exhibits no discharge. No scleral icterus.   Neck: Normal range of motion. Neck supple. No JVD present. No tracheal deviation present. No thyromegaly present.   Cardiovascular: Normal rate, regular rhythm and normal heart sounds.   Pulmonary/Chest: Effort normal and breath sounds normal. No stridor.   Nasal canula O2   Musculoskeletal: She exhibits no edema or deformity.   Wheelchair   Lymphadenopathy:     She has no cervical adenopathy.   Neurological: She is alert and oriented to person, place, and time. She has normal strength. No cranial nerve deficit. Coordination normal.   Skin: Skin is warm and dry. No rash noted. She is not diaphoretic. No erythema. No pallor.   Psychiatric: She has a normal mood and affect. Her speech is normal and behavior is normal. Judgment and thought content normal. Cognition and memory are normal.   Nursing note and vitals reviewed.    Biopsy results were reviewed, demonstrating a moderate to poorly differentiated squamous cell carcinoma the nasal tip, and actinic keratosis  of the nasal dorsum:        I have reviewed the MRI results from Cassie 10, 2020 demonstrating no acute or suspicious intracranial findings, mild chronic microvascular changes, and trace fluid within the paranasal sinuses.    Her sodium on May 22, 2020 was 124.  Her chloride was 86.  TSH was normal at 1.4.  Her hemoglobin was 11.6.    Operative plan:        Assessment   Assessment:  1. Chronic obstructive pulmonary disease, unspecified COPD type (CMS/HCC)    2. Tobacco abuse    3. Squamous cell cancer of skin of nose    4. Syncope, unspecified syncope type        Plan   Plan:  I discussed the options of  full excision with frozen section analysis and likely forehead flap reconstruction.      Prior to the surgery, however, I would like these syncopal episodes to be further evaluated.  She is seeing Dr. Alicea tomorrow.  I have ordered a repeat CMP to assess for her hyponatremia, CBC, chest x-ray, EKG, and carotid duplex.  I have also put in a cardiology referral.  I do see her back in approximately 2 weeks to check her baseline, and see where we are going with her surgical candidacy.    Discussion of skin lesion. Discussed risks, benefits, alternatives, and possible complications of excision of the skin lesion with reconstruction utilizing local tissue rearrangement, full-thickness skin grafting, or local interpolated flaps. Risks include, but are not limited too: bleeding, infection, hematoma, recurrence, need for additional procedures, flap failure, cosmetic deformity. Patient understands risks and would like to proceed with surgery.     My findings and recommendations were discussed and questions were answered.     Jovan Trejo MD  06/17/20  11:40

## 2020-06-23 NOTE — TELEPHONE ENCOUNTER
Patient's correspondence have been send to the [facility]. They will contact patient with date and time of her referral appointment.

## 2020-07-01 NOTE — PROGRESS NOTES
Muriel Hernandez  [id number]  1960  60 y.o.  female    Referring Provider: Real Alicea MD    Reason for  Visit:  Initial visit for syncope    shortness of breath   2-3 PPD smoker     Subjective    Moderate chronic exertional shortness of breath on exertion relieved with rest  No significant cough or wheezing    Intermittent palpitations, once every several days to several weeks lasting for less than 1 minute  No associated symptoms of dizziness, weakness, chest pain,  shortness of breath      No associated chest pain  No significant pedal edema    At times when she is exposed to cold air gets chest pain  Feels heavy and also chest tightness   Chest pain at rest and also with exertion   Once a week   Last for a few mins     No nausea   Associated diaphoresis   No radiation     No particular precipitating or relieving factors   Started 6 months      No fever or chills  No significant expectoration    No hemoptysis  Recent syncope, woke up on floor    No preceeding palpitations, no incontinence of urine or stools, no preceeding chest pain. No aura.  No seizure like activity  Started 6 months ago     Occurs several times a week   Gets dizzy and gets nauseous and passes out   Gets warm all over   Gets sweat    Tolerating current medications well with no untoward side effects   Compliant with prescribed medication regimen. Tries to adhere to cardiac diet.     Chronic low back pain    Joint pain in small, medium and large joints         History of present illness:  Muriel Hernandez is a 60 y.o. yo female with history of smoker  who presents today for   Chief Complaint   Patient presents with   • Loss of Consciousness     NEW PT    • Dizziness   • Excessive Sweating   .    History  Past Medical History:   Diagnosis Date   • Arthritis    • Asthma    • Cancer (CMS/HCC)     skin   • Chronic back pain    • COPD (chronic obstructive pulmonary disease) (CMS/HCC)    • Hypertension    • IBS (irritable bowel  syndrome)    • Neoplasm of uncertain behavior     nasal tip   • Skin cancer    • TIA (transient ischemic attack)    • UTI (urinary tract infection)    ,   Past Surgical History:   Procedure Laterality Date   • BREAST SURGERY     • COLONOSCOPY     • HYSTERECTOMY     • KNEE SURGERY     ,   Family History   Problem Relation Age of Onset   • Leukemia Mother    • Breast cancer Mother    • Colon cancer Mother    • Skin cancer Father    • Prostate cancer Father    • Cancer Brother    • Cancer Maternal Grandfather    • Cancer Paternal Grandmother    ,   Social History     Tobacco Use   • Smoking status: Current Every Day Smoker     Packs/day: 1.00     Years: 50.00     Pack years: 50.00     Types: Cigarettes   • Smokeless tobacco: Never Used   Substance Use Topics   • Alcohol use: Not Currently   • Drug use: Never   ,     Medications  Current Outpatient Medications   Medication Sig Dispense Refill   • albuterol sulfate  (90 Base) MCG/ACT inhaler Inhale 2 puffs Every 6 (Six) Hours As Needed for Wheezing.     • ALPRAZolam (XANAX) 0.5 MG tablet Take 0.5 mg by mouth 3 (Three) Times a Day As Needed for Anxiety.     • dicyclomine (BENTYL) 10 MG capsule Take 10 mg by mouth 4 (Four) Times a Day Before Meals & at Bedtime.     • estradiol (ESTRACE) 1 MG tablet      • HYDROcodone-acetaminophen (NORCO)  MG per tablet Take 2 tablets by mouth 4 (Four) Times a Day As Needed for Moderate Pain .     • ipratropium-albuterol (DUO-NEB) 0.5-2.5 mg/3 ml nebulizer Take 3 mL by nebulization 4 (Four) Times a Day. 360 mL 2   • losartan-hydrochlorothiazide (HYZAAR) 100-25 MG per tablet Take 1 tablet by mouth 2 (Two) Times a Day.     • metoprolol succinate XL (TOPROL-XL) 50 MG 24 hr tablet Take 100 mg by mouth 2 (Two) Times a Day.     • montelukast (SINGULAIR) 10 MG tablet Take 10 mg by mouth Daily.     • omeprazole (priLOSEC) 40 MG capsule        No current facility-administered medications for this visit.        Allergies:  Sulfa  "antibiotics    Review of Systems  Review of Systems   Constitution: Positive for malaise/fatigue.   HENT: Negative.    Eyes: Negative.    Cardiovascular: Positive for dyspnea on exertion, palpitations and syncope. Negative for chest pain, claudication, cyanosis, irregular heartbeat, leg swelling, near-syncope, orthopnea and paroxysmal nocturnal dyspnea.   Respiratory: Negative.    Endocrine: Negative.    Hematologic/Lymphatic: Negative.    Skin: Negative.    Musculoskeletal: Positive for arthritis and joint pain.   Gastrointestinal: Negative for anorexia.   Genitourinary: Negative.    Psychiatric/Behavioral: Negative.        Objective     Physical Exam:  /60   Pulse 65   Ht 152.4 cm (60\")   Wt 60.8 kg (134 lb)   SpO2 96%   BMI 26.17 kg/m²     Physical Exam   Constitutional: She appears well-developed and well-nourished.   HENT:   Head: Normocephalic.   Eyes: Lids are normal.   Neck: Normal carotid pulses and no JVD present. No tracheal tenderness present. Carotid bruit is not present. No tracheal deviation and no edema present.   Cardiovascular: Regular rhythm, S1 normal, S2 normal, normal heart sounds and normal pulses.      No systolic murmur is present.  Pulmonary/Chest: Effort normal. No stridor.   Abdominal: Soft. Normal appearance. She exhibits no distension. There is no hepatosplenomegaly. There is no tenderness.   Neurological: She is alert. She has normal strength. No cranial nerve deficit or sensory deficit.   Skin: Skin is warm.   Psychiatric: She has a normal mood and affect. Her speech is normal and behavior is normal. Thought content normal. Cognition and memory are normal.       Results Review:      IMPRESSION:  1. No acute or suspicious intracranial finding.  2. Mild chronic microvascular changes.  3. Trace fluid in the paranasal sinuses.     This report was finalized on 06/10/2020 16:23 by Dr Dang Singh MD.       ECG 12 Lead  Date/Time: 7/1/2020 2:37 PM  Performed by: Julio Cesar Nguyen, " MD  Authorized by: Julio Cesar Nguyen MD   Comparison: not compared with previous ECG   Rhythm: sinus rhythm  Rate: normal  Conduction: conduction normal  ST Segments: ST segments normal  T Waves: T waves normal  QRS axis: normal  Other: no other findings    Clinical impression: normal ECG            Assessment/Plan   Muriel was seen today for loss of consciousness, dizziness and excessive sweating.    Diagnoses and all orders for this visit:    Anemia, unspecified type    Current every day smoker    Frequent falls    Hyponatremia    Nonspecific dizziness    SCCA (squamous cell carcinoma) of skin    Seizures (CMS/HCC)  -     Ambulatory Referral to Neurology    Syncope, unspecified syncope type  -     Adult Transthoracic Echo Complete W/ Cont if Necessary Per Protocol; Future  -     Holter Monitor - 72 Hour Up To 21 Days; Future  -     US Carotid Bilateral; Future  -     Ambulatory Referral to Neurology    Chest heaviness  -     ECG 12 Lead  -     Stress Test With Myocardial Perfusion One Day; Future        ____________________________________________________________________________________________________________________________________________  Health maintenance and recommendations      Low salt/ HTN/ Heart healthy carbohydrate restricted cardiac diet   The patient is advised to reduce or avoid caffeine or other cardiac stimulants.     Minimize or avoid  NSAID-type medications        Monitor for any signs of bleeding including red or dark stools. Fall precautions.        Advised staying uptodate with immunizations per established standard guidelines.      Offered to give patient  a copy of my notes       Questions were encouraged, asked and answered to the patient's  understanding and satisfaction. Questions if any regarding current medications and side effects, need for refills and importance of compliance to medications stressed.    Reviewed available prior notes, consults, prior visits, laboratory findings,  radiology and cardiology relevant reports. Updated chart as applicable. I have reviewed the patient's medical history in detail and updated the computerized patient record as relevant.      Updated patient regarding any new or relevant abnormalities on review of records or any new findings on physical exam. Mentioned to patient about purpose of visit and desirable health short and long term goals and objectives.    Primary to monitor CBC CMP Lipid panel and TSH as applicable    ___________________________________________________________________________________________________________________________________________         Plan      Orders Placed This Encounter   Procedures   • US Carotid Bilateral     Standing Status:   Future     Standing Expiration Date:   7/1/2021     Order Specific Question:   Reason for Exam:     Answer:   syncope   • Ambulatory Referral to Neurology     Referral Priority:   Routine     Referral Type:   Consultation     Referral Reason:   Specialty Services Required     Requested Specialty:   Neurology     Number of Visits Requested:   1   • Holter Monitor - 72 Hour Up To 21 Days     Standing Status:   Future     Standing Expiration Date:   7/1/2021     Order Specific Question:   Reason for exam?     Answer:   Presyncope or Syncope   • Stress Test With Myocardial Perfusion One Day     Standing Status:   Future     Standing Expiration Date:   7/1/2021     Order Specific Question:   What stress agent will be used?     Answer:   Regadenoson (Lexiscan)     Order Specific Question:   Difficulty walking criteria?     Answer:   Musculoskeletal (hips, knees, feet, back, amputee)     Order Specific Question:   Reason for exam?     Answer:   Chest Pain   • ECG 12 Lead     This order was created via procedure documentation   • Adult Transthoracic Echo Complete W/ Cont if Necessary Per Protocol     Standing Status:   Future     Standing Expiration Date:   7/1/2021     Order Specific Question:   Reason for  exam?     Answer:   Dyspnea        Will not order DSE  As she refused and also can increase risk of seizures   Explained much higher risk of breathing issues with Lexiscan   She understands         Return in about 6 weeks (around 8/12/2020).

## 2020-07-08 PROBLEM — C44.321 SQUAMOUS CELL CANCER OF SKIN OF NOSE: Status: ACTIVE | Noted: 2020-01-01

## 2020-07-27 NOTE — NURSING NOTE
"Pt stated had a sip \"about half a sip from a teaspoon\" of coffee this am. Instructed that any amount of caffeine in her system will make the test not work. Pt stated understanding, insisting that it \"was not anything\" and wants to continue with test. Pt aware of possibility that test might not work. This conversation was witnessed by Tim from [facility].  "

## 2020-08-04 NOTE — OP NOTE
PATIENT NAME:  Muriel Hernandez    DATE:  08/04/20    PREOPERATIVE DIAGNOSIS: Squamous cell carcinoma of the nasal tip    POSTOPERATIVE DIAGNOSIS: Squamous cell carcinoma the nasal tip    PROCEDURE:  1) 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, 3.2 cm x 3.2 cm    2) paramedian forehead flap reconstruction of nasal tip with preservation of vascular pedicle    3) complex closure skin of forehead and scalp, 10.0 cm    SURGEON:  Jovan Trejo MD, FACS    FACILITY: [facility] Operating Room    ANESTHESIA: General endotracheal    DICTATED BY:  Jovan Trejo MD, FACS    IVF: Per anesthesia    EBL: 150 cc    IMPLANTS: None    DRAINS: None    SPECIMENS: Squamous cell carcinoma the nasal tip, stitch at 2:00-frozen    COMPLICATIONS: None apparent    INDICATIONS FOR SURGERY: Ms. Hernandez presented with a biopsy-proven moderately to poorly differentiated squamous cell carcinoma the nasal tip.  She opted for surgical excision.    Lesion: 1.3 cm x 1.2 cm  Margins: At least 5 mm  Defect: 3.2 cm x 3.2 cm  Depth: To perichondrium    OPERATIVE FINDINGS: Frozen section demonstrated clear margins.  Pathologist reported this was a deeply infiltrating tumor.    OPERATIVE DETAILS:     After patient verification and informed consent was obtained, the patient was taken to the operating room and laid supine on the operative table.  The skin was cleansed with alcohol, the lesion was marked, and the skin of the nasal envelope and forehead were infiltrated with 10cc of  1% lidocaine of 1:100,000 epinephrine.  The skin was sterilely prepped with Betadine and the patient was sterilely draped.    The nasal subunits were drawn onto the nose.    The lesion was measured, and at least 5 mm margins were taken around the periphery of this.  The entire tip and supratip area was included in the specimen.    The skin was incised using a 15 blade.  Significant bleeding was noted and hemostasis was obtained with bipolar  cautery set at 15.  Dissection was then carried utilizing the scissors down through the musculature and onto the perichondrium.  The specimen was lifted off the lower lateral and upper lateral cartilages, stitch with a marking stitch at 12:00, and sent for frozen section analysis.    Frozen section demonstrated squamous cell carcinoma, deeply invading, with clear peripheral and deep margins.    A left paramedian forehead flap was designed.  A foil template was used to trace the defect and this was transferred to the right superior forehead.  The planned pedicle base of 1.5 cm wide was designed with the center 1.7-2.3 cm lateral to the midline to include the supratrochlear neurovascular pedicle. The forehead skin was incised using a 15 blade.  Distally, the skin was undermined subcutaneous plane.  I dropped into the submuscular plane until 2 cm above the brow.  2 cm above the brow I incised the periosteum using a 15 blade and undermining the subperiosteal plane using a [place] elevator. This area of dissection was performed with added caution in order to help preserve the supratrochlear neurovascular pedicle.  The flap was rotated down and fit the defect well.  The flap was then wrapped in saline soaked gauze for later inset.      The forehead defect was closed.  I dissected in the subcutaneous galeal plane for lateral canthus to lateral canthus with Metzenbaum scissors.  The skin was advanced and closed using deep 3-0 undyed Vicryl suture followed by running locking 5-0 fast absorbing gut.  The superior scalp standing cutaneous deformity was also excised using a 15 blade and then skin was discarded.  Hemostasis was obtained with bipolar cautery set at 15.  The defect was closed using deep 3-0 undyed Vicryl suture followed by surgical staples.  I placed surgical staples to help take tension off the inferior closure of the forehead.    The flap was then unwrapped and inset using deep 4-0 and 5-0 undyed Vicryl suture by  running locking 6-0 fast absorbing gut.  The pedicle edges were gently cauterized using Bovie cautery set at 15.  The pedicle was wrapped with Xeroform gauze. Bacitracin ointment was placed incisions.        DISPOSITION:  The procedures were completed without complication and tolerated well.  The patient was released in the company of her ex- to return home in satisfactory condition.  A follow-up appointment will be scheduled, routine post-op medications prescribed (if required), and post-op instructions were given to the responsible party.           Jovan Trejo MD, FACS  Board Certified Facial Plastic and Reconstructive Surgery  Board Certified Otolaryngology -- Head and Neck Surgery  08/04/20  13:27

## 2020-08-04 NOTE — ANESTHESIA PREPROCEDURE EVALUATION
Anesthesia Evaluation     Patient summary reviewed   no history of anesthetic complications:  NPO Solid Status: > 8 hours  NPO Liquid Status: > 2 hours           Airway   Mallampati: I  TM distance: >3 FB  Neck ROM: full  Dental    (+) edentulous    Pulmonary - normal exam    breath sounds clear to auscultation  (+) a smoker (1 ppd) Current Smoked day of surgery, COPD, asthma,home oxygen (2-3L NCO2 most of the day),   (-) recent URI, sleep apnea  Cardiovascular - normal exam  Exercise tolerance: poor (<4 METS) (Limited by SOB, back pain)    ECG reviewed  Patient on routine beta blocker and Beta blocker given within 24 hours of surgery  Rhythm: regular  Rate: normal    (+) hypertension,   (-) pacemaker, past MI, angina, cardiac stents    ROS comment: Stress Test with [facility] 7/27/20 - ·Raw images reviewed with the following abnormalities noted: scanned with arms to the side.  ·Left ventricular ejection fraction is normal (Calculated EF = 65%).  ·Myocardial perfusion imaging indicates a normal myocardial perfusion study with no evidence of ischemia.  ·Impressions are consistent with a low risk study.    TTE 7/27/20 - ·Estimated EF = 55%.  ·Moderate tricuspid valve regurgitation is present.  ·Right ventricular cavity is mildly dilated.  ·Mildly reduced right ventricular systolic function noted.  ·Abnormal global Longitudinal LV strain = -7.8%.  ·Moderate to severe pulmonary hypertension is present.    Neuro/Psych  (+) TIA (1988), psychiatric history Anxiety,     (-) seizures, CVA  GI/Hepatic/Renal/Endo    (+)  GERD,    (-) liver disease, no renal disease, diabetes, no thyroid disorder    Musculoskeletal     Abdominal    Substance History      OB/GYN          Other                      Anesthesia Plan    ASA 3     general     intravenous induction     Anesthetic plan, all risks, benefits, and alternatives have been provided, discussed and informed consent has been obtained with: patient.

## 2020-08-04 NOTE — ANESTHESIA PROCEDURE NOTES
Airway  Date/Time: 8/4/2020 11:28 AM  Airway not difficult    General Information and Staff    Patient location during procedure: OR  CRNA: Joao Vences CRNA    Indications and Patient Condition  Indications for airway management: airway protection    Preoxygenated: yes      Final Airway Details  Final airway type: endotracheal airway      Successful airway: ETT  Cuffed: yes   Successful intubation technique: direct laryngoscopy  Blade: Rosey  Blade size: 3  ETT size (mm): 7.0  Cormack-Lehane Classification: grade I - full view of glottis  Placement verified by: chest auscultation and capnometry   Cuff volume (mL): 8  Measured from: gums  ETT/EBT to gums (cm): 21  Number of attempts at approach: 1  Assessment: lips, teeth, and gum same as pre-op and atraumatic intubation

## 2020-08-04 NOTE — H&P
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     Subjective      History of Present Illness:  Muriel Hernandez is a  60 y.o. 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  Prior to the biopsy, the lesion of the following characteristics:     Location: Nasal tip  Quality: Nonhealing  Severity: Moderate  Duration: 1.5 years  Timing: constant  Modifying Factors: None  Associated Signs & Symptoms: None        Review of Systems:  Review of Systems   Constitutional: Negative for chills, fever and unexpected weight change.   HENT: Positive for congestion.    Respiratory: Positive for shortness of breath.    Cardiovascular: Positive for palpitations. Negative for chest pain.   Musculoskeletal: Positive for gait problem, joint swelling and neck pain. Negative for back pain.   Skin: Positive for wound. Negative for color change.   Neurological: Positive for syncope and facial asymmetry.   Hematological: Negative for adenopathy. Does not bruise/bleed easily.          There were no vitals taken for this visit.    Past Medical History:   Diagnosis Date   • Anxiety    • Arthritis    • Asthma    • Cancer (CMS/HCC)     skin   • Chronic back pain    • COPD (chronic obstructive pulmonary disease) (CMS/HCC)    • GERD (gastroesophageal reflux disease)    • Glaucoma    • Hypertension    • IBS (irritable bowel syndrome)    • Neoplasm of uncertain behavior     nasal tip   • Skin cancer    • TIA (transient ischemic attack)    • UTI (urinary tract infection)        Past Surgical History:   Procedure Laterality Date   • BREAST SURGERY     • CERVICAL FUSION     • COLONOSCOPY     • HYSTERECTOMY     • KNEE SURGERY         Family History   Problem Relation Age of Onset   • Leukemia Mother    • Breast cancer Mother    • Colon cancer Mother    • Skin cancer Father    • Prostate cancer Father    • Cancer Brother    • Cancer Maternal Grandfather    • Cancer Paternal Grandmother   "      Social History     Socioeconomic History   • Marital status:      Spouse name: Not on file   • Number of children: Not on file   • Years of education: Not on file   • Highest education level: Not on file   Tobacco Use   • Smoking status: Current Every Day Smoker     Packs/day: 1.00     Years: 50.00     Pack years: 50.00     Types: Cigarettes   • Smokeless tobacco: Never Used   Substance and Sexual Activity   • Alcohol use: Not Currently   • Drug use: Never   • Sexual activity: Defer       Allergies   Allergen Reactions   • Sulfa Antibiotics Hives   • Local [Lidocaine] Other (See Comments)     \"novocaine\" causes sneezing       No current facility-administered medications for this encounter.     Current Outpatient Medications:   •  albuterol sulfate  (90 Base) MCG/ACT inhaler, Inhale 2 puffs Every 6 (Six) Hours As Needed for Wheezing., Disp: , Rfl:   •  ALPRAZolam (XANAX) 0.5 MG tablet, Take 0.5 mg by mouth 3 (Three) Times a Day As Needed for Anxiety., Disp: , Rfl:   •  estradiol (ESTRACE) 1 MG tablet, , Disp: , Rfl:   •  HYDROcodone-acetaminophen (NORCO)  MG per tablet, Take 2 tablets by mouth 4 (Four) Times a Day As Needed for Moderate Pain ., Disp: , Rfl:   •  ipratropium-albuterol (DUO-NEB) 0.5-2.5 mg/3 ml nebulizer, Take 3 mL by nebulization 4 (Four) Times a Day., Disp: 360 mL, Rfl: 2  •  losartan-hydrochlorothiazide (HYZAAR) 100-25 MG per tablet, Take 1 tablet by mouth 2 (Two) Times a Day., Disp: , Rfl:   •  Methocarbamol (ROBAXIN-750 PO), Take 1 tablet by mouth As Needed., Disp: , Rfl:   •  metoprolol succinate XL (TOPROL-XL) 50 MG 24 hr tablet, Take 100 mg by mouth 2 (Two) Times a Day., Disp: , Rfl:   •  montelukast (SINGULAIR) 10 MG tablet, Take 10 mg by mouth Daily., Disp: , Rfl:   •  omeprazole (priLOSEC) 40 MG capsule, , Disp: , Rfl:   •  Potassium 99 MG tablet, Take 1 tablet by mouth Daily., Disp: , Rfl:          Objective         Vital Signs:  There were no vitals taken for " this visit.       Physical Exam:  Physical Exam   Constitutional: She is oriented to person, place, and time. She appears well-developed and well-nourished. She is cooperative. No distress.   HENT:   Head: Normocephalic and atraumatic.   Right Ear: External ear normal.   Left Ear: External ear normal.   Nose: Septal deviation (leftward) present.       Eyes: Pupils are equal, round, and reactive to light. Conjunctivae and EOM are normal. Right eye exhibits no discharge. Left eye exhibits no discharge. No scleral icterus.   Neck: Normal range of motion. Neck supple. No JVD present. No tracheal deviation present. No thyromegaly present.   Cardiovascular: Normal rate, regular rhythm and normal heart sounds.   Pulmonary/Chest: Effort normal and breath sounds normal. No stridor.   Nasal canula O2   Musculoskeletal: She exhibits no edema or deformity.   Lymphadenopathy:     She has no cervical adenopathy.   Neurological: She is alert and oriented to person, place, and time. She has normal strength. No cranial nerve deficit. Coordination normal.   Skin: Skin is warm and dry. No rash noted. She is not diaphoretic. No erythema. No pallor.   Psychiatric: She has a normal mood and affect. Her speech is normal and behavior is normal. Judgment and thought content normal. Cognition and memory are normal.   Nursing note and vitals reviewed.     Biopsy results were reviewed, demonstrating a moderate to poorly differentiated squamous cell carcinoma the nasal tip, and actinic keratosis of the nasal dorsum:          Operative plan:             Assessment      Assessment:  1. Chronic obstructive pulmonary disease, unspecified COPD type (CMS/HCC)    2. Tobacco abuse    3. Squamous cell cancer of skin of nose    4. Syncope, unspecified syncope type             Plan      Plan:  I discussed the options of  full excision with frozen section analysis and likely forehead flap reconstruction.       Discussion of skin lesion. Discussed risks,  benefits, alternatives, and possible complications of excision of the skin lesion with reconstruction utilizing local tissue rearrangement, full-thickness skin grafting, or local interpolated flaps. Risks include, but are not limited too: bleeding, infection, hematoma, recurrence, need for additional procedures, flap failure, cosmetic deformity. Patient understands risks and would like to proceed with surgery.      My findings and recommendations were discussed and questions were answered.      Jovan Trejo MD

## 2020-08-04 NOTE — ANESTHESIA POSTPROCEDURE EVALUATION
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        Post Anesthesia Care and Evaluation    Patient location during evaluation: PACU  Patient participation: complete - patient participated  Level of consciousness: awake and alert  Pain management: adequate  Airway patency: patent  Anesthetic complications: No anesthetic complications  PONV Status: controlled  Cardiovascular status: acceptable and hemodynamically stable  Respiratory status: acceptable  Hydration status: acceptable    Comments: Patient discharged from PACU prior to anesthesia evaluation based on Irene Score.  For details, see RN note.     /76   Pulse 85   Temp 98 °F (36.7 °C)   Resp 22   SpO2 98%   Breastfeeding No

## 2020-08-04 NOTE — DISCHARGE INSTRUCTIONS

## 2020-08-11 NOTE — PROGRESS NOTES
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  Pain is moderate.  She denies fever, chills, bleeding, or drainage.    Pathology Review:      OBJECTIVE:  /62   Pulse 79   Temp 97.2 °F (36.2 °C) (Temporal)   Ht 154.9 cm (61\")   Wt 70.8 kg (156 lb)   BMI 29.48 kg/m²   Forehead and scalp incision is healing well without evidence of infection.  Sutures and staples were removed.  Xeroform dressing was changed.  The forehead flap is well perfused.  There is an 3 mm dusky area along the edge of the distal nasal tip.    ASSESSMENT:  Muriel was seen today for post-op.    Diagnoses and all orders for this visit:    Squamous cell cancer of skin of nose    Tobacco abuse        PLAN:   Continue wound care as instructed and keep coated with mupirocin.  Follow-up in 1 week for Xeroform dressing change.  We will plan for pedicle division and inset in 2 weeks.       Please consider quitting tobacco use. Tobacco use can aggravate most ENT conditions we treat and can complicate or limit the effects of treatment of these conditions. Surgical outcomes are also affected by tobacco use including a higher risk of surgical complications. There are may options to assist the process of tobacco cessation including medicines, therapies and counseling. If you are interested in quitting, our office will help direct you to the best options to acheive this goal.    SHANNAN Knapp   08/11/2020  9:57 AM      "

## 2020-08-13 NOTE — PROGRESS NOTES
Muriel Hernandez  [id number]  1960  60 y.o.  female    Referring Provider: Real Alicea MD    Reason for  Visit:  Initial visit for syncope    shortness of breath   2-3 PPD smoker   Cardiac workup test results as below   Had extensive facial surgery for squamous cell ca    Subjective    Moderate chronic exertional shortness of breath on exertion relieved with rest  No significant cough or wheezing    Intermittent palpitations, once every several days to several weeks lasting for less than 1 minute  No associated symptoms of dizziness, weakness, chest pain,  shortness of breath      No associated chest pain  No significant pedal edema    At times when she is exposed to cold air gets chest pain  Feels heavy and also chest tightness   Chest pain at rest and also with exertion   Once a week   Last for a few mins     No nausea   Associated diaphoresis   No radiation     No particular precipitating or relieving factors   Started 6 months      No fever or chills  No significant expectoration    No hemoptysis  Recurrent  syncope, woke up on floor and recurrent presyncope     No preceeding palpitations, no incontinence of urine or stools, no preceeding chest pain. No aura.  No seizure like activity  Started > 6 months ago     Occurs several times a week   Gets dizzy and gets nauseous and passes out   Gets warm all over   Gets sweat    Tolerating current medications well with no untoward side effects   Compliant with prescribed medication regimen. Tries to adhere to cardiac diet.     Chronic low back pain    Joint pain in small, medium and large joints         History of present illness:  Muriel Hernandez is a 60 y.o. yo female with history of smoker  who presents today for   Chief Complaint   Patient presents with   • Loss of Consciousness     1 MON FU/ RESULTS    • Fatigue   • Shortness of Breath   • Palpitations   .    History  Past Medical History:   Diagnosis Date   • Anxiety    • Arthritis    •  Asthma    • Cancer (CMS/HCC)     skin   • Chronic back pain    • COPD (chronic obstructive pulmonary disease) (CMS/HCC)    • GERD (gastroesophageal reflux disease)    • Glaucoma    • Hypertension    • IBS (irritable bowel syndrome)    • Neoplasm of uncertain behavior     nasal tip   • On home oxygen therapy    • Skin cancer    • TIA (transient ischemic attack)    • UTI (urinary tract infection)    ,   Past Surgical History:   Procedure Laterality Date   • BREAST SURGERY Left 1982    NO LYMPH NODES REMOVED   • CERVICAL FUSION     • COLONOSCOPY     • HEAD/NECK LESION/CYST EXCISION Bilateral 8/4/2020    Procedure: 1) 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, 3.2 cm x 3.2 cm   2) paramedian forehead flap reconstruction of nasal tip with preservation of vascular pedicle   3) complex closure skin of forehead and scalp, 10.0 cm;  Surgeon: Jovan Trejo MD;  Location: [facility];  Service: ENT;  Laterality: Bilateral;   • HYSTERECTOMY     • KNEE SURGERY     • NOSE SURGERY     ,   Family History   Problem Relation Age of Onset   • Leukemia Mother    • Breast cancer Mother    • Colon cancer Mother    • Skin cancer Father    • Prostate cancer Father    • Cancer Brother    • Cancer Maternal Grandfather    • Cancer Paternal Grandmother    ,   Social History     Tobacco Use   • Smoking status: Current Every Day Smoker     Packs/day: 1.00     Years: 50.00     Pack years: 50.00     Types: Cigarettes   • Smokeless tobacco: Never Used   Substance Use Topics   • Alcohol use: Not Currently   • Drug use: Never   ,     Medications  Current Outpatient Medications   Medication Sig Dispense Refill   • albuterol sulfate  (90 Base) MCG/ACT inhaler Inhale 2 puffs Every 6 (Six) Hours As Needed for Wheezing.     • ALPRAZolam (XANAX) 0.5 MG tablet Take 0.5 mg by mouth 3 (Three) Times a Day As Needed for Anxiety.     • cephalexin (KEFLEX) 500 MG capsule Take 1 capsule by mouth 3 (Three) Times a Day. 21 capsule 0   •  "estradiol (ESTRACE) 1 MG tablet Take 1 mg by mouth Daily.     • HYDROcodone-acetaminophen (NORCO)  MG per tablet Take 1 tablet by mouth Every 4 (Four) Hours As Needed for Moderate Pain  (Pain). 18 tablet 0   • ipratropium-albuterol (DUO-NEB) 0.5-2.5 mg/3 ml nebulizer Take 3 mL by nebulization 4 (Four) Times a Day. 360 mL 2   • losartan-hydrochlorothiazide (HYZAAR) 100-25 MG per tablet Take 1 tablet by mouth 2 (Two) Times a Day.     • Methocarbamol (ROBAXIN-750 PO) Take 1 tablet by mouth As Needed.     • metoprolol succinate XL (TOPROL-XL) 50 MG 24 hr tablet Take 100 mg by mouth 2 (Two) Times a Day.     • montelukast (SINGULAIR) 10 MG tablet Take 10 mg by mouth Daily.     • omeprazole (priLOSEC) 40 MG capsule      • Potassium 99 MG tablet Take 1 tablet by mouth Daily.     • bumetanide (BUMEX) 0.5 MG tablet Take 1 tablet by mouth Daily As Needed (EDEMA). 30 tablet 3   • dilTIAZem CD (CARDIZEM CD) 120 MG 24 hr capsule Take 1 capsule by mouth Daily. 30 capsule 11     No current facility-administered medications for this visit.        Allergies:  Sulfa antibiotics and Local [lidocaine]    Review of Systems  Review of Systems   Constitution: Positive for malaise/fatigue.   HENT: Negative.    Eyes: Negative.    Cardiovascular: Positive for dyspnea on exertion, palpitations and syncope. Negative for chest pain, claudication, cyanosis, irregular heartbeat, leg swelling, near-syncope, orthopnea and paroxysmal nocturnal dyspnea.   Respiratory: Negative.    Endocrine: Negative.    Hematologic/Lymphatic: Negative.    Skin: Negative.    Musculoskeletal: Positive for arthritis and joint pain.   Gastrointestinal: Negative for anorexia.   Genitourinary: Negative.    Psychiatric/Behavioral: Negative.        Objective     Physical Exam:  /52   Pulse 74   Ht 154.9 cm (60.98\")   Wt 70.8 kg (156 lb)   BMI 29.49 kg/m²     Physical Exam   Constitutional: She appears well-developed and well-nourished.   HENT:   Head: " Normocephalic.   Eyes: Lids are normal.   Neck: Normal carotid pulses and no JVD present. No tracheal tenderness present. Carotid bruit is not present. No tracheal deviation and no edema present.   Cardiovascular: Regular rhythm, S1 normal, S2 normal, normal heart sounds and normal pulses.      No systolic murmur is present.  Pulmonary/Chest: Effort normal. No stridor.   Abdominal: Soft. Normal appearance. She exhibits no distension. There is no hepatosplenomegaly. There is no tenderness.   Neurological: She is alert. She has normal strength. No cranial nerve deficit or sensory deficit.   Skin: Skin is warm.   Psychiatric: She has a normal mood and affect. Her speech is normal and behavior is normal. Thought content normal. Cognition and memory are normal.   facial surgery       Results Review:      Muriel Hernandez   Holter Monitor 72Hr-21Day (0296T/0298T)   Order# [id number]   Reading physician: Julio Cesar Nguyen MD Ordering physician: Julio Cesar Nguyen MD Study date: 20   Patient Information     Patient Name  Muriel Hernandez MRN  [id number] Sex  Female  (Age)  1960 (60 y.o.)   Interpretation Summary        · Average HR: 70. Min HR: 56. Max HR: 141.     · Entire report was reviewed.  Monitoring in days: ~14   · The predominant rhythm noted during the testing period was sinus rhythm.     · Rare supraventricular ectopics with an APC burden of: < 1%  · 18 runs of supraventricular tachycardia longest 19 beats at a maximum rate of 141 bpm and an average rate of 111 bpm         · Rare premature ventricular contractions with a PVC burden of: < 1%  · No significant  ventricular tachy or deya arrhythmia.     · No correlated arrhythmia  · No significant pauses           Conclusion: Baseline rhythm is sinus.  No significant ectopy   18 runs of supraventricular tachycardia longest 19 beats at a maximum rate of 141 bpm and an average rate of 111 bpm          History: Carotid occlusive disease      IMPRESSION:  Impression:  1. There is less than 50% stenosis of the right internal carotid artery.  2. There is less than 50% stenosis of the left internal carotid artery.  3. Antegrade flow is demonstrated in bilateral vertebral arteries.           Results for orders placed during the hospital encounter of 20   Adult Transthoracic Echo Complete W/ Cont if Necessary Per Protocol    Narrative · Estimated EF = 55%.  · Moderate tricuspid valve regurgitation is present.  · Right ventricular cavity is mildly dilated.  · Mildly reduced right ventricular systolic function noted.  · Abnormal global Longitudinal LV strain = -7.8%.  · Moderate to severe pulmonary hypertension is present.             Muriel Hernandez   Regadenoson Stress Test With Myocardial Perfusion SPECT (Multi Study)   Order# [id number]   Reading physician: Julio Cesar Nguyen MD Ordering physician: Julio Cesar Nguyen MD Study date: 20   Patient Information     Patient Name  Muriel Hernandez MRN  [id number] Sex  Female  (Age)  1960 (60 y.o.)   Interpretation Summary        · Raw images reviewed with the following abnormalities noted: scanned with arms to the side.  · Left ventricular ejection fraction is normal (Calculated EF = 65%).  · Myocardial perfusion imaging indicates a normal myocardial perfusion study with no evidence of ischemia.  · Impressions are consistent with a low risk study.           IMPRESSION:  1. No acute or suspicious intracranial finding.  2. Mild chronic microvascular changes.  3. Trace fluid in the paranasal sinuses.     This report was finalized on 06/10/2020 16:23 by Dr Dang Singh MD.     Procedures    Assessment/Plan   Muriel was seen today for loss of consciousness, fatigue, shortness of breath and palpitations.    Diagnoses and all orders for this visit:    SCCA (squamous cell carcinoma) of skin    Current every day smoker    Squamous cell cancer of skin of nose    Syncope, unspecified syncope type  -      Loop Recorder Implant - Treatment Room; Future    Other orders  -     dilTIAZem CD (CARDIZEM CD) 120 MG 24 hr capsule; Take 1 capsule by mouth Daily.  -     bumetanide (BUMEX) 0.5 MG tablet; Take 1 tablet by mouth Daily As Needed (EDEMA).        ____________________________________________________________________________________________________________________________________________  Health maintenance and recommendations    Similar recommendations as last visit   Offered to give patient  a copy of my notes   Questions were encouraged, asked and answered to the patient's  understanding and satisfaction. Questions if any regarding current medications and side effects, need for refills and importance of compliance to medications stressed.    Reviewed available prior notes, consults, prior visits, laboratory findings, radiology and cardiology relevant reports. Updated chart as applicable. I have reviewed the patient's medical history in detail and updated the computerized patient record as relevant.      Updated patient regarding any new or relevant abnormalities on review of records or any new findings on physical exam. Mentioned to patient about purpose of visit and desirable health short and long term goals and objectives.    Primary to monitor CBC CMP Lipid panel and TSH as applicable    ___________________________________________________________________________________________________________________________________________         Plan        Check BP and heart rates twice daily at least 3x / week at home and bring a recording for me to review next visit  If BP >140/90 or < 100/60 persistently over 3 reading 30 mins apart call sooner     Requested Prescriptions     Signed Prescriptions Disp Refills   • dilTIAZem CD (CARDIZEM CD) 120 MG 24 hr capsule 30 capsule 11     Sig: Take 1 capsule by mouth Daily.   • bumetanide (BUMEX) 0.5 MG tablet 30 tablet 3     Sig: Take 1 tablet by mouth Daily As Needed (EDEMA).       As infrequent but  intense unannounced symptoms of syncope with high risk of injury to self and others will recommend LINQ   Needs pre certification for this.     Orders Placed This Encounter   Procedures   • Loop Recorder Implant - Treatment Room     Standing Status:   Future     Standing Expiration Date:   8/13/2021     Order Specific Question:   What type of sedation does the patient require?     Answer:   Other     Order Specific Question:   Other (comment)     Answer:   LOCAL     Order Specific Question:   Reason for Exam:     Answer:   SYNCOPE      Follow up with Agnes CAMPBELL to address interim issues       Return in about 6 weeks (around 9/24/2020).

## 2020-08-17 NOTE — PROGRESS NOTES
"CC/Reason for visit: Muriel Hernandez returns to the office following excision of a squamous cell carcinoma of the nasal tip with paramedian forehead flap and complex closure of the forehead and scalp on August 4, 2020    SUBJECTIVE:  Since surgery, she has been doing relatively well.  Pain is moderate.  She denies fever, chills, bleeding, or drainage.      OBJECTIVE:  /76   Pulse 80   Temp 98 °F (36.7 °C)   Resp 20   Ht 152.4 cm (60\")   Wt 70.8 kg (156 lb)   BMI 30.47 kg/m²   The pedicle was rewrapped.  At the distal aspect, there is some duskiness to the skin.    ASSESSMENT:  There are no diagnoses linked to this encounter.    PLAN:   I would like her to continue the antibiotic ointment onto the nose.  Had like to push back her pedicle division for at least 1 week.  Plan to see her next week for dressing change and to evaluate the distal tip necrosis.     Please consider quitting tobacco use. Tobacco use can aggravate most ENT conditions we treat and can complicate or limit the effects of treatment of these conditions. Surgical outcomes are also affected by tobacco use including a higher risk of surgical complications. There are may options to assist the process of tobacco cessation including medicines, therapies and counseling. If you are interested in quitting, our office will help direct you to the best options to acheive this goal.    Jovan Trejo MD   08/11/2020  9:57 AM      "

## 2020-08-24 NOTE — PROGRESS NOTES
"CC/Reason for visit: Iriwendy Sunshine Hernandez returns to the office following excision of a squamous cell carcinoma of the nasal tip with paramedian forehead flap and complex closure of the forehead and scalp on August 4, 2020    SUBJECTIVE:  Since surgery, she has been doing relatively well.  Pain is moderate.  She denies fever, chills, bleeding, or drainage.      OBJECTIVE:  /80   Pulse 85   Temp 98 °F (36.7 °C)   Resp 20   Ht 152.4 cm (60\")   Wt 70.8 kg (156 lb)   BMI 30.47 kg/m²   The pedicle was rewrapped.  At the distal aspect, there is healing epidermal lysis.  This was debrided.    ASSESSMENT:  Muriel was seen today for post-op.    Diagnoses and all orders for this visit:    Squamous cell cancer of skin of nose    Tobacco abuse        PLAN:   Due to the compromised wound healing, likely from smoking, I would like to push back her surgery another week.  I like to see her in 1 week for wound check.     Please consider quitting tobacco use. Tobacco use can aggravate most ENT conditions we treat and can complicate or limit the effects of treatment of these conditions. Surgical outcomes are also affected by tobacco use including a higher risk of surgical complications. There are may options to assist the process of tobacco cessation including medicines, therapies and counseling. If you are interested in quitting, our office will help direct you to the best options to acheive this goal.    Jovan Trejo MD   08/11/2020  9:57 AM      "

## 2020-08-31 NOTE — PROGRESS NOTES
CC/Reason for visit: Muriel Sunshine Hernandez returns to the office following excision of a squamous cell carcinoma of the nasal tip with paramedian forehead flap and complex closure of the forehead and scalp on August 4, 2020    SUBJECTIVE:  Since surgery, she has been doing relatively well.  Pain is moderate.  She denies fever, chills, bleeding, or drainage.      OBJECTIVE:  /64   Pulse 74   Temp 96.8 °F (36 °C) (Temporal)   The pedicle was rewrapped.  At the distal aspect, there is healing epidermal lysis.  This was debrided.  2 spitting stitches on the forehead, and 2 on the nose were also removed.    ASSESSMENT:  Muriel was seen today for follow-up.    Diagnoses and all orders for this visit:    Squamous cell cancer of skin of nose    Tobacco abuse        PLAN:   Due to the compromised wound healing, likely from smoking, I would like to push back her surgery another week.  I like to see her in 1 week for wound check.  I offered the pedicle division and flap inset for tomorrow, she would like to wait another week.    Please consider quitting tobacco use. Tobacco use can aggravate most ENT conditions we treat and can complicate or limit the effects of treatment of these conditions. Surgical outcomes are also affected by tobacco use including a higher risk of surgical complications. There are may options to assist the process of tobacco cessation including medicines, therapies and counseling. If you are interested in quitting, our office will help direct you to the best options to acheive this goal.    Jovan Trejo MD   08/11/2020  9:57 AM

## 2020-08-31 NOTE — PATIENT INSTRUCTIONS
CONTACT INFORMATION:  The main office phone number is [phone]. For emergencies after hours and on weekends, this number will convert over to our answering service and the on call provider will answer. Please try to keep non emergent phone calls/ questions to office hours 9am-5pm Monday through Friday.     [employer]  As an alternative, you can sign up and use the Epic MyChart system for more direct and quicker access for non emergent questions/ problems.  [facility] [employer] allows you to send messages to your doctor, view your test results, renew your prescriptions, schedule appointments, and more. To sign up, go to [employer] and click on the Sign Up Now link in the New User? box. Enter your [employer] Activation Code exactly as it appears below along with the last four digits of your Social Security Number and your Date of Birth () to complete the sign-up process. If you do not sign up before the expiration date, you must request a new code.    [employer] Activation Code: Activation code not generated  Current [employer] Status: Patient Declined    If you have questions, you can email [email or web address] or call [phone] to talk to our [employer] staff. Remember, [employer] is NOT to be used for urgent needs. For medical emergencies, dial 911.    IF YOU SMOKE OR USE TOBACCO PLEASE READ THE FOLLOWING:  Why is smoking bad for me?  Smoking increases the risk of heart disease, lung disease, vascular disease, stroke, and cancer. If you smoke, STOP!      For more information:  Smoke-Free [place]  866-QUIT-YES  http://www.smoke-free.[place].gov/sf_quit.htm

## 2020-09-10 NOTE — DISCHARGE INSTRUCTIONS
DAY OF SURGERY INSTRUCTIONS        YOUR SURGEON: FELECIA MARIN    PROCEDURE: PEDICLE DIVISION AND FLAP INSERT    DATE OF SURGERY: 9/15/2020    ARRIVAL TIME: AS DIRECTED BY OFFICE    YOU MAY TAKE THE FOLLOWING MEDICATION(S) THE MORNING OF SURGERY WITH A SIP OF WATER: METOPROLOL, DILTIAZEM    ALL OTHER HOME MEDICATIONS CHECK WITH YOUR DOCTOR    DO NOT TAKE ANY ERECTILE DYSFUNCTION MEDICATIONS (EX:  CIALIS, VIAGRA) 24 HOURS PRIOR TO SURGERY              MANAGING PAIN AFTER SURGERY    We know you are probably wondering what your pain will be like after surgery.  Following surgery it is unrealistic to expect you will not have pain.   Pain is how our bodies let us know that something is wrong or cautions us to be careful.  That said, our goal is to make your pain tolerable.    Methods we may use to treat your pain include (oral or IV medications, PCAs, epidurals, nerve blocks, etc.)   While some procedures require IV pain medications for a short time after surgery, transitioning to pain medications by mouth allows for better management of pain.   Your nurse will encourage you to take oral pain medications whenever possible.  IV medications work almost immediately, but only last a short while.  Taking medications by mouth allows for a more constant level of medication in your blood stream for a longer period of time.      Once your pain is out of control it is harder to get back under control.  It is important you are aware when your next dose of pain medication is due.  If you are admitted, your nurse may write the time of your next dose on the white board in your room to help you remember.      We are interested in your pain and encourage you to inform us about aggravating factors during your visit.   Many times a simple repositioning every few hours can make a big difference.    If your physician says it is okay, do not let your pain prevent you from getting out of bed. Be sure to call your nurse for assistance prior to  getting up so you do not fall.      Before surgery, please decide your tolerable pain goal.  These faces help describe the pain ratings we use on a 0-10 scale.   Be prepared to tell us your goal and whether or not you take pain or anxiety medications at home.      BEFORE YOU COME TO THE HOSPITAL  (Pre-op instructions)  • Do not eat, drink, smoke or chew gum after midnight the night before surgery.  This also includes no mints.  • Morning of surgery take only the medicines you have been instructed with a sip of water unless otherwise instructed  by your physician.  • Do not shave, wear makeup or dark nail polish.  • Remove all jewelry including rings.  • Leave anything you consider valuable at home.  • Leave your suitcase in the car until after your surgery.  • Bring the following with you if applicable:  o Picture ID and insurance, Medicare or Medicaid cards  o Co-pay/deductible required by insurance (cash, check, credit card)  o Copy of advance directive, living will or power-of- documents if not brought to PAT  o CPAP or BIPAP mask and tubing  o Relaxation aids ( book, magazine), etc.  o Hearing aids                                 ON THE DAY OF SURGERY  · On the day of surgery check in at registration located at the main entrance of the hospital.   ? You will be registered and given a beeper with instructions where to wait in the main lobby.  ? When your beeper lights up and vibrates a member of the Outpatient Surgery staff will meet you at the double doors under the stair steps and escort you to your preoperative room.   · You may have cloth compression devices placed on your legs. These help to prevent blood clots and reduce swelling in your legs.  · An IV may be inserted into one of your veins.  · In the operating room, you may be given one or more of the following:  ? A medicine to help you relax (sedative).  ? A medicine to numb the area (local anesthetic).  ? A medicine to make you fall asleep  "(general anesthetic).  ? A medicine that is injected into an area of your body to numb everything below the injection site (regional anesthetic).  · Your surgical site will be marked or identified.  · You may be given an antibiotic through your IV to help prevent infection.  Contact a health care provider if you:  · Develop a fever of more than 100.4°F (38°C) or other feelings of illness during the 48 hours before your surgery.  · Have symptoms that get worse.  Have questions or concerns about your surgery    General Anesthesia/Surgery, Adult  General anesthesia is the use of medicines to make a person \"go to sleep\" (unconscious) for a medical procedure. General anesthesia must be used for certain procedures, and is often recommended for procedures that:  · Last a long time.  · Require you to be still or in an unusual position.  · Are major and can cause blood loss.  The medicines used for general anesthesia are called general anesthetics. As well as making you unconscious for a certain amount of time, these medicines:  · Prevent pain.  · Control your blood pressure.  · Relax your muscles.  Tell a health care provider about:  · Any allergies you have.  · All medicines you are taking, including vitamins, herbs, eye drops, creams, and over-the-counter medicines.  · Any problems you or family members have had with anesthetic medicines.  · Types of anesthetics you have had in the past.  · Any blood disorders you have.  · Any surgeries you have had.  · Any medical conditions you have.  · Any recent upper respiratory, chest, or ear infections.  · Any history of:  ? Heart or lung conditions, such as heart failure, sleep apnea, asthma, or chronic obstructive pulmonary disease (COPD).  ?  service.  ? Depression or anxiety.  · Any tobacco or drug use, including marijuana or alcohol use.  · Whether you are pregnant or may be pregnant.  What are the risks?  Generally, this is a safe procedure. However, problems may " occur, including:  · Allergic reaction.  · Lung and heart problems.  · Inhaling food or liquid from the stomach into the lungs (aspiration).  · Nerve injury.  · Air in the bloodstream, which can lead to stroke.  · Extreme agitation or confusion (delirium) when you wake up from the anesthetic.  · Waking up during your procedure and being unable to move. This is rare.  These problems are more likely to develop if you are having a major surgery or if you have an advanced or serious medical condition. You can prevent some of these complications by answering all of your health care provider's questions thoroughly and by following all instructions before your procedure.  General anesthesia can cause side effects, including:  · Nausea or vomiting.  · A sore throat from the breathing tube.  · Hoarseness.  · Wheezing or coughing.  · Shaking chills.  · Tiredness.  · Body aches.  · Anxiety.  · Sleepiness or drowsiness.  · Confusion or agitation.  RISKS AND COMPLICATIONS OF SURGERY  Your health care provider will discuss possible risks and complications with you before surgery. Common risks and complications include:    · Problems due to the use of anesthetics.  · Blood loss and replacement (does not apply to minor surgical procedures).  · Temporary increase in pain due to surgery.  · Uncorrected pain or problems that the surgery was meant to correct.  · Infection.  · New damage.    What happens before the procedure?    Medicines  Ask your health care provider about:  · Changing or stopping your regular medicines. This is especially important if you are taking diabetes medicines or blood thinners.  · Taking medicines such as aspirin and ibuprofen. These medicines can thin your blood. Do not take these medicines unless your health care provider tells you to take them.  · Taking over-the-counter medicines, vitamins, herbs, and supplements. Do not take these during the week before your procedure unless your health care provider  approves them.  General instructions  · Starting 3-6 weeks before the procedure, do not use any products that contain nicotine or tobacco, such as cigarettes and e-cigarettes. If you need help quitting, ask your health care provider.  · If you brush your teeth on the morning of the procedure, make sure to spit out all of the toothpaste.  · Tell your health care provider if you become ill or develop a cold, cough, or fever.  · If instructed by your health care provider, bring your sleep apnea device with you on the day of your surgery (if applicable).  · Ask your health care provider if you will be going home the same day, the following day, or after a longer hospital stay.  ? Plan to have someone take you home from the hospital or clinic.  ? Plan to have a responsible adult care for you for at least 24 hours after you leave the hospital or clinic. This is important.  What happens during the procedure?  · You will be given anesthetics through both of the following:  ? A mask placed over your nose and mouth.  ? An IV in one of your veins.  · You may receive a medicine to help you relax (sedative).  · After you are unconscious, a breathing tube may be inserted down your throat to help you breathe. This will be removed before you wake up.  · An anesthesia specialist will stay with you throughout your procedure. He or she will:  ? Keep you comfortable and safe by continuing to give you medicines and adjusting the amount of medicine that you get.  ? Monitor your blood pressure, pulse, and oxygen levels to make sure that the anesthetics do not cause any problems.  The procedure may vary among health care providers and hospitals.  What happens after the procedure?  · Your blood pressure, temperature, heart rate, breathing rate, and blood oxygen level will be monitored until the medicines you were given have worn off.  · You will wake up in a recovery area. You may wake up slowly.  · If you feel anxious or agitated, you may  be given medicine to help you calm down.  · If you will be going home the same day, your health care provider may check to make sure you can walk, drink, and urinate.  · Your health care provider will treat any pain or side effects you have before you go home.  · Do not drive for 24 hours if you were given a sedative.  Summary  · General anesthesia is used to keep you still and prevent pain during a procedure.  · It is important to tell your healthcare provider about your medical history and any surgeries you have had, and previous experience with anesthesia.  · Follow your healthcare provider’s instructions about when to stop eating, drinking, or taking certain medicines before your procedure.  · Plan to have someone take you home from the hospital or clinic.  This information is not intended to replace advice given to you by your health care provider. Make sure you discuss any questions you have with your health care provider.  Document Released: 03/26/2009 Document Revised: 08/03/2018 Document Reviewed: 08/03/2018  [employer] Interactive Patient Education © 2019 [employer]      Fall Prevention in Hospitals, Adult  As a hospital patient, your condition and the treatments you receive can increase your risk for falls. Some additional risk factors for falls in a hospital include:  · Being in an unfamiliar environment.  · Being on bed rest.  · Your surgery.  · Taking certain medicines.  · Your tubing requirements, such as intravenous (IV) therapy or catheters.  It is important that you learn how to decrease fall risks while at the hospital. Below are important tips that can help prevent falls.  SAFETY TIPS FOR PREVENTING FALLS  Talk about your risk of falling.  · Ask your health care provider why you are at risk for falling. Is it your medicine, illness, tubing placement, or something else?  · Make a plan with your health care provider to keep you safe from falls.  · Ask your health care provider or pharmacist about  side effects of your medicines. Some medicines can make you dizzy or affect your coordination.  Ask for help.  · Ask for help before getting out of bed. You may need to press your call button.  · Ask for assistance in getting safely to the toilet.  · Ask for a walker or cane to be put at your bedside. Ask that most of the side rails on your bed be placed up before your health care provider leaves the room.  · Ask family or friends to sit with you.  · Ask for things that are out of your reach, such as your glasses, hearing aids, telephone, bedside table, or call button.  Follow these tips to avoid falling:  · Stay lying or seated, rather than standing, while waiting for help.  · Wear rubber-soled slippers or shoes whenever you walk in the hospital.  · Avoid quick, sudden movements.  ¨ Change positions slowly.  ¨ Sit on the side of your bed before standing.  ¨ Stand up slowly and wait before you start to walk.  · Let your health care provider know if there is a spill on the floor.  · Pay careful attention to the medical equipment, electrical cords, and tubes around you.  · When you need help, use your call button by your bed or in the bathroom. Wait for one of your health care providers to help you.  · If you feel dizzy or unsure of your footing, return to bed and wait for assistance.  · Avoid being distracted by the TV, telephone, or another person in your room.  · Do not lean or support yourself on rolling objects, such as IV poles or bedside tables.     This information is not intended to replace advice given to you by your health care provider. Make sure you discuss any questions you have with your health care provider.     Document Released: 12/15/2001 Document Revised: 01/08/2016 Document Reviewed: 08/25/2013  [employer] Interactive Patient Education ©2016 [employer]            PATIENT/FAMILY/RESPONSIBLE PARTY VERBALIZES UNDERSTANDING OF ABOVE EDUCATION.  COPY OF PAIN SCALE GIVEN AND REVIEWED WITH VERBALIZED  UNDERSTANDING.

## 2020-09-10 NOTE — PROGRESS NOTES
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    SUBJECTIVE:  Since surgery, she has been doing relatively well.  Pain is minimal.  She denies fever, chills, bleeding, or drainage.      OBJECTIVE:  /68   Pulse 79   Temp 96.8 °F (36 °C) (Temporal)   Ht 152.4 cm (60\")   Wt 62.6 kg (138 lb)   BMI 26.95 kg/m²   The pedicle was rewrapped.  At the distal aspect of the nasal tip, there is healing epidermal lysis.  This was debrided.      ASSESSMENT:  Muriel was seen today for post-op.    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 Follow-up in 5 days for pedicle division and flap inset.     Please consider quitting tobacco use. Tobacco use can aggravate most ENT conditions we treat and can complicate or limit the effects of treatment of these conditions. Surgical outcomes are also affected by tobacco use including a higher risk of surgical complications. There are may options to assist the process of tobacco cessation including medicines, therapies and counseling. If you are interested in quitting, our office will help direct you to the best options to acheive this goal.    SHANNAN Knapp         "

## 2020-09-15 NOTE — ANESTHESIA PREPROCEDURE EVALUATION
Anesthesia Evaluation     Patient summary reviewed   no history of anesthetic complications:  NPO Solid Status: > 8 hours  NPO Liquid Status: > 2 hours           Airway   Mallampati: I  TM distance: >3 FB  Neck ROM: full  Dental    (+) edentulous, lower dentures and upper dentures    Pulmonary    (+) a smoker (1 ppd) Current Smoked day of surgery, COPD, asthma,home oxygen (prn),   (-) recent URI, sleep apnea  Cardiovascular   Exercise tolerance: poor (<4 METS) (Limited by SOB, back pain)    ECG reviewed  Patient on routine beta blocker and Beta blocker given within 24 hours of surgery    (+) hypertension,   (-) pacemaker, past MI, angina, cardiac stents    ROS comment: Loop recorder    Stress Test with MPS 7/27/20 - ·Raw images reviewed with the following abnormalities noted: scanned with arms to the side.  ·Left ventricular ejection fraction is normal (Calculated EF = 65%).  ·Myocardial perfusion imaging indicates a normal myocardial perfusion study with no evidence of ischemia.  ·Impressions are consistent with a low risk study.    TTE 7/27/20 - ·Estimated EF = 55%.  ·Moderate tricuspid valve regurgitation is present.  ·Right ventricular cavity is mildly dilated.  ·Mildly reduced right ventricular systolic function noted.  ·Abnormal global Longitudinal LV strain = -7.8%.  ·Moderate to severe pulmonary hypertension is present.    Neuro/Psych  (+) TIA (1988), syncope, psychiatric history Anxiety,     (-) seizures, CVA  GI/Hepatic/Renal/Endo    (+)  GERD,    (-) liver disease, no renal disease, diabetes, no thyroid disorder    Musculoskeletal     Abdominal    Substance History      OB/GYN          Other            Phys Exam Other: 02 by NC                  Anesthesia Plan    ASA 3     general     intravenous induction     Anesthetic plan, all risks, benefits, and alternatives have been provided, discussed and informed consent has been obtained with: patient.

## 2020-09-15 NOTE — ANESTHESIA POSTPROCEDURE EVALUATION
Patient: Muriel Hernandez    Procedure Summary     Date: 09/15/20 Room / Location:  [facility] OR 03 /  [facility]    Anesthesia Start: 1346 Anesthesia Stop: 1454    Procedures:       Pedicle division and flap inset (N/A )      flap inset (N/A ) Diagnosis:       Squamous cell cancer of skin of nose      (Squamous cell cancer of skin of nose [C44.321])    Surgeon: Jovan Trejo MD Provider: Daphney Vaughan CRNA    Anesthesia Type: general ASA Status: 3          Anesthesia Type: general    Vitals  Vitals Value Taken Time   /50 09/15/20 1535   Temp 98.2 °F (36.8 °C) 09/15/20 1520   Pulse 81 09/15/20 1535   Resp 12 09/15/20 1520   SpO2 99 % 09/15/20 1535   Vitals shown include unvalidated device data.        Post Anesthesia Care and Evaluation    Patient location during evaluation: PACU  Patient participation: complete - patient participated  Level of consciousness: awake and alert  Pain management: adequate  Airway patency: patent  Anesthetic complications: No anesthetic complications  PONV Status: none  Cardiovascular status: acceptable and hemodynamically stable  Respiratory status: acceptable  Hydration status: acceptable    Comments: Blood pressure 109/53, pulse 80, temperature 98.2 °F (36.8 °C), resp. rate 14, SpO2 97 %, not currently breastfeeding.    Patient discharged from PACU based upon Irene score. Please see RN notes for further details

## 2020-09-15 NOTE — ANESTHESIA PROCEDURE NOTES
Airway  Urgency: elective    Date/Time: 9/15/2020 1:52 PM  Airway not difficult    General Information and Staff    Patient location during procedure: OR    Indications and Patient Condition  Indications for airway management: airway protection    Preoxygenated: yes  Mask difficulty assessment: 2 - vent by mask + OA or adjuvant +/- NMBA    Final Airway Details  Final airway type: endotracheal airway      Successful airway: ETT  Cuffed: yes   Successful intubation technique: direct laryngoscopy  Facilitating devices/methods: intubating stylet  Endotracheal tube insertion site: oral  Blade: Rosey  Blade size: 3  ETT size (mm): 7.0  Cormack-Lehane Classification: grade I - full view of glottis  Placement verified by: chest auscultation and capnometry   Measured from: lips  ETT/EBT  to lips (cm): 20  Number of attempts at approach: 1  Assessment: lips, teeth, and gum same as pre-op and atraumatic intubation

## 2020-09-22 NOTE — TELEPHONE ENCOUNTER
RN spoke with patient and explained that the home monitor for her implanted heart monitor is disconnected from the network and needs reset.  RN provided patient with step by step instructions (verbal and written) on how to reset monitor.  RN also provided patient with RN's direct line if patient has any questions or concerns.  Understanding verbalized.

## 2020-09-23 NOTE — PROGRESS NOTES
"CC/Reason for visit: Iriwendy Sunshine Hernandez returns to the office following forehead flap pedicle division and flap inset on September 15, 2020.    SUBJECTIVE:  She is doing well.  She is without complaints.  She denies any nasal obstruction.    OBJECTIVE:  /78   Pulse 85   Temp 98 °F (36.7 °C)   Resp 20   Ht 152.4 cm (60\")   Wt 62.6 kg (138 lb)   BMI 26.95 kg/m²   Flap is healing well.  No evidence of infection or necrosis.  No evidence of recurrent lesion.    ASSESSMENT:  Muriel was seen today for post-op.    Diagnoses and all orders for this visit:    Squamous cell cancer of skin of nose    Tobacco abuse        PLAN:   [facility], Post-Procedure Instructions:    Protect the incisions from sunlight. Sunlight to the incisions will cause permanent pigmentation to the incision line and make the incision more noticeable. After the incision has reepithelialized (typically 2-3 weeks after the procedure), you may begin to use sunscreen with an SPF of 15 or greater    For the first week after your procedure, apply a thin coat of Vaseline to the incision 3-4 times daily.  Starting the second week, use a silicone-based wound cream to the incisions to optimize the end result. Apply topically twice daily, or as directed, to help optimize wound healing and decrease redness.    Due to COVID-19, we have decreased the amount of postoperative checks to help prevent added exposure to the virus.  If you have any questions or concerns following her procedure, please give us a call.  We have the ability to schedule a video visit, phone visit, or follow-up visit to address any concerns, while decreasing your exposure to the hospital.  Many of your questions and concerns may be able to be answered over the phone.  Our direct line is [phone].    It is very important to continue routine skin checks with a dermatologist or your PCP every 6-12 months.     Jovan Trejo MD   09/23/2020  11:42 CDT    "

## 2020-09-23 NOTE — PATIENT INSTRUCTIONS
CONTACT INFORMATION:  The main office phone number is [phone]. For emergencies after hours and on weekends, this number will convert over to our answering service and the on call provider will answer. Please try to keep non emergent phone calls/ questions to office hours 9am-5pm Monday through Friday.     [employer]  As an alternative, you can sign up and use the Epic MyChart system for more direct and quicker access for non emergent questions/ problems.  [facility] allows you to send messages to your doctor, view your test results, renew your prescriptions, schedule appointments, and more. To sign up, go to [employer] and click on the Sign Up Now link in the New User? box. Enter your [employer] Activation Code exactly as it appears below along with the last four digits of your Social Security Number and your Date of Birth () to complete the sign-up process. If you do not sign up before the expiration date, you must request a new code.    [employer] Activation Code: Activation code not generated  Current [employer] Status: Patient Declined    If you have questions, you can email [email or web address] or call [phone] to talk to our [employer] staff. Remember, [employer] is NOT to be used for urgent needs. For medical emergencies, dial 911.    IF YOU SMOKE OR USE TOBACCO PLEASE READ THE FOLLOWING:  Why is smoking bad for me?  Smoking increases the risk of heart disease, lung disease, vascular disease, stroke, and cancer. If you smoke, STOP!      For more information:  Smoke-Free [place]  866-QUIT-YES  http://www.smoke-free.[place].gov/sf_quit.htm

## 2020-09-25 NOTE — PROGRESS NOTES
Subjective   Muriel Hernandez, 1960, is a female who is being seen today for   Chief Complaint   Patient presents with   • Neurologic Problem     syncope       HISTORY OF PRESENT ILLNESS: Patient is a new patient seen for syncope.  Patient has had multiple episodes of syncope usually on standing were she has passed out completely seen by her ex- so go backwards and occasionally has hit her head or face.  Patient says she gets lightheaded almost routinely longstanding.  Today's blood pressure 100/60 left arm seated and 80/68 left arm standing with pulse 80.  Patient has had previous MRI of the brain with and without that showed chronic ischemic changes but no acute process.  There was some sinus fluid noted.  And Dr. Trejo had seen the patient for skin cancer over the nose and recently done surgery.  Patient has not hit her head since her MRI.  Ex- has noted that she will not be having any tonic-clonic activity or tongue biting or incontinence but will let be out 30 seconds to a minute with no postictal phase.  Patient says that sometimes she gets nauseated before passing out.  Holter has been done shows SVT with 19 episodes of fastest rate being 141.  She has PVCs.  Patient had a loop recorder placed by Dr. Nguyen.  Echocardiogram showed pulmonary hypertension with tricuspid regurgitation and EF of 55%.  Carotid showed less than 50% stenosis of anterograde vertebrals.  Patient is on O2 during the night and as necessary during the day.  Patient has heavy smoking history.  Patient's episode started about a year ago patient does not drive.  I warned patient about fall precautions and not to be up without assist the patient does live alone.  Safety precautions reviewed.  Patient on multiple medication that can affect her blood pressure    REVIEW OF SYSTEMS:   GENERAL: As above  PULMONARY: As above  CVS: As above  GASTROINTESTINAL: As above  GENITOURINARY: No acute  distress  GYN: As  "above  MUSCULOSKELETAL: Patient has chronic sciatica followed by PCP  HEENT: Patient complains of pain in the left eye with history of glaucoma and has not seen her eye doctor recently.  She denies any headaches otherwise  ENDOCRINE: Patient not diabetic but says she does not eat well  PSYCHIATRIC: No acute psychiatric process  HEMATOLOGY: Patient anemic.  Patient had with BMP on 9/10 sodium of 125 and has had somewhat low sodiums previously.  No one previously discussed the 125 sodium  SKIN: As above  Family history reviewed and otherwise noncontributory to this problem  Social history: Patient does smoke and does use alcohol but denies drug use.    PHYSICAL EXAMINATION:    GENERAL: Patient unsteady on standing and has wide-based gait.  With Romberg tends to veer backwards  CRANIUM: STATUS post surgery to her frontal area with skin removal otherwise no evidence of acute trauma and normocephalic  HEENT:       EYES: EOMs intact without nystagmus and fields full to confrontation.  Pupils equal round reactive to light.  No acute fundic abnormalities.       EARS: Tympanic membranes normal and hears tuning fork bilaterally       THROAT: No acute oropharynx abnormalities       NECK: No bruits/no lymphadenopathy  CHEST: No acute cardiopulmonary abnormalities noted by auscultation except wheezing in the bases bilaterally  ABDOMEN: Nondistended  EXTREMITIES: 1+ pitting edema.  Dorsalis pedis pulse symmetrical  NEURO: Patient alert and follows commands without difficulty  SPEECH: Normal    CRANIAL NERVES: Motor/sensory about the face shows patchy areas of decreased pin sensation and patient says she has \"numb spots on her face\"    MOTOR STRENGTH: Motor strength upper and lower extremities grossly intact  STATION AND GAIT: As above  CEREBELLAR: Finger-nose and heel-to-shin normal  SENSORY: Patient has decreased pin and vibration distal proximal in the lower extremities to midcalf bilaterally  REFLEXES: Reflexes absent " throughout without clonus or Babinski      ASSESSMENT AND PLAN: Patient with history of syncope and orthostasis and is to be taking her blood pressures regularly sitting and standing.  Patient to go to urgent care today and I have called urgent care to be aware of the patient's symptomatology and to evaluate for orthostasis and hyponatremia.  Patient not to be up without assist.  Patient not to be driving and safety precautions reviewed.  Fall precautions reviewed and patient to get to emergency room if further episodes occur.  Patient is to follow-up with PCP.  Patient is to get EEG set up.Patient's Body mass index is 27.11 kg/m². BMI is within normal parameters. No follow-up required..      Muriel was seen today for neurologic problem.    Diagnoses and all orders for this visit:    Syncope and collapse  -     EEG; Future    Orthostasis    Hyponatremia        Dictated utilizing Dragon voice recognition software

## 2020-09-25 NOTE — PATIENT INSTRUCTIONS
Patient not to be driving or climbing or using sharp cutting tools.  Patient not to be up without assist.  Patient to take blood pressures sitting and standing twice daily and keep diary for PCP and cardiologist.  Fall precautions.  Patient have caution working around hot fire/stove/grill/water.  Patient to get with ophthalmologist about her pain in the left eye and glaucoma history/ patient to go to urgent care today for evaluation and Dr. Alicea ,1 PM on Monday next week

## 2020-09-25 NOTE — ED PROVIDER NOTES
"Subjective   This patient is a 60-year-old female with a history of hypertension treated with losartan/hydrochlorothiazide and Bumex as needed.  3 months ago she had an episode of syncope.  During a couple of medical encounters today she ended up being referred to the ED because of concerns of hypotension.  The patient herself is symptom-free, has no chest pain shortness of breath, fever or other systemic symptoms.  She has had no change in her diet, and has not had any recent diarrhea or any other circumstance where she might be dehydrated.          Review of Systems   All other systems reviewed and are negative.      Past Medical History:   Diagnosis Date   • Anxiety    • Arthritis    • Asthma    • Cancer (CMS/HCC)     skin   • Chronic back pain    • COPD (chronic obstructive pulmonary disease) (CMS/HCC)    • GERD (gastroesophageal reflux disease)    • Glaucoma    • Hypertension    • IBS (irritable bowel syndrome)    • Neoplasm of uncertain behavior     nasal tip   • On home oxygen therapy    • Skin cancer    • TIA (transient ischemic attack)    • UTI (urinary tract infection)        Allergies   Allergen Reactions   • Sulfa Antibiotics Hives   • Local [Lidocaine] Other (See Comments)     \"novocaine\" causes sneezing       Past Surgical History:   Procedure Laterality Date   • BREAST SURGERY Left 1982    NO LYMPH NODES REMOVED   • CERVICAL FUSION     • COLONOSCOPY     • FLAP HEAD/NECK N/A 9/15/2020    Procedure: flap inset;  Surgeon: Jovan Trejo MD;  Location: [facility] OR;  Service: ENT;  Laterality: N/A;   • HEAD/NECK LESION/CYST EXCISION Bilateral 8/4/2020    Procedure: 1) 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, 3.2 cm x 3.2 cm   2) paramedian forehead flap reconstruction of nasal tip with preservation of vascular pedicle   3) complex closure skin of forehead and scalp, 10.0 cm;  Surgeon: Jovan Trejo MD;  Location: [facility] OR;  Service: ENT;  Laterality: Bilateral;   • HEAD/NECK " LESION/CYST EXCISION N/A 9/15/2020    Procedure: Pedicle division and flap inset;  Surgeon: Jovan Trejo MD;  Location: [facility] OR;  Service: ENT;  Laterality: N/A;   • HYSTERECTOMY     • KNEE SURGERY     • NOSE SURGERY         Family History   Problem Relation Age of Onset   • Leukemia Mother    • Breast cancer Mother    • Colon cancer Mother    • Skin cancer Father    • Prostate cancer Father    • Cancer Brother    • Cancer Maternal Grandfather    • Cancer Paternal Grandmother        Social History     Socioeconomic History   • Marital status:      Spouse name: Not on file   • Number of children: Not on file   • Years of education: Not on file   • Highest education level: Not on file   Tobacco Use   • Smoking status: Current Every Day Smoker     Packs/day: 1.00     Years: 50.00     Pack years: 50.00     Types: Cigarettes   • Smokeless tobacco: Never Used   Substance and Sexual Activity   • Alcohol use: Yes     Comment: OCASSIONALY   • Drug use: Never   • Sexual activity: Defer           Objective   Physical Exam  Vitals signs and nursing note reviewed.   Constitutional:       Appearance: She is well-developed.   HENT:      Head: Normocephalic.      Comments: The patient has evidence of surgery to remove skin cancers  Eyes:      Conjunctiva/sclera: Conjunctivae normal.   Neck:      Musculoskeletal: Normal range of motion and neck supple.   Cardiovascular:      Rate and Rhythm: Normal rate and regular rhythm.      Heart sounds: Normal heart sounds.   Pulmonary:      Effort: Pulmonary effort is normal.      Breath sounds: Normal breath sounds.   Abdominal:      General: Bowel sounds are normal.      Palpations: Abdomen is soft.   Musculoskeletal: Normal range of motion.   Skin:     General: Skin is warm and dry.      Capillary Refill: Capillary refill takes less than 2 seconds.   Neurological:      Mental Status: She is alert and oriented to person, place, and time.   Psychiatric:         Behavior:  Behavior normal.         Thought Content: Thought content normal.         Judgment: Judgment normal.         Procedures           ED Course                                           MDM  Number of Diagnoses or Management Options     Amount and/or Complexity of Data Reviewed  Clinical lab tests: reviewed and ordered  Tests in the radiology section of CPT®: reviewed and ordered  Tests in the medicine section of CPT®: reviewed and ordered  Decide to obtain previous medical records or to obtain history from someone other than the patient: yes    Patient Progress  Patient progress: stable      Final diagnoses:   Hypotension, unspecified hypotension type   Hyponatremia     The patient's work-up revealed significant hyponatremia down to 121.  This point the treatment for the presumed tension had given her a liter of fluid and a recheck of her sodium after that showed a sodium of 126.  Looking back through her chart she is chronically hyponatremic and is currently asymptomatic from it.  She insists he wants to go home which I am okay with as she is otherwise stable.  I did advise her to talk to her doctor early next week to see if the hydrochlorothiazide at least could be taken out of her medication regimen is think this might be contributing to the hyponatremia.  She understands to return to the ED for any other concern.       Marques Prado MD  09/25/20 [id number]

## 2020-11-12 PROBLEM — A41.9 SEVERE SEPSIS (HCC): Status: ACTIVE | Noted: 2020-01-01

## 2020-11-12 PROBLEM — R65.20 SEVERE SEPSIS (HCC): Status: ACTIVE | Noted: 2020-01-01

## 2020-11-12 NOTE — ED PROVIDER NOTES
"Subjective   60-year-old female presents to the ER for evaluation after being found unresponsive, lying on her floor at home.  History of hypertension, COPD, chronic pain syndrome, hyponatremia.  911 was called after family went to go visit the patient after not speak to her for the past few days.  They found the patient lying on the floor minimally responsive.  It is unclear how long the patient was lying on the floor.  She is unable provide any history.  She does localize pain in all 4 extremities and attempts to answer some questions, opens her eyes spontaneously.  Initial temp 90.6 degrees Fahrenheit.  Patient with increased work of breathing/borderline agonal respirations.  She has periorbital swelling and bruising, right knee swelling and bruising.  No additional formation able to be obtained.      History limited by:  Mental status change      Review of Systems   Unable to perform ROS: Mental status change       Past Medical History:   Diagnosis Date   • Anxiety    • Arthritis    • Asthma    • Cancer (CMS/HCC)     skin   • Chronic back pain    • COPD (chronic obstructive pulmonary disease) (CMS/HCC)    • GERD (gastroesophageal reflux disease)    • Glaucoma    • Hypertension    • IBS (irritable bowel syndrome)    • Neoplasm of uncertain behavior     nasal tip   • On home oxygen therapy    • Skin cancer    • TIA (transient ischemic attack)    • UTI (urinary tract infection)        Allergies   Allergen Reactions   • Sulfa Antibiotics Hives   • Local [Lidocaine] Other (See Comments)     \"novocaine\" causes sneezing       Past Surgical History:   Procedure Laterality Date   • BREAST SURGERY Left 1982    NO LYMPH NODES REMOVED   • CERVICAL FUSION     • COLONOSCOPY     • FLAP HEAD/NECK N/A 9/15/2020    Procedure: flap inset;  Surgeon: Jovan Trejo MD;  Location: [facility];  Service: ENT;  Laterality: N/A;   • HEAD/NECK LESION/CYST EXCISION Bilateral 8/4/2020    Procedure: 1) 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, 3.2 cm x 3.2 cm   2) paramedian forehead flap reconstruction of nasal tip with preservation of vascular pedicle   3) complex closure skin of forehead and scalp, 10.0 cm;  Surgeon: Jovan Trejo MD;  Location: [facility] OR;  Service: ENT;  Laterality: Bilateral;   • HEAD/NECK LESION/CYST EXCISION N/A 9/15/2020    Procedure: Pedicle division and flap inset;  Surgeon: Jovan Trejo MD;  Location: [facility] OR;  Service: ENT;  Laterality: N/A;   • HYSTERECTOMY     • KNEE SURGERY     • NOSE SURGERY         Family History   Problem Relation Age of Onset   • Leukemia Mother    • Breast cancer Mother    • Colon cancer Mother    • Skin cancer Father    • Prostate cancer Father    • Cancer Brother    • Cancer Maternal Grandfather    • Cancer Paternal Grandmother        Social History     Socioeconomic History   • Marital status:      Spouse name: Not on file   • Number of children: Not on file   • Years of education: Not on file   • Highest education level: Not on file   Tobacco Use   • Smoking status: Current Every Day Smoker     Packs/day: 1.00     Years: 50.00     Pack years: 50.00     Types: Cigarettes   • Smokeless tobacco: Never Used   Substance and Sexual Activity   • Alcohol use: Yes     Comment: OCASSIONALY   • Drug use: Never   • Sexual activity: Defer           Objective   Physical Exam  Constitutional:       General: She is in acute distress.      Appearance: She is ill-appearing and toxic-appearing.      Comments: Minimally responsive elderly female who appears older than stated age   HENT:      Head:      Comments: No cranial trauma, does have periorbital swelling and some bruising  Eyes:      Extraocular Movements: Extraocular movements intact.      Pupils: Pupils are equal, round, and reactive to light.   Neck:      Musculoskeletal: Normal range of motion and neck supple. No neck rigidity.      Meningeal: Brudzinski's sign and Kernig's sign absent.   Cardiovascular:      Rate  and Rhythm: Normal rate and regular rhythm.      Heart sounds: Normal heart sounds.   Pulmonary:      Breath sounds: Rhonchi and rales present. No wheezing.      Comments: Increased work of breathing, decreased air movement bilaterally  Abdominal:      Comments: Abdomen soft but diffusely tender.   Musculoskeletal:         General: Swelling and tenderness present.      Comments: Right knee swollen, contused and tender, decreased range of motion due to pain.  Bilateral hips nontender to palpation with full range of motion   Skin:     Capillary Refill: Capillary refill takes 2 to 3 seconds.      Comments: Skin cool and dry, periorbital contusions, right knee contusion, no definite skin breakdown   Neurological:      Mental Status: She is lethargic and disoriented.      GCS: GCS eye subscore is 4. GCS verbal subscore is 3. GCS motor subscore is 5.      Cranial Nerves: Dysarthria present. No cranial nerve deficit or facial asymmetry.      Sensory: No sensory deficit.         Intubation    Date/Time: 11/12/2020 4:45 PM  Performed by: Atif Mckoy MD  Authorized by: Atif Mckoy MD     Consent:     Consent obtained:  Emergent situation  Pre-procedure details:     Patient status:  Altered mental status    Pretreatment medications:  None    Paralytics:  Rocuronium  Procedure details:     Preoxygenation:  Nasal cannula    CPR in progress: no      Intubation method:  Oral    Oral intubation technique:  Video-assisted    Laryngoscope blade:  Mac 3    Tube size (mm):  7.5    Tube type:  Cuffed    Number of attempts:  1  Placement assessment:     ETT to teeth:  23    Tube secured with:  ETT quevedo    Breath sounds:  Equal    Placement verification: chest rise, condensation, direct visualization, equal breath sounds and ETCO2 detector      CXR findings:  ETT in proper place  Post-procedure details:     Patient tolerance of procedure:  Tolerated well, no immediate complications  ECG 12 Lead      Date/Time:  11/12/2020 5:27 PM  Performed by: Atif Mckoy MD  Authorized by: Atif Mckoy MD   Interpreted by physician  Rhythm: sinus rhythm  Rate: normal  QRS axis: normal  ST Segments: ST segments normal  T Waves: T waves normal  Other findings: prolonged QTc interval                 ED Course  ED Course as of Nov 12 2018   Thu Nov 12, 2020   1727 XR Chest 1 View [AW]      ED Course User Index  [AW] Atif Mckoy MD      Labs Reviewed   BLOOD GAS, ARTERIAL - Abnormal; Notable for the following components:       Result Value    pH, Arterial 7.301 (*)     pCO2, Arterial 47.5 (*)     pO2, Arterial 56.0 (*)     Base Excess, Arterial -3.0 (*)     O2 Saturation, Arterial 83.5 (*)     pCO2, Temperature Corrected 47.5 (*)     pH, Temp Corrected 7.301 (*)     pO2, Temperature Corrected 56.0 (*)     All other components within normal limits   COMPREHENSIVE METABOLIC PANEL - Abnormal; Notable for the following components:    Glucose 179 (*)     BUN 29 (*)     Creatinine 0.50 (*)     ALT (SGPT) 45 (*)     AST (SGOT) 169 (*)     Alkaline Phosphatase 134 (*)     Total Bilirubin 2.5 (*)     BUN/Creatinine Ratio 58.0 (*)     All other components within normal limits    Narrative:     GFR Normal >60  Chronic Kidney Disease <60  Kidney Failure <15     LACTIC ACID, PLASMA - Abnormal; Notable for the following components:    Lactate 5.9 (*)     All other components within normal limits   URINALYSIS W/ CULTURE IF INDICATED - Abnormal; Notable for the following components:    Ketones, UA Trace (*)     Blood, UA Moderate (2+) (*)     All other components within normal limits   PROTIME-INR - Abnormal; Notable for the following components:    Protime 29.4 (*)     INR 2.78 (*)     All other components within normal limits   C-REACTIVE PROTEIN - Abnormal; Notable for the following components:    C-Reactive Protein 3.52 (*)     All other components within normal limits   URINE DRUG SCREEN - Abnormal; Notable for the  following components:    Opiate Screen Positive (*)     Benzodiazepine Screen, Urine Positive (*)     All other components within normal limits    Narrative:     Cutoff For Drugs Screened:    Amphetamines               500 ng/ml  Barbiturates               200 ng/ml  Benzodiazepines            150 ng/ml  Cocaine                    150 ng/ml  Methadone                  200 ng/ml  Opiates                    100 ng/ml  Phencyclidine               25 ng/ml  THC                            50 ng/ml  Methamphetamine            500 ng/ml  Tricyclic Antidepressants  300 ng/ml  Oxycodone                  100 ng/ml  Propoxyphene               300 ng/ml  Buprenorphine               10 ng/ml    The normal value for all drugs tested is negative. This report includes unconfirmed screening results, with the cutoff values listed, to be used for medical treatment purposes only.  Unconfirmed results must not be used for non-medical purposes such as employment or legal testing.  Clinical consideration should be applied to any drug of abuse test, particularly when unconfirmed results are used.     CBC WITH AUTO DIFFERENTIAL - Abnormal; Notable for the following components:    Hemoglobin 9.6 (*)     Hematocrit 31.0 (*)     MCH 25.3 (*)     MCHC 31.0 (*)     RDW 21.4 (*)     RDW-SD 59.2 (*)     Platelets 73 (*)     Neutrophil % 85.4 (*)     Lymphocyte % 4.3 (*)     Eosinophil % 0.1 (*)     Neutrophils, Absolute 7.02 (*)     Lymphocytes, Absolute 0.35 (*)     All other components within normal limits   CK - Abnormal; Notable for the following components:    Creatine Kinase 1,525 (*)     All other components within normal limits   URINALYSIS, MICROSCOPIC ONLY - Abnormal; Notable for the following components:    RBC, UA 6-12 (*)     All other components within normal limits   INFLUENZA ANTIGEN, RAPID - Normal    Narrative:     Recommend confirmation of negative results by viral culture or molecular assay.   COVID-19,ABBOTT IN-HOUSE,NP SWAB  (NO TRANSPORT MEDIA) 2 HR TAT - Normal    Narrative:     Fact sheet for providers: https://www.fda.gov/media/145979/download     Fact sheet for patients: https://www.fda.gov/media/608860/download   AMMONIA - Normal   MAGNESIUM - Normal   ACETAMINOPHEN LEVEL - Normal   SALICYLATE LEVEL - Normal   COVID PRE-OP / PRE-PROCEDURE SCREENING ORDER (NO ISOLATION)    Narrative:     The following orders were created for panel order COVID PRE-OP / PRE-PROCEDURE SCREENING ORDER (NO ISOLATION) - Swab, Nasal Cavity.  Procedure                               Abnormality         Status                     ---------                               -----------         ------                     COVID-19, ABBOTT IN-HOUS...[[id number]]  Normal              Final result                 Please view results for these tests on the individual orders.   BLOOD CULTURE   BLOOD CULTURE   BLOOD GAS, ARTERIAL   RAINBOW DRAW    Narrative:     The following orders were created for panel order [place] Draw.  Procedure                               Abnormality         Status                     ---------                               -----------         ------                     Light Blue Top[[id number]]                                   Final result               Green Top (Gel)[[id number]]                                  Final result               Lavender Top[[id number]]                                     Final result               Red Top[[id number]]                                          Final result               [place] Blood Culture Mal...[[id number]]                      Final result               Gray Top - Ice[[id number]]                                   Final result                 Please view results for these tests on the individual orders.   GRAY TOP - ICE   ETHANOL    Narrative:     Not for legal purposes. Chain of Custody not followed.    LACTIC ACID REFLEX TIMER   LACTIC ACID, REFLEX   APTT   FIBRINOGEN   D-DIMER, QUANTITATIVE    ANTITHROMBIN III   PROTEIN C AND S, FUNCTIONAL   LIPASE   LIGHT BLUE TOP   GREEN TOP   LAVENDER TOP   RED TOP   RAINBOW BLOOD CULTURE BOTTLES - 1 SET   CBC AND DIFFERENTIAL    Narrative:     The following orders were created for panel order CBC & Differential.  Procedure                               Abnormality         Status                     ---------                               -----------         ------                     CBC Auto Differential[[id number]]        Abnormal            Final result                 Please view results for these tests on the individual orders.          Ct Abdomen Pelvis Without Contrast    Result Date: 11/12/2020  EXAMINATION: CT ABDOMEN PELVIS WO CONTRAST-   11/12/2020 6:36 PM CST  HISTORY: sepsis, abd pain  In order to have a CT radiation dose as low as reasonably achievable Automated Exposure Control was utilized for adjustment of the mA and/or KV according to patient size.  DLP in mGycm= 839.  Noncontrast abdomen pelvis CT.  Normal noncontrast appearance of the liver, gallbladder, pancreas, spleen, adrenal glands, and kidneys. No renal stone or hydronephrosis. Urinary bladder catheter present.  No bowel dilation. No peritoneal fluid.  No bowel obstruction and no free air.  Patchy abdominal wall and mesenteric edema noted.  Summary: 1. No mass, abscess, or bowel obstruction.            This report was finalized on 11/12/2020 19:38 by Dr. Tay Crisostomo MD.    Xr Knee 1 Or 2 View Right    Result Date: 11/12/2020  EXAMINATION: XR KNEE 1 OR 2 VW RIGHT-  11/12/2020 4:58 PM CST  HISTORY: Right knee pain and swelling.  Right knee, 2 views.  Extensive chondrocalcinosis of the tibiofemoral compartments.  Intact distal femur and proximal tibia and fibula. Intact patella.  No significant joint effusion.  Summary: 1. Osteoarthritic joint changes. 2. No acute bony abnormality.  This report was finalized on 11/12/2020 17:03 by Dr. Tay Crisostomo MD.    Ct Head Without Contrast    Result  Date: 11/12/2020  EXAMINATION: CT HEAD WO CONTRAST-   11/12/2020 6:36 PM CST  HISTORY: Mental status change, unknown cause  In order to have a CT radiation dose as low as reasonably achievable Automated Exposure Control was utilized for adjustment of the mA and/or KV according to patient size.  DLP in mGycm= 640.  Noncontrast head CT compared with a brain MRI exam from 6/10/2020.  Axial, sagittal, and coronal noncontrast CT imaging of the head.  The visualized paranasal sinuses are clear.  The brain and ventricles have an age appropriate appearance. There is no hemorrhage or mass-effect. No acute infarction is seen.  No calvarial abnormality.      1. No acute intracranial abnormality is seen.              This report was finalized on 11/12/2020 19:24 by Dr. Tay Crisostomo MD.    Ct Chest Without Contrast    Result Date: 11/12/2020  EXAMINATION: CT CHEST WO CONTRAST-   11/12/2020 6:36 PM CST  HISTORY: Respiratory failure. Fall injury.  In order to have a CT radiation dose as low as reasonably achievable Automated Exposure Control was utilized for adjustment of the mA and/or KV according to patient size.  DLP in mGycm= 314.  Heart size is at the upper limits of normal. Aortic calcification with no aneurysm.  Low-lying endotracheal tube with the tip 12 mm above the avery.  Granulomatous lymph node calcification within the middle mediastinum and at the right hilum.  Bilateral infiltrate for which pneumonia is favored over pulmonary edema. No pneumothorax and no significant pleural effusion.  Summary: 1. Bilateral pneumonia. 2. Somewhat low-lying endotracheal tube with the tip 12 mm above the avery.            This report was finalized on 11/12/2020 19:36 by Dr. Tay Crisostomo MD.    Ct Cervical Spine Without Contrast    Result Date: 11/12/2020  EXAMINATION: CT CERVICAL SPINE WO CONTRAST-   11/12/2020 6:53 PM CST  HISTORY: Fall injury. Head and neck trauma.  In order to have a CT radiation dose as low as reasonably  achievable Automated Exposure Control was utilized for adjustment of the mA and/or KV according to patient size.  DLP in mGycm= 374.  Axial, sagittal, and coronal noncontrast CT imaging.  Endotracheal tube present.  Vertebral bodies and posterior elements are intact.  Reconstructed sagittal images show normal curvature. There is no malalignment. Prevertebral soft tissues are normal. Facet joints align normally.  Reconstructed coronal images show normal lateral mass alignment.  C4-5, C5-6, and C6-7 discectomy with anterior plate and screw fusion. Uncinate spurring with mild foraminal narrowing at C5-6 and C6-7.      1. No acute fracture.  .            This report was finalized on 11/12/2020 19:33 by Dr. Tay Crisostomo MD.    Xr Chest 1 View    Result Date: 11/12/2020  EXAMINATION: XR CHEST 1 VW-  11/12/2020 4:49 PM CST  HISTORY: Simple Sepsis Protocol  1 view chest x-ray compared with 9/25/2020.  Endotracheal tube is present. The tip lies 18 mm above the avery.  Magnified heart size.  Interstitial lung disease with patchy bilateral infiltrate most prominent in the left perihilar region.  Summary: 1. Endotracheal tube present. The tip lies 18 mm above the avery. 2. Patchy bilateral pneumonia. This report was finalized on 11/12/2020 17:02 by Dr. Tay Crisostomo MD.    Ct Maxillofacial Without Contrast    Result Date: 11/12/2020  EXAMINATION: CT MAXILLOFACIAL WO CONT-   11/12/2020 6:36 PM CST  HISTORY: Fall injury. Head and face trauma.  In order to have a CT radiation dose as low as reasonably achievable Automated Exposure Control was utilized for adjustment of the mA and/or KV according to patient size.  DLP in mGycm= 158.  Endotracheal tube present.  Intact mandible and maxilla. Intact nasal bones and zygomatic arches.  Bony orbits are intact.  Paranasal sinuses are essentially clear.  Summary: 1. No facial bone fracture.            This report was finalized on 11/12/2020 19:34 by Dr. Tay Crisostomo MD.                                   MDM  60-year-old female with history of hypertension, COPD, chronic pain, anxiety presents to the ER with altered mental status, hypoxia, hypothermia.    Sepsis work-up initiated.  Patient was having labored breathing/near agonal respirations.  Decreased level of consciousness.  Patient was intubated due to concern for imminent respiratory failure.  ABG revealed hypoxemia.    She received Vanco and cefepime for sepsis of uncertain etiology.  Lactate 5.9.  CRP elevated to 3.5.  She received 30 mill per KG fluid bolus.    Chest x-ray shows bilateral pneumonia.  CT head, max face, C-spine, chest abdomen pelvis negative for acute process.    Sodium normal.  LFTs abnormal, INR 2.8.  DIC labs sent and pending.    Additional labs revealed elevated CK total.  UDS positive for opiates and benzos.  Tylenol, salicylate and alcohol negative.    Patient remains hypothermic.  We will continue external warmer, admit to the ICU for inpatient treatment.    Final diagnoses:   Severe sepsis (CMS/HCC)   Pneumonia of both lungs due to infectious organism, unspecified part of lung   Acute respiratory failure with hypoxia (CMS/HCC)   Non-traumatic rhabdomyolysis   Coagulopathy (CMS/HCC)            Atif Mckoy MD  11/12/20 2022

## 2020-11-13 PROBLEM — J96.01 ACUTE RESPIRATORY FAILURE WITH HYPOXIA (HCC): Status: ACTIVE | Noted: 2020-01-01

## 2020-11-13 PROBLEM — T68.XXXA HYPOTHERMIA: Status: ACTIVE | Noted: 2020-01-01

## 2020-11-13 PROBLEM — R74.01 TRANSAMINITIS: Status: ACTIVE | Noted: 2020-01-01

## 2020-11-13 PROBLEM — J18.9 BILATERAL PNEUMONIA: Status: ACTIVE | Noted: 2020-01-01

## 2020-11-13 PROBLEM — D69.6 THROMBOCYTOPENIA (HCC): Status: ACTIVE | Noted: 2020-01-01

## 2020-11-13 NOTE — CONSULTS
"    PULMONARY AND CRITICAL CARE CONSULT - [facility]    Muriel Hernandez   MR# [id number]  Acct# [id number]  11/13/2020   07:19 CST    Referring Provider: Rodolfo Menendez MD    Chief Complaint: Mechanically ventilated    HPI: We are consulted by Rodolfo Menendez MD to see this 60 y.o. female born on 1960.  Patient was found down at home yesterday and covered in feces.  Per nursing report, she had agonal breathing and was hypothermic.  She was intubated and remains on the ventilator this morning with fentanyl infusing.  She had previously had propofol but it is now off.  She had a run of SVT this morning.  Her nurse Dang reports that she will withdraw to pain but does not follow commands.  Chest x-ray shows some patchy bilateral infiltrates and she has an elevated lactate level.  She also has elevated troponin, creatinine kinase and positive myoglobin.  She has a long smoking history with reported COPD.  She has DuoNebs, Singulair and oxygen listed on her home med list.  She is unable to provide any of her own information and there is no family at bedside.    Past Medical History   has a past medical history of Anxiety, Arthritis, Asthma, Cancer (CMS/HCC), Chronic back pain, COPD (chronic obstructive pulmonary disease) (CMS/HCC), GERD (gastroesophageal reflux disease), Glaucoma, Hypertension, IBS (irritable bowel syndrome), Neoplasm of uncertain behavior, On home oxygen therapy, Skin cancer, TIA (transient ischemic attack), and UTI (urinary tract infection).   has a past surgical history that includes Knee surgery; Colonoscopy; Hysterectomy; Cervical fusion; Breast surgery (Left, 1982); Head/Neck Lesion/Cyst Excision (Bilateral, 8/4/2020); Nose surgery; Head/Neck Lesion/Cyst Excision (N/A, 9/15/2020); and Flap Head/Neck (N/A, 9/15/2020).  Allergies   Allergen Reactions   • Sulfa Antibiotics Hives   • Local [Lidocaine] Other (See Comments)     \"novocaine\" causes sneezing     "     Medications  cefepime, 2 g, Intravenous, Q8H  chlorhexidine, 15 mL, Mouth/Throat, Q12H  famotidine, 20 mg, Intravenous, BID  ipratropium-albuterol, 3 mL, Nebulization, Q4H - RT  sodium chloride, 10 mL, Intravenous, Q12H  vancomycin, 1,250 mg, Intravenous, Q12H      dexmedetomidine, 0.2-1.5 mcg/kg/hr  fentanyl, 0.7 mcg/kg/hr, Last Rate: 0.7 mcg/kg/hr (11/12/20 2259)  lactated ringers, 125 mL/hr, Last Rate: 125 mL/hr (11/12/20 2208)  propofol, 5-50 mcg/kg/min, Last Rate: 20 mcg/kg/min (11/13/20 0638)      Social History   reports that she has been smoking cigarettes. She has a 50.00 pack-year smoking history. She has never used smokeless tobacco. She reports current alcohol use. She reports that she does not use drugs.    Family History  family history includes Breast cancer in her mother; Cancer in her brother, maternal grandfather, and paternal grandmother; Colon cancer in her mother; Leukemia in her mother; Prostate cancer in her father; Skin cancer in her father.    Review of Systems:  Cannot obtain due to mechanical ventilation.  The patient notably is critically ill and connected to a ventilator.  As such patient cannot communicate and provide any history whatsoever, including any history of present illness or interval history since arrival or review of systems. The interested reviewer may note this fact, as an attempt has been made at collecting and documenting these portions of the patient history, but this information is unobtainable despite attempted review and therefore cannot be documented at this time.     Physical Exam:  Temp:  [90.1 °F (32.3 °C)-98.6 °F (37 °C)] 98.6 °F (37 °C)  Heart Rate:  [] 117  Resp:  [15-29] 17  BP: ()/() 105/65  FiO2 (%):  [30 %] 30 %    Intake/Output Summary (Last 24 hours) at 11/13/2020 0719  Last data filed at 11/13/2020 0400  Gross per 24 hour   Intake 1024.55 ml   Output 2075 ml   Net -1050.45 ml         11/12/20  2124 11/13/20  0200   Weight: 66 kg  (145 lb 9.6 oz) 65.7 kg (144 lb 12.8 oz)     SpO2 Percentage    11/13/20 0600 11/13/20 0651 11/13/20 0700   SpO2: 95% 95% 100%     Physical Exam  Vitals signs and nursing note reviewed.   Constitutional:       General: She is not in acute distress.     Appearance: She is well-developed.      Interventions: She is sedated, intubated and restrained.      Comments: Frail-appearing, appears older than stated age.   HENT:      Head: Normocephalic and atraumatic.      Mouth/Throat:      Comments: ET tube in place, OG in place  Eyes:      Pupils: Pupils are equal, round, and reactive to light.   Cardiovascular:      Rate and Rhythm: Regular rhythm. Tachycardia present.   Pulmonary:      Effort: She is intubated.      Breath sounds: Rhonchi (Coarse, throughout) present.      Comments: No ventilator dyssynchrony  Abdominal:      Palpations: Abdomen is soft.   Musculoskeletal:      Right lower leg: No edema.      Left lower leg: No edema.   Skin:     General: Skin is warm and dry.      Comments: Warmer in place   Neurological:      Mental Status: She is unresponsive.      Comments: Sedated and intubated   Psychiatric:      Comments: Sedated and intubated       Ventilator Settings:        Resp Rate (Set): 16     FiO2 (%): 30 %  PEEP/CPAP (cm H2O): 5 cm H20  Minute Ventilation (L/min) (Obs): 12.9 L/min  Resp Rate (Observed) Vent: 16     I:E Ratio (Obs): 1:1.3  PIP Observed (cm H2O): 19 cm H2O  Plateau Pressure (cm H2O): 37 cm H2O     Results from last 7 days   Lab Units 11/13/20  0340 11/12/20  1611   WBC 10*3/mm3 8.31 8.22   HEMOGLOBIN g/dL 8.7* 9.6*   PLATELETS 10*3/mm3 64* 73*     Results from last 7 days   Lab Units 11/13/20  0340 11/12/20  1611   SODIUM mmol/L 145 139   POTASSIUM mmol/L 3.8 4.2   CO2 mmol/L 26.0 25.0   BUN mg/dL 22 29*   CREATININE mg/dL 0.42* 0.50*   MAGNESIUM mg/dL  --  2.0   PHOSPHORUS mg/dL 1.5*  --    GLUCOSE mg/dL 97 179*     Results from last 7 days   Lab Units 11/13/20  0405 11/12/20  4135  11/12/20  1550   PH, ARTERIAL pH units 7.424 7.309* 7.301*   PCO2, ARTERIAL mm Hg 38.5 47.9* 47.5*   PO2 ART mm Hg 63.4* 83.1 56.0*   FIO2 % 30 30 21     No results found for: BLOODCX, URINECX, WOUNDCX, MRSACX, RESPCX, STOOLCX  No results found for: PROBNP  Recent radiology:   Imaging Results (Last 72 Hours)     Procedure Component Value Units Date/Time    XR Abdomen KUB [[id number]] Collected: 11/13/20 0713     Updated: 11/13/20 0717    Narrative:      EXAMINATION: XR ABDOMEN KUB-  11/13/2020 7:13 AM CST     HISTORY: OG placement; A41.9-Sepsis, unspecified organism; R65.20-Severe  sepsis without septic shock; J18.9-Pneumonia, unspecified organism;  J96.01-Acute respiratory failure with hypoxia; M62.82-Rhabdomyolysis;  D68.9-Coagulation defect, unspecified      COMPARISON: 11/13/2020     FINDINGS:  Limited exam for evaluation of the enteric tube placement. The tube tip  projects over the body of the stomach. Endotracheal tube tip is  unchanged from the previous chest x-ray.     Evaluation for free intraperitoneal air is limited on a supine  radiograph, but there are no definite findings of free intraperitoneal  air.      The osseous structures demonstrate no acute abnormality.           Impression:         1.  Enteric tube tip projects over the stomach.         This report was finalized on 11/13/2020 07:14 by Dr. Martinez Hernandez MD.    XR Chest 1 View [[id number]] Resulted: 11/13/20 0349     Updated: 11/13/20 0349    CT Abdomen Pelvis Without Contrast [[id number]] Collected: 11/12/20 1936     Updated: 11/12/20 1941    Narrative:      EXAMINATION: CT ABDOMEN PELVIS WO CONTRAST-      11/12/2020 6:36 PM CST     HISTORY: sepsis, abd pain     In order to have a CT radiation dose as low as reasonably achievable  Automated Exposure Control was utilized for adjustment of the mA and/or  KV according to patient size.     DLP in mGycm= 839.     Noncontrast abdomen pelvis CT.     Normal noncontrast appearance of the liver,  gallbladder, pancreas,  spleen, adrenal glands, and kidneys.  No renal stone or hydronephrosis.  Urinary bladder catheter present.     No bowel dilation.  No peritoneal fluid.     No bowel obstruction and no free air.     Patchy abdominal wall and mesenteric edema noted.     Summary:  1. No mass, abscess, or bowel obstruction.                                   This report was finalized on 11/12/2020 19:38 by Dr. Tay Crisostomo MD.    CT Chest Without Contrast [[id number]] Collected: 11/12/20 1935     Updated: 11/12/20 1939    Narrative:      EXAMINATION: CT CHEST WO CONTRAST-      11/12/2020 6:36 PM CST     HISTORY: Respiratory failure. Fall injury.     In order to have a CT radiation dose as low as reasonably achievable  Automated Exposure Control was utilized for adjustment of the mA and/or  KV according to patient size.     DLP in mGycm= 314.     Heart size is at the upper limits of normal.  Aortic calcification with no aneurysm.     Low-lying endotracheal tube with the tip 12 mm above the avery.     Granulomatous lymph node calcification within the middle mediastinum and  at the right hilum.     Bilateral infiltrate for which pneumonia is favored over pulmonary  edema.  No pneumothorax and no significant pleural effusion.     Summary:  1. Bilateral pneumonia.  2. Somewhat low-lying endotracheal tube with the tip 12 mm above the  avery.                                   This report was finalized on 11/12/2020 19:36 by Dr. Tay Crisostomo MD.    CT Maxillofacial Without Contrast [[id number]] Collected: 11/12/20 1934     Updated: 11/12/20 1938    Narrative:      EXAMINATION: CT MAXILLOFACIAL WO CONT-      11/12/2020 6:36 PM CST     HISTORY: Fall injury. Head and face trauma.     In order to have a CT radiation dose as low as reasonably achievable  Automated Exposure Control was utilized for adjustment of the mA and/or  KV according to patient size.     DLP in mGycm= 158.     Endotracheal tube present.     Intact  mandible and maxilla.  Intact nasal bones and zygomatic arches.     Bony orbits are intact.     Paranasal sinuses are essentially clear.     Summary:  1. No facial bone fracture.                                   This report was finalized on 11/12/2020 19:34 by Dr. Tay Crisostomo MD.    CT Cervical Spine Without Contrast [[id number]] Collected: 11/12/20 1932     Updated: 11/12/20 1937    Narrative:      EXAMINATION: CT CERVICAL SPINE WO CONTRAST-      11/12/2020 6:53 PM CST     HISTORY: Fall injury. Head and neck trauma.     In order to have a CT radiation dose as low as reasonably achievable  Automated Exposure Control was utilized for adjustment of the mA and/or  KV according to patient size.     DLP in mGycm= 374.     Axial, sagittal, and coronal noncontrast CT imaging.     Endotracheal tube present.     Vertebral bodies and posterior elements are intact.     Reconstructed sagittal images show normal curvature.   There is no malalignment. Prevertebral soft tissues are normal.   Facet joints align normally.     Reconstructed coronal images show normal lateral mass alignment.     C4-5, C5-6, and C6-7 discectomy with anterior plate and screw fusion.  Uncinate spurring with mild foraminal narrowing at C5-6 and C6-7.       Impression:      1. No acute fracture.     .                                   This report was finalized on 11/12/2020 19:33 by Dr. Tay Crisostomo MD.    CT Head Without Contrast [[id number]] Collected: 11/12/20 1923     Updated: 11/12/20 1927    Narrative:      EXAMINATION: CT HEAD WO CONTRAST-      11/12/2020 6:36 PM CST     HISTORY: Mental status change, unknown cause     In order to have a CT radiation dose as low as reasonably achievable  Automated Exposure Control was utilized for adjustment of the mA and/or  KV according to patient size.     DLP in mGycm= 640.     Noncontrast head CT compared with a brain MRI exam from 6/10/2020.     Axial, sagittal, and coronal noncontrast CT imaging of the  head.     The visualized paranasal sinuses are clear.     The brain and ventricles have an age appropriate appearance.   There is no hemorrhage or mass-effect.   No acute infarction is seen.     No calvarial abnormality.       Impression:      1. No acute intracranial abnormality is seen.                                         This report was finalized on 11/12/2020 19:24 by Dr. Tay Crisostomo MD.    XR Knee 1 or 2 View Right [[id number]] Collected: 11/12/20 1703     Updated: 11/12/20 1706    Narrative:      EXAMINATION: XR KNEE 1 OR 2 VW RIGHT-     11/12/2020 4:58 PM CST     HISTORY: Right knee pain and swelling.     Right knee, 2 views.     Extensive chondrocalcinosis of the tibiofemoral compartments.     Intact distal femur and proximal tibia and fibula.  Intact patella.     No significant joint effusion.     Summary:  1. Osteoarthritic joint changes.  2. No acute bony abnormality.     This report was finalized on 11/12/2020 17:03 by Dr. Tay Crisostomo MD.    XR Chest 1 View [[id number]] Collected: 11/12/20 1701     Updated: 11/12/20 1705    Narrative:      EXAMINATION: XR CHEST 1 VW-     11/12/2020 4:49 PM CST     HISTORY: Simple Sepsis Protocol     1 view chest x-ray compared with 9/25/2020.     Endotracheal tube is present. The tip lies 18 mm above the avery.     Magnified heart size.     Interstitial lung disease with patchy bilateral infiltrate most  prominent in the left perihilar region.     Summary:  1. Endotracheal tube present. The tip lies 18 mm above the avery.  2. Patchy bilateral pneumonia.  This report was finalized on 11/12/2020 17:02 by Dr. Tay Crisostomo MD.        My radiograph interpretation/independent review of imaging: Hyperexpansion consistent with COPD, bilateral patchy infiltrates.    Other test results (not lab or imaging):   Results for orders placed during the hospital encounter of 07/27/20   Adult Transthoracic Echo Complete W/ Cont if Necessary Per Protocol    Narrative · Estimated EF  = 55%.  · Moderate tricuspid valve regurgitation is present.  · Right ventricular cavity is mildly dilated.  · Mildly reduced right ventricular systolic function noted.  · Abnormal global Longitudinal LV strain = -7.8%.  · Moderate to severe pulmonary hypertension is present.         Independent review of ekg: Normal sinus rhythm 77, prolonged QT at 556.    Problem List as identified by Epic (may contain historical, inactive problems)  Patient Active Problem List   Diagnosis   • Hyponatremia   • Anemia   • COPD (chronic obstructive pulmonary disease) (CMS/HCC)   • Chronic pain   • Current every day smoker   • SCCA (squamous cell carcinoma) of skin   • Frequent falls   • Seizures (CMS/HCC)   • Syncope   • Nonspecific dizziness   • Squamous cell cancer of skin of nose   • Severe sepsis (CMS/HCC)   • Bilateral pneumonia   • Acute respiratory failure with hypoxia (CMS/HCC)   • Transaminitis   • Thrombocytopenia (CMS/HCC)   • Hypothermia     Pulmonary Assessment:  SEVERE ACUTE RESPIRATORY FAILURE REQUIRING MECHANICAL VENTILATION  This is a threat to life or pulmonary function, high risk and due to sepsis, pneumonia.    New problem (to me), with additional workup planned: Acute on chronic hypoxemic respiratory failure    New problem (to me), no additional workup planned: Thrombocytopenia    Other problems either stable, failing to improve or worsenin. Pulmonary hypertension  2. Tobacco abuse  3. Probable COPD  4. Anemia  5. Rhabdomyolysis  6. Hypothermia  7. Moderate to severe pulmonary hypertension    Recommend/plan:   · Continue mechanical ventilation, day #2.  No vent changes made.  · Multiple CT scans done. CT of the chest shows bilateral infiltrates.  Continue antibiotics with vancomycin and cefepime, day #2.   · Stress ulcer prophylaxis with Pepcid.  · SCDs for DVT prophylaxis.  Hold Lovenox in the setting of thrombocytopenia.  · PCR panel reviewed as negative.  · MRSA negative.    · Strep pneumo and Legionella  antigen negative.  · Sputum culture pending.  · Continue bronchodilator treatments with DuoNeb.  · Start NicoDerm patch.  · Discussed with JAVIER Leong to try and determine from a family member how much EtOH the patient consumes.  · Echo pending.  · Ultrasound of liver pending.  · Lower extremity duplex studies pending.  · Daily chest x-ray and ABG while on ventilator.  · Further recommendations per  later today.     Thank you for this consult.  We will follow along.  Electronically signed by SHANNAN Givens on 11/13/2020 at 07:19 CST     ATTESTATION OF CLINICAL NOTE:  I have reviewed the notes, assessments, and/or procedures performed by SHANNAN Urbina, I concur with her/his documentation of Muriel Hernandez.    Patient is a 60 years old  female who was seen as a new pulmonary consult in the intensive clinic today.  At the time of my visit patient was intubated and sedated and was unable to provide any history.  History was obtained from the chart notes and from talking to RN and SHANNAN.    Patient was found unresponsive at home covered in feces and was brought to the hospital and remains intubated and currently on propofol infusion.  She is on minimal ventilator support and apparently she has a long history of tobacco abuse and also has history of alcohol use in the past.  She has noncompliance with her medications and to my knowledge she was not on any inhalers or home oxygen.  Her initial work-up shows a negative screen for MRSA streptococcal pneumonia and Legionella.  She is started on bronchodilator treatment and DuoNeb.  She is also on NicoDerm patch her initial lab work showed she had very high BNP suggesting underlying heart failure.  She negative screen for influenza and COVID-19.  She is currently getting routine bronchodilator treatment and started on cefepime and vancomycin for possible pneumonia.  She is getting Precedex and fentanyl drip.    On  physical examination patient is a thin built malnourished middle aged  female orally intubated sedated on ventilator.  HEENT: Atraumatic normocephalic pupils: Normal reacting to light and accommodation.  Neck: Supple no mass jugular venous distention no lymphadenopathy no thyroid swelling.  Heart: Sounds normal rate and rhythm regular no murmur lungs: Bibasilar crackles and diffuse wheezes.  Abdomen: Soft nontender bowel sounds presents extremities: No edema normal pulses normal color.  Neurologic: Grossly intact no focal deficit.  Skin: There is no new breakdown.  Psych: Could not be evaluated.    Patient was found unresponsive at home most likely due to hypoxic event.  She has history of COPD and probably has home oxygen according to the family but I am not sure whether she was using it or not.  Strip tobacco abuse and prior history of alcohol abuse and also had polysubstance abuse.  Her drug screen positive for opiates which can cause respiratory depression.  For now I would recommend to continue her on the IV antibiotics for possible pneumonia and aspiration.  She had bilateral pulmonary infiltrate in the left lingular and left lower lobe and right upper lobe.  We will wait for the cultures and adjust antibiotics accordingly.  She will be getting Pulmicort and DuoNeb for underlying COPD and ventilator support will be continued and spontaneous ventilator weaning will be started tomorrow.  She will need long-term plan for outpatient pulmonary function test adjustment of other treatment and will need to work on smoking cessation.  She will be started on nicotine patch and I am not sure whether she was still consuming alcohol and we will watch carefully for alcohol withdrawal syndromes and she may need to start on CIWA protocol.  If she is on chronic pain medications that needs to be carefully adjusted due to risk of respiratory worsening.  He is chronically malnourished and will need nutritional support.    DVT and ulcer prophylaxis and pain and anxiety control.  Social issues needs to be addressed.  Repeat labs and arterial blood gas and imaging studies as needed.    We appreciate the consult and like to thank the referring physician.  Patient is full code at this moment pulmonary team will continue following her and make further recommendations.  Total time spent in seeing this patient as pulmonary consult was 45 minutes.    I have seen and examined patient personally, performing a face-to-face diagnostic evaluation with plan of care reviewed and developed with APRN and nursing staff. I have addended and/or modified the above history of present illness, physical examination, and assessment and plan to reflect my findings and impressions. Essential elements of the care plan were discussed with APRN above.  Agree with findings and assessment/plan as documented above.    Mary Montanez MD  Pulmonologist/Intensivist  11/13/2020 18:14 CST

## 2020-11-13 NOTE — PROGRESS NOTES
[facility] Medicine Services  INPATIENT PROGRESS NOTE    Patient Name: Muriel Hernandez  Date of Admission: 11/12/2020  Today's Date: 11/13/20  Length of Stay: 1  Primary Care Physician: Real Alicea MD    Subjective   Chief Complaint: Found down; bilateral PNA on imaging; intubated and on mechanical ventilation  HPI   Patient's sedation is currently on hold.  This has resulted in an increased respiratory rate and heart rate.  Patient will open her eyes briefly to verbal and tactile stimuli, but I cannot get her to follow any commands.  Nursing staff reports that earlier when they asked her to perform a hand grasp, she nodded her head left to right as if to respond no.  Her core temperature was around 90 degrees upon arrival, and a Rosa hugger has been in place and she is now more normothermic.  She has not required any vasopressors, however nursing reports that her blood pressures were soft earlier while on propofol.  No family at bedside.  It is unknown how long she was down before being found.    Review of Systems     All pertinent negatives and positives are as above. All other systems have been reviewed and are negative unless otherwise stated.     Objective    Temp:  [90.1 °F (32.3 °C)-98.6 °F (37 °C)] 98.6 °F (37 °C)  Heart Rate:  [] 117  Resp:  [15-29] 17  BP: ()/() 105/65  FiO2 (%):  [30 %] 30 %  Physical Exam  Vitals signs and nursing note reviewed.   Constitutional:       Appearance: She is ill-appearing.   HENT:      Head: Normocephalic.      Mouth/Throat:      Pharynx: No oropharyngeal exudate (ET tube in place).   Eyes:      Pupils: Pupils are equal, round, and reactive to light.   Neck:      Musculoskeletal: Neck supple.   Cardiovascular:      Rate and Rhythm: Regular rhythm. Tachycardia present.   Pulmonary:      Breath sounds: Rhonchi present. No wheezing.      Comments: On mechanical ventilation at 30% Fi02 and 5 PEEP  Abdominal:       Palpations: Abdomen is soft.   Genitourinary:     Comments: Cavazos  Musculoskeletal:         General: No swelling.      Comments: SCDs in place   Skin:     Findings: Bruising (noted to RLE (near right knee)) present.   Neurological:      Comments: Opens eyes to verbal and tactile stimuli; withdraws from noxious stimuli.  I could not get her to follow any commands this AM         Results Review:  I have reviewed the labs, radiology results, and diagnostic studies.    Laboratory Data:   Results from last 7 days   Lab Units 11/13/20  0340 11/12/20  1611   WBC 10*3/mm3 8.31 8.22   HEMOGLOBIN g/dL 8.7* 9.6*   HEMATOCRIT % 27.8* 31.0*   PLATELETS 10*3/mm3 64* 73*        Results from last 7 days   Lab Units 11/13/20  0340 11/12/20  1611   SODIUM mmol/L 145 139   POTASSIUM mmol/L 3.8 4.2   CHLORIDE mmol/L 109* 100   CO2 mmol/L 26.0 25.0   BUN mg/dL 22 29*   CREATININE mg/dL 0.42* 0.50*   CALCIUM mg/dL 9.6 10.5   BILIRUBIN mg/dL 2.4* 2.5*   ALK PHOS U/L 119* 134*   ALT (SGPT) U/L 46* 45*   AST (SGOT) U/L 158* 169*   GLUCOSE mg/dL 97 179*       Culture Data:   No results found for: BLOODCX, URINECX, WOUNDCX, MRSACX, RESPCX, STOOLCX    Radiology Data:   Imaging Results (Last 24 Hours)     Procedure Component Value Units Date/Time    XR Abdomen KUB [[id number]] Collected: 11/13/20 0713     Updated: 11/13/20 0717    Narrative:      EXAMINATION: XR ABDOMEN KUB-  11/13/2020 7:13 AM CST     HISTORY: OG placement; A41.9-Sepsis, unspecified organism; R65.20-Severe  sepsis without septic shock; J18.9-Pneumonia, unspecified organism;  J96.01-Acute respiratory failure with hypoxia; M62.82-Rhabdomyolysis;  D68.9-Coagulation defect, unspecified      COMPARISON: 11/13/2020     FINDINGS:  Limited exam for evaluation of the enteric tube placement. The tube tip  projects over the body of the stomach. Endotracheal tube tip is  unchanged from the previous chest x-ray.     Evaluation for free intraperitoneal air is limited on a supine  radiograph,  but there are no definite findings of free intraperitoneal  air.      The osseous structures demonstrate no acute abnormality.           Impression:         1.  Enteric tube tip projects over the stomach.         This report was finalized on 11/13/2020 07:14 by Dr. Martinez Hernandez MD.    XR Chest 1 View [[id number]] Resulted: 11/13/20 0349     Updated: 11/13/20 0349    CT Abdomen Pelvis Without Contrast [[id number]] Collected: 11/12/20 1936     Updated: 11/12/20 1941    Narrative:      EXAMINATION: CT ABDOMEN PELVIS WO CONTRAST-      11/12/2020 6:36 PM CST     HISTORY: sepsis, abd pain     In order to have a CT radiation dose as low as reasonably achievable  Automated Exposure Control was utilized for adjustment of the mA and/or  KV according to patient size.     DLP in mGycm= 839.     Noncontrast abdomen pelvis CT.     Normal noncontrast appearance of the liver, gallbladder, pancreas,  spleen, adrenal glands, and kidneys.  No renal stone or hydronephrosis.  Urinary bladder catheter present.     No bowel dilation.  No peritoneal fluid.     No bowel obstruction and no free air.     Patchy abdominal wall and mesenteric edema noted.     Summary:  1. No mass, abscess, or bowel obstruction.                                   This report was finalized on 11/12/2020 19:38 by Dr. Tay Crisostomo MD.    CT Chest Without Contrast [[id number]] Collected: 11/12/20 1935     Updated: 11/12/20 1939    Narrative:      EXAMINATION: CT CHEST WO CONTRAST-      11/12/2020 6:36 PM CST     HISTORY: Respiratory failure. Fall injury.     In order to have a CT radiation dose as low as reasonably achievable  Automated Exposure Control was utilized for adjustment of the mA and/or  KV according to patient size.     DLP in mGycm= 314.     Heart size is at the upper limits of normal.  Aortic calcification with no aneurysm.     Low-lying endotracheal tube with the tip 12 mm above the avery.     Granulomatous lymph node calcification within the middle  mediastinum and  at the right hilum.     Bilateral infiltrate for which pneumonia is favored over pulmonary  edema.  No pneumothorax and no significant pleural effusion.     Summary:  1. Bilateral pneumonia.  2. Somewhat low-lying endotracheal tube with the tip 12 mm above the  avery.                                   This report was finalized on 11/12/2020 19:36 by Dr. Tay Crisostomo MD.    CT Maxillofacial Without Contrast [[id number]] Collected: 11/12/20 1934     Updated: 11/12/20 1938    Narrative:      EXAMINATION: CT MAXILLOFACIAL WO CONT-      11/12/2020 6:36 PM CST     HISTORY: Fall injury. Head and face trauma.     In order to have a CT radiation dose as low as reasonably achievable  Automated Exposure Control was utilized for adjustment of the mA and/or  KV according to patient size.     DLP in mGycm= 158.     Endotracheal tube present.     Intact mandible and maxilla.  Intact nasal bones and zygomatic arches.     Bony orbits are intact.     Paranasal sinuses are essentially clear.     Summary:  1. No facial bone fracture.                                   This report was finalized on 11/12/2020 19:34 by Dr. Tay Crisostomo MD.    CT Cervical Spine Without Contrast [[id number]] Collected: 11/12/20 1932     Updated: 11/12/20 1937    Narrative:      EXAMINATION: CT CERVICAL SPINE WO CONTRAST-      11/12/2020 6:53 PM CST     HISTORY: Fall injury. Head and neck trauma.     In order to have a CT radiation dose as low as reasonably achievable  Automated Exposure Control was utilized for adjustment of the mA and/or  KV according to patient size.     DLP in mGycm= 374.     Axial, sagittal, and coronal noncontrast CT imaging.     Endotracheal tube present.     Vertebral bodies and posterior elements are intact.     Reconstructed sagittal images show normal curvature.   There is no malalignment. Prevertebral soft tissues are normal.   Facet joints align normally.     Reconstructed coronal images show normal lateral mass  alignment.     C4-5, C5-6, and C6-7 discectomy with anterior plate and screw fusion.  Uncinate spurring with mild foraminal narrowing at C5-6 and C6-7.       Impression:      1. No acute fracture.     .                                   This report was finalized on 11/12/2020 19:33 by Dr. Tay Crisostomo MD.    CT Head Without Contrast [[id number]] Collected: 11/12/20 1923     Updated: 11/12/20 1927    Narrative:      EXAMINATION: CT HEAD WO CONTRAST-      11/12/2020 6:36 PM CST     HISTORY: Mental status change, unknown cause     In order to have a CT radiation dose as low as reasonably achievable  Automated Exposure Control was utilized for adjustment of the mA and/or  KV according to patient size.     DLP in mGycm= 640.     Noncontrast head CT compared with a brain MRI exam from 6/10/2020.     Axial, sagittal, and coronal noncontrast CT imaging of the head.     The visualized paranasal sinuses are clear.     The brain and ventricles have an age appropriate appearance.   There is no hemorrhage or mass-effect.   No acute infarction is seen.     No calvarial abnormality.       Impression:      1. No acute intracranial abnormality is seen.                                         This report was finalized on 11/12/2020 19:24 by Dr. Tay Crisostomo MD.    XR Knee 1 or 2 View Right [[id number]] Collected: 11/12/20 1703     Updated: 11/12/20 1706    Narrative:      EXAMINATION: XR KNEE 1 OR 2 VW RIGHT-     11/12/2020 4:58 PM CST     HISTORY: Right knee pain and swelling.     Right knee, 2 views.     Extensive chondrocalcinosis of the tibiofemoral compartments.     Intact distal femur and proximal tibia and fibula.  Intact patella.     No significant joint effusion.     Summary:  1. Osteoarthritic joint changes.  2. No acute bony abnormality.     This report was finalized on 11/12/2020 17:03 by Dr. Tay Crisostomo MD.    XR Chest 1 View [[id number]] Collected: 11/12/20 1701     Updated: 11/12/20 1705    Narrative:       EXAMINATION: XR CHEST 1 VW-     11/12/2020 4:49 PM CST     HISTORY: Simple Sepsis Protocol     1 view chest x-ray compared with 9/25/2020.     Endotracheal tube is present. The tip lies 18 mm above the avery.     Magnified heart size.     Interstitial lung disease with patchy bilateral infiltrate most  prominent in the left perihilar region.     Summary:  1. Endotracheal tube present. The tip lies 18 mm above the avery.  2. Patchy bilateral pneumonia.  This report was finalized on 11/12/2020 17:02 by Dr. Tay Crisostomo MD.          I have reviewed the patient's current medications.     Assessment/Plan     Active Hospital Problems    Diagnosis   • Bilateral pneumonia   • Acute respiratory failure with hypoxia (CMS/HCC)   • Transaminitis   • Thrombocytopenia (CMS/HCC)   • Hypothermia   • Severe sepsis (CMS/HCC)   • Seizures (CMS/HCC)   • Current every day smoker   • COPD (chronic obstructive pulmonary disease) (CMS/HCC)   • Chronic pain   • Anemia     Plan:  1.  IV Vanco and Cefepime - day 2  2.  Add respiratory culture, respiratory PCR panel, strep pneumo and urine legionella Ag  3.  Check MRSA PCR as well  4.  Core temp with Rosa Hugger has increased 90.1 --> 98.6  5.  IVFs with LR  6.  Has not required any vasopressors  7.  Sedation with Fentanyl and Ativan; Precedex also ordered.  Will d/c orders for Propofol given shortage of this medication  8.  Patient on ACVC vent settings at 30% Fi02 and 5 PEEP.  Hopefully we can begin pressure support trial and assess readiness for extubation soon.  ABG results reviewed  9.  Check peripheral smear  10.  Check urine myoglobin  11.  Repeat labs in AM including repeat CPK  12.  Pulmonary consulted; appreciate their assistance  13.  IV Pepcid; SCDs - holding Lovenox in setting of thrombocytopenia  14.  COVID-19 PCR negative  15.  D-Dimer 3.12.  CT Chest performed in ED was noncontrasted procedure.  Obtain Echocardiogram.  If any evidence of RV strain will need to get CTA Chest  for further assessment.  Will also obtain Duplex studies of bilateral LEs.  16.  Patient is critically ill.  > 30 min critical care time spent.      SEPTIC SHOCK FOCUSED EXAM    *Must be completed by a licensed independent Practitioner within 6 hours of diagnosis for the following conditions -- Septic Shock or Severe Sepsis with Lactate >/= 4.    Fluid bolus must be completed prior to assessment.      Diagnosis: Severe Sepsis     Vital Signs (Attestation) Reviewed Temp, HR, RR, BP, SpO2   Shock Index (HR/SBP) (Attestation) Reviewed    Urine Output  adequate   General ill appearing   Respiratory rhonchi   Cardiac/Chest tachycardia   Skin (documentation of skin color is required) warm/dry   Capillary Refill delayed   Peripheral Pulses Checked                          DP and PT had to be dopplered     † Septic shock is defined by CMS as severe sepsis plus one of the following: persistent hypotension after fluid bolus OR tissue hypoperfusion (Lactic Acid ?4)  †† ONE OF THE FOLLOWING can be done in lieu of the Focused Exam: Measure CVP; Measure ScVO2; Echocardiogram (cardiac echo or cardiac ultrasound); Dynamic assessment of fluid responsiveness with passive leg raise or fluid challenge.    Rodolfo Menendez MD  11/13/20  07:20 CST      Electronically signed by Rodolfo Menendez MD, 11/13/20, 07:19 CST.

## 2020-11-13 NOTE — H&P
[facility] Medicine Services  HISTORY AND PHYSICAL    Date of Admission: 11/12/2020  Primary Care Physician: Real Alicea MD    Subjective     Chief Complaint: Found down    History of Present Illness  60-year-old female presents the emergency department unresponsive.  Apparently the family checks on her every 1 to 2 weeks.  Today when they went to check on her, she was found on the ground/down.  She has history of COPD, polysubstance abuse, and chronic hyponatremia.  In the ED doctor described her breathing as agonal respirations.  She was subsequently intubated.  She was given vancomycin and cefepime in the ED.  The patient is intubated on my exam and unable to give history.  She does have some periorbital edema, and CTs were performed for concern of her falling on her face.    The patient's last ER visit was on 9/25/2020 for hypotension.  That time her sodium was 121.    7/27/2020 stress test with myocardial perfusion:  · Raw images reviewed with the following abnormalities noted: scanned with arms to the side.  · Left ventricular ejection fraction is normal (Calculated EF = 65%).  · Myocardial perfusion imaging indicates a normal myocardial perfusion study with no evidence of ischemia.  · Impressions are consistent with a low risk study.    7/27/2020 cardiac echo:  · Estimated EF = 55%.  · Moderate tricuspid valve regurgitation is present.  · Right ventricular cavity is mildly dilated.  · Mildly reduced right ventricular systolic function noted.  · Abnormal global Longitudinal LV strain = -7.8%.  · Moderate to severe pulmonary hypertension is present.      Review of Systems   Patient intubated, unable to obtain    Otherwise complete ROS reviewed and negative except as mentioned in the HPI.    Past Medical History:   Past Medical History:   Diagnosis Date   • Anxiety    • Arthritis    • Asthma    • Cancer (CMS/HCC)     skin   • Chronic back pain    • COPD (chronic  "obstructive pulmonary disease) (CMS/HCC)    • GERD (gastroesophageal reflux disease)    • Glaucoma    • Hypertension    • IBS (irritable bowel syndrome)    • Neoplasm of uncertain behavior     nasal tip   • On home oxygen therapy    • Skin cancer    • TIA (transient ischemic attack)    • UTI (urinary tract infection)      Past Surgical History:  Past Surgical History:   Procedure Laterality Date   • BREAST SURGERY Left 1982    NO LYMPH NODES REMOVED   • CERVICAL FUSION     • COLONOSCOPY     • FLAP HEAD/NECK N/A 9/15/2020    Procedure: flap inset;  Surgeon: Jovan Trejo MD;  Location:  [facility] OR;  Service: ENT;  Laterality: N/A;   • HEAD/NECK LESION/CYST EXCISION Bilateral 8/4/2020    Procedure: 1) 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 She has a 50.00 pack-year smoking history. She has never used smokeless tobacco. She reports current alcohol use. She reports that she does not use drugs.    Family History: family history includes Breast cancer in her mother; Cancer in her brother, maternal grandfather, and paternal grandmother; Colon cancer in her mother; Leukemia in her mother; Prostate cancer in her father; Skin cancer in her father.       Allergies:  Allergies   Allergen Reactions   • Sulfa Antibiotics Hives   • Local [Lidocaine] Other (See Comments)     \"novocaine\" causes sneezing     Medications:  Prior to Admission medications    " "  Medication Sig Start Date End Date Taking? Authorizing Provider   albuterol sulfate  (90 Base) MCG/ACT inhaler Inhale 2 puffs Every 6 (Six) Hours As Needed for Wheezing.    Santo Horvath MD   ALPRAZolam (XANAX) 0.5 MG tablet Take 0.5 mg by mouth 3 (Three) Times a Day As Needed for Anxiety.    Santo Horvath MD   bumetanide (BUMEX) 0.5 MG tablet Take 1 tablet by mouth Daily As Needed (EDEMA). 8/13/20   Julio Cesar Nguyen MD   dilTIAZem CD (CARDIZEM CD) 120 MG 24 hr capsule Take 1 capsule by mouth Daily. 8/13/20   Julio Cesar Nguyen MD   estradiol (ESTRACE) 1 MG tablet Take 1 mg by mouth Daily. 3/17/20   Santo Horvath MD   HYDROcodone-acetaminophen (NORCO)  MG per tablet Take 1 tablet by mouth Every 4 (Four) Hours As Needed for Moderate Pain  (Pain). 8/4/20   Jovan Trejo MD   ipratropium-albuterol (DUO-NEB) 0.5-2.5 mg/3 ml nebulizer Take 3 mL by nebulization 4 (Four) Times a Day. 10/1/19   Massimo Carlson MD   Methocarbamol (ROBAXIN-750 PO) Take 1 tablet by mouth As Needed.    Santo Horvath MD   metoprolol succinate XL (TOPROL-XL) 50 MG 24 hr tablet Take 100 mg by mouth 2 (Two) Times a Day.    Santo Horvath MD   montelukast (SINGULAIR) 10 MG tablet Take 10 mg by mouth Daily.    Santo Horvath MD   O2 (OXYGEN) Inhale 2 L/min 1 (One) Time.    Santo Horvath MD   omeprazole (priLOSEC) 40 MG capsule  3/17/20   Santo Horvath MD   Potassium 99 MG tablet Take 1 tablet by mouth Daily.    Santo Horvath MD     Objective     Vital Signs: /58   Pulse 80   Temp (!) 90.5 °F (32.5 °C) (Rectal)   Resp 16   Ht 152.4 cm (60\")   Wt 64.4 kg (142 lb)   SpO2 98%   BMI 27.73 kg/m²   Physical Exam  Constitutional:       Appearance: She is ill-appearing.      Comments: Mildly responsive   HENT:      Head:      Comments: Facial edema. Periorbital edema worse around the left eye. Bruising to the right eye.      Nose: Nose normal. No rhinorrhea.      " Comments: Nasal tip scar, appears to be from flap     Mouth/Throat:      Comments: ET tube. Facial crusting to the left side of the face and lips.   Eyes:      General: No scleral icterus.  Cardiovascular:      Rate and Rhythm: Regular rhythm.   Pulmonary:      Breath sounds: Wheezing and rhonchi present.   Abdominal:      General: Abdomen is flat. There is no distension.   Genitourinary:     Comments: Cavazos  Musculoskeletal: Normal range of motion.      Comments: ROM of the upper extremities appears appropriate as the patient is gripping the side bed rail when turned.    Skin:     Comments: Bilateral LE skin is peeling and dry. Caked feces to the lateral aspect of the right foot. Appears that patient has been laying, with bruising or pressure wound to the left flank. Pressure wound the the left ankle, left hip, right medial knee and right lateral knee that appears older than the others.    Neurological:      Comments: Patient responsive and following commands on vent.    Psychiatric:      Comments: Flat       Results Reviewed:  Lab Results (last 24 hours)     Procedure Component Value Units Date/Time    Blood Gas, Arterial - [[id number]]  (Abnormal) Collected: 11/12/20 2030    Specimen: Arterial Blood Updated: 11/12/20 2041     Site Right Radial     Mat's Test Positive     pH, Arterial 7.309 pH units      Comment: 84 Value below reference range        pCO2, Arterial 47.9 mm Hg      Comment: 83 Value above reference range        pO2, Arterial 83.1 mm Hg      HCO3, Arterial 24.0 mmol/L      Base Excess, Arterial -2.3 mmol/L      Comment: 84 Value below reference range        O2 Saturation, Arterial 95.2 %      Temperature 37.0 C      Barometric Pressure for Blood Gas 751 mmHg      Modality Ventilator     FIO2 30 %      Ventilator Mode AC     Set Tidal Volume 450     Set [facility] Resp Rate 16.0     PEEP 5.0     Collected by [id number]     Comment: Meter: [id number]     :  [id number]        pCO2, Temperature  Corrected 47.9 mm Hg      pH, Temp Corrected 7.309 pH Units      pO2, Temperature Corrected 83.1 mm Hg     Lactic Acid, Reflex [[id number]]  (Abnormal) Collected: 11/12/20 2003    Specimen: Blood Updated: 11/12/20 2039     Lactate 2.7 mmol/L     Fibrinogen [[id number]]  (Normal) Collected: 11/12/20 1611    Specimen: Blood Updated: 11/12/20 2025     Fibrinogen 305 mg/dL     D-dimer, Quantitative [[id number]]  (Abnormal) Collected: 11/12/20 1611    Specimen: Blood Updated: 11/12/20 2025     D-Dimer, Quantitative 3.12 mg/L (FEU)     Narrative:      Reference Range is 0-0.50 mg/L FEU. However, results <0.50 mg/L FEU tends to rule out DVT or PE. Results >0.50 mg/L FEU are not useful in predicting absence or presence of DVT or PE.      aPTT [[id number]]  (Abnormal) Collected: 11/12/20 1611    Specimen: Blood Updated: 11/12/20 2025     PTT 48.4 seconds     Lipase [[id number]]  (Abnormal) Collected: 11/12/20 1611    Specimen: Blood Updated: 11/12/20 2024     Lipase 156 U/L     Antithrombin III [[id number]] Collected: 11/12/20 2014    Specimen: Blood Updated: 11/12/20 2017    Protein C & S, Functional [[id number]] Collected: 11/12/20 2008    Specimen: Blood Updated: 11/12/20 2017    Lactic Acid, Reflex Timer (This will reflex a repeat order 3-3:15 hours after ordered.) [[id number]] Collected: 11/12/20 1611    Specimen: Blood Updated: 11/12/20 1945     Hold Tube Hold for add-ons.     Comment: Auto resulted.       Urine Drug Screen - Urine, Catheter [[id number]]  (Abnormal) Collected: 11/12/20 1721    Specimen: Urine, Catheter Updated: 11/12/20 1827     THC, Screen, Urine Negative     Phencyclidine (PCP), Urine Negative     Cocaine Screen, Urine Negative     Methamphetamine, Ur Negative     Opiate Screen Positive     Amphetamine Screen, Urine Negative     Benzodiazepine Screen, Urine Positive     Tricyclic Antidepressants Screen Negative     Methadone Screen, Urine Negative     Barbiturates Screen, Urine Negative     Oxycodone Screen,  Urine Negative     Propoxyphene Screen Negative     Buprenorphine, Screen, Urine Negative    Narrative:      Cutoff For Drugs Screened:    Amphetamines               500 ng/ml  Barbiturates               200 ng/ml  Benzodiazepines            150 ng/ml  Cocaine                    150 ng/ml  Methadone                  200 ng/ml  Opiates                    100 ng/ml  Phencyclidine               25 ng/ml  THC                            50 ng/ml  Methamphetamine            500 ng/ml  Tricyclic Antidepressants  300 ng/ml  Oxycodone                  100 ng/ml  Propoxyphene               300 ng/ml  Buprenorphine               10 ng/ml    The normal value for all drugs tested is negative. This report includes unconfirmed screening results, with the cutoff values listed, to be used for medical treatment purposes only.  Unconfirmed results must not be used for non-medical purposes such as employment or legal testing.  Clinical consideration should be applied to any drug of abuse test, particularly when unconfirmed results are used.      Blood Culture - Blood, Arm, Right [[id number]] Collected: 11/12/20 1720    Specimen: Blood from Arm, Right Updated: 11/12/20 1745    Urinalysis With Culture If Indicated - Urine, Catheter [[id number]]  (Abnormal) Collected: 11/12/20 1720    Specimen: Urine, Catheter Updated: 11/12/20 1733     Color, UA Yellow     Appearance, UA Clear     pH, UA 5.5     Specific Gravity, UA 1.012     Glucose, UA Negative     Ketones, UA Trace     Bilirubin, UA Negative     Blood, UA Moderate (2+)     Protein, UA Negative     Leuk Esterase, UA Negative     Nitrite, UA Negative     Urobilinogen, UA 0.2 E.U./dL    Urinalysis, Microscopic Only - Urine, Catheter [[id number]]  (Abnormal) Collected: 11/12/20 1720    Specimen: Urine, Catheter Updated: 11/12/20 1733     RBC, UA 6-12 /HPF      WBC, UA None Seen /HPF      Bacteria, UA None Seen /HPF      Squamous Epithelial Cells, UA None Seen /HPF      Hyaline Casts, UA 0-2  /LPF      Methodology Automated Microscopy    COVID PRE-OP / PRE-PROCEDURE SCREENING ORDER (NO ISOLATION) - Swab, Nasal Cavity [[id number]]  (Normal) Collected: 11/12/20 1653    Specimen: Swab from Nasal Cavity Updated: 11/12/20 1730    Narrative:      The following orders were created for panel order COVID PRE-OP / PRE-PROCEDURE SCREENING ORDER (NO ISOLATION) - Swab, Nasal Cavity.  Procedure                               Abnormality         Status                     ---------                               -----------         ------                     COVID-19, ABBOTT IN-HOUS...[[id number]]  Normal              Final result                 Please view results for these tests on the individual orders.    COVID-19, ABBOTT IN-HOUSE,NP Swab (NO TRANSPORT MEDIA) 2 HR TAT - Swab, Nasal Cavity [[id number]]  (Normal) Collected: 11/12/20 1653    Specimen: Swab from Nasal Cavity Updated: 11/12/20 1730     COVID19 Not Detected    Narrative:      Fact sheet for providers: https://www.fda.gov/media/198232/download     Fact sheet for patients: https://www.fda.gov/media/176067/download    Influenza Antigen, Rapid - Swab, Nasopharynx [[id number]]  (Normal) Collected: 11/12/20 1653    Specimen: Swab from Nasopharynx Updated: 11/12/20 1723     Influenza A Ag, EIA Negative     Influenza B Ag, EIA Negative    Narrative:      Recommend confirmation of negative results by viral culture or molecular assay.    [place] Draw [[id number]] Collected: 11/12/20 1611    Specimen: Blood Updated: 11/12/20 1715    Narrative:      The following orders were created for panel order [place] Draw.  Procedure                               Abnormality         Status                     ---------                               -----------         ------                     Light Blue Top[[id number]]                                   Final result               Green Top (Gel)[[id number]]                                  Final result               Lavender  Top[[id number]]                                     Final result               Red Top[[id number]]                                          Final result               [place] Blood Culture Mal...[[id number]]                      Final result               Gray Top - Ice[[id number]]                                   Final result                 Please view results for these tests on the individual orders.    Light Blue Top [[id number]] Collected: 11/12/20 1611    Specimen: Blood Updated: 11/12/20 1715     Extra Tube hold for add-on     Comment: Auto resulted       Lavender Top [[id number]] Collected: 11/12/20 1611    Specimen: Blood Updated: 11/12/20 1715     Extra Tube hold for add-on     Comment: Auto resulted       Red Top [[id number]] Collected: 11/12/20 1611    Specimen: Blood Updated: 11/12/20 1715     Extra Tube Hold for add-ons.     Comment: Auto resulted.       [place] Blood Culture Bottle Set [[id number]] Collected: 11/12/20 1611    Specimen: Blood from Arm, Left Updated: 11/12/20 1715     Extra Tube Hold for add-ons.     Comment: Auto resulted.       Green Top (Gel) [[id number]] Collected: 11/12/20 1611    Specimen: Blood Updated: 11/12/20 1715     Extra Tube Hold for add-ons.     Comment: Auto resulted.       Gray Top - Ice [[id number]] Collected: 11/12/20 1611    Specimen: Blood Updated: 11/12/20 1715     Extra Tube Hold for add-ons.     Comment: Auto resulted.       Comprehensive Metabolic Panel [[id number]]  (Abnormal) Collected: 11/12/20 1611    Specimen: Blood Updated: 11/12/20 1657     Glucose 179 mg/dL      BUN 29 mg/dL      Creatinine 0.50 mg/dL      Sodium 139 mmol/L      Potassium 4.2 mmol/L      Comment: Slight hemolysis detected by analyzer. Results may be affected.        Chloride 100 mmol/L      CO2 25.0 mmol/L      Calcium 10.5 mg/dL      Total Protein 8.0 g/dL      Albumin 4.40 g/dL      ALT (SGPT) 45 U/L      AST (SGOT) 169 U/L      Alkaline Phosphatase 134 U/L      Total Bilirubin 2.5 mg/dL       eGFR Non African Amer 125 mL/min/1.73      Globulin 3.6 gm/dL      A/G Ratio 1.2 g/dL      BUN/Creatinine Ratio 58.0     Anion Gap 14.0 mmol/L     Narrative:      GFR Normal >60  Chronic Kidney Disease <60  Kidney Failure <15      Salicylate Level [[id number]]  (Normal) Collected: 11/12/20 1611    Specimen: Blood Updated: 11/12/20 1642     Salicylate <0.3 mg/dL     CK [[id number]]  (Abnormal) Collected: 11/12/20 1611    Specimen: Blood Updated: 11/12/20 1642     Creatine Kinase 1,525 U/L     C-reactive Protein [[id number]]  (Abnormal) Collected: 11/12/20 1611    Specimen: Blood Updated: 11/12/20 1642     C-Reactive Protein 3.52 mg/dL     Lactic Acid, Plasma [[id number]]  (Abnormal) Collected: 11/12/20 1611    Specimen: Blood Updated: 11/12/20 1642     Lactate 5.9 mmol/L     Acetaminophen Level [[id number]]  (Normal) Collected: 11/12/20 1611    Specimen: Blood Updated: 11/12/20 1642     Acetaminophen <5.0 mcg/mL     Ammonia [[id number]]  (Normal) Collected: 11/12/20 1613    Specimen: Blood Updated: 11/12/20 1639     Ammonia 20 umol/L     Ethanol [[id number]] Collected: 11/12/20 1611    Specimen: Blood Updated: 11/12/20 1638     Ethanol % <0.010 %     Narrative:      Not for legal purposes. Chain of Custody not followed.     Magnesium [[id number]]  (Normal) Collected: 11/12/20 1611    Specimen: Blood Updated: 11/12/20 1637     Magnesium 2.0 mg/dL     Protime-INR [[id number]]  (Abnormal) Collected: 11/12/20 1611    Specimen: Blood Updated: 11/12/20 1635     Protime 29.4 Seconds      INR 2.78    CBC & Differential [[id number]]  (Abnormal) Collected: 11/12/20 1611    Specimen: Blood Updated: 11/12/20 1629    Narrative:      The following orders were created for panel order CBC & Differential.  Procedure                               Abnormality         Status                     ---------                               -----------         ------                     CBC Auto Differential[[id number]]        Abnormal            Final  result                 Please view results for these tests on the individual orders.    CBC Auto Differential [[id number]]  (Abnormal) Collected: 11/12/20 1611    Specimen: Blood Updated: 11/12/20 1629     WBC 8.22 10*3/mm3      RBC 3.79 10*6/mm3      Hemoglobin 9.6 g/dL      Hematocrit 31.0 %      MCV 81.8 fL      MCH 25.3 pg      MCHC 31.0 g/dL      RDW 21.4 %      RDW-SD 59.2 fl      MPV --     Comment: TEST NOT PERFORMED        Platelets 73 10*3/mm3      Neutrophil % 85.4 %      Lymphocyte % 4.3 %      Monocyte % 9.0 %      Eosinophil % 0.1 %      Basophil % 0.1 %      Neutrophils, Absolute 7.02 10*3/mm3      Lymphocytes, Absolute 0.35 10*3/mm3      Monocytes, Absolute 0.74 10*3/mm3      Eosinophils, Absolute 0.01 10*3/mm3      Basophils, Absolute 0.01 10*3/mm3     Blood Culture - Blood, Arm, Left [[id number]] Collected: 11/12/20 1611    Specimen: Blood from Arm, Left Updated: 11/12/20 1628    Blood Gas, Arterial - [[id number]]  (Abnormal) Collected: 11/12/20 1550    Specimen: Arterial Blood Updated: 11/12/20 1600     Site Right Brachial     Mat's Test N/A     pH, Arterial 7.301 pH units      Comment: 84 Value below reference range        pCO2, Arterial 47.5 mm Hg      Comment: 83 Value above reference range        pO2, Arterial 56.0 mm Hg      Comment: 84 Value below reference range        HCO3, Arterial 23.4 mmol/L      Base Excess, Arterial -3.0 mmol/L      Comment: 84 Value below reference range        O2 Saturation, Arterial 83.5 %      Comment: 84 Value below reference range        Temperature 37.0 C      Barometric Pressure for Blood Gas 751 mmHg      Modality Room Air     FIO2 21 %      Ventilator Mode NA     Collected by [id number]     Comment: Meter: [id number]     :  [id number]        pCO2, Temperature Corrected 47.5 mm Hg      pH, Temp Corrected 7.301 pH Units      pO2, Temperature Corrected 56.0 mm Hg         Imaging Results (Last 24 Hours)     Procedure Component Value Units Date/Time    CT  Abdomen Pelvis Without Contrast [[id number]] Collected: 11/12/20 1936     Updated: 11/12/20 1941    Narrative:      EXAMINATION: CT ABDOMEN PELVIS WO CONTRAST-      11/12/2020 6:36 PM CST     HISTORY: sepsis, abd pain     In order to have a CT radiation dose as low as reasonably achievable  Automated Exposure Control was utilized for adjustment of the mA and/or  KV according to patient size.     DLP in mGycm= 839.     Noncontrast abdomen pelvis CT.     Normal noncontrast appearance of the liver, gallbladder, pancreas,  spleen, adrenal glands, and kidneys.  No renal stone or hydronephrosis.  Urinary bladder catheter present.     No bowel dilation.  No peritoneal fluid.     No bowel obstruction and no free air.     Patchy abdominal wall and mesenteric edema noted.     Summary:  1. No mass, abscess, or bowel obstruction.                                   This report was finalized on 11/12/2020 19:38 by Dr. Tay Crisostomo MD.    CT Chest Without Contrast [[id number]] Collected: 11/12/20 1935     Updated: 11/12/20 1939    Narrative:      EXAMINATION: CT CHEST WO CONTRAST-      11/12/2020 6:36 PM CST     HISTORY: Respiratory failure. Fall injury.     In order to have a CT radiation dose as low as reasonably achievable  Automated Exposure Control was utilized for adjustment of the mA and/or  KV according to patient size.     DLP in mGycm= 314.     Heart size is at the upper limits of normal.  Aortic calcification with no aneurysm.     Low-lying endotracheal tube with the tip 12 mm above the avery.     Granulomatous lymph node calcification within the middle mediastinum and  at the right hilum.     Bilateral infiltrate for which pneumonia is favored over pulmonary  edema.  No pneumothorax and no significant pleural effusion.     Summary:  1. Bilateral pneumonia.  2. Somewhat low-lying endotracheal tube with the tip 12 mm above the  avery.                                   This report was finalized on 11/12/2020 19:36 by   Tay Crisostomo MD.    CT Maxillofacial Without Contrast [[id number]] Collected: 11/12/20 1934     Updated: 11/12/20 1938    Narrative:      EXAMINATION: CT MAXILLOFACIAL WO CONT-      11/12/2020 6:36 PM CST     HISTORY: Fall injury. Head and face trauma.     In order to have a CT radiation dose as low as reasonably achievable  Automated Exposure Control was utilized for adjustment of the mA and/or  KV according to patient size.     DLP in mGycm= 158.     Endotracheal tube present.     Intact mandible and maxilla.  Intact nasal bones and zygomatic arches.     Bony orbits are intact.     Paranasal sinuses are essentially clear.     Summary:  1. No facial bone fracture.                                   This report was finalized on 11/12/2020 19:34 by Dr. Tay Crisostomo MD.    CT Cervical Spine Without Contrast [[id number]] Collected: 11/12/20 1932     Updated: 11/12/20 1937    Narrative:      EXAMINATION: CT CERVICAL SPINE WO CONTRAST-      11/12/2020 6:53 PM CST     HISTORY: Fall injury. Head and neck trauma.     In order to have a CT radiation dose as low as reasonably achievable  Automated Exposure Control was utilized for adjustment of the mA and/or  KV according to patient size.     DLP in mGycm= 374.     Axial, sagittal, and coronal noncontrast CT imaging.     Endotracheal tube present.     Vertebral bodies and posterior elements are intact.     Reconstructed sagittal images show normal curvature.   There is no malalignment. Prevertebral soft tissues are normal.   Facet joints align normally.     Reconstructed coronal images show normal lateral mass alignment.     C4-5, C5-6, and C6-7 discectomy with anterior plate and screw fusion.  Uncinate spurring with mild foraminal narrowing at C5-6 and C6-7.       Impression:      1. No acute fracture.     .                                   This report was finalized on 11/12/2020 19:33 by Dr. Tay Crisostomo MD.    CT Head Without Contrast [[id number]] Collected: 11/12/20 1923       Updated: 11/12/20 1927    Narrative:      EXAMINATION: CT HEAD WO CONTRAST-      11/12/2020 6:36 PM CST     HISTORY: Mental status change, unknown cause     In order to have a CT radiation dose as low as reasonably achievable  Automated Exposure Control was utilized for adjustment of the mA and/or  KV according to patient size.     DLP in mGycm= 640.     Noncontrast head CT compared with a brain MRI exam from 6/10/2020.     Axial, sagittal, and coronal noncontrast CT imaging of the head.     The visualized paranasal sinuses are clear.     The brain and ventricles have an age appropriate appearance.   There is no hemorrhage or mass-effect.   No acute infarction is seen.     No calvarial abnormality.       Impression:      1. No acute intracranial abnormality is seen.                                         This report was finalized on 11/12/2020 19:24 by Dr. Tay Crisostomo MD.    XR Knee 1 or 2 View Right [[id number]] Collected: 11/12/20 1703     Updated: 11/12/20 1706    Narrative:      EXAMINATION: XR KNEE 1 OR 2 VW RIGHT-     11/12/2020 4:58 PM CST     HISTORY: Right knee pain and swelling.     Right knee, 2 views.     Extensive chondrocalcinosis of the tibiofemoral compartments.     Intact distal femur and proximal tibia and fibula.  Intact patella.     No significant joint effusion.     Summary:  1. Osteoarthritic joint changes.  2. No acute bony abnormality.     This report was finalized on 11/12/2020 17:03 by Dr. Tay Crisostomo MD.    XR Chest 1 View [[id number]] Collected: 11/12/20 1701     Updated: 11/12/20 1705    Narrative:      EXAMINATION: XR CHEST 1 VW-     11/12/2020 4:49 PM CST     HISTORY: Simple Sepsis Protocol     1 view chest x-ray compared with 9/25/2020.     Endotracheal tube is present. The tip lies 18 mm above the avery.     Magnified heart size.     Interstitial lung disease with patchy bilateral infiltrate most  prominent in the left perihilar region.     Summary:  1. Endotracheal tube  present. The tip lies 18 mm above the avery.  2. Patchy bilateral pneumonia.  This report was finalized on 11/12/2020 17:02 by Dr. Tay Crisostomo MD.        I have personally reviewed and interpreted the radiology studies and ECG obtained at time of admission.     Assessment / Plan     Assessment:   Active Hospital Problems    Diagnosis   • Severe sepsis (CMS/[facility])     Impression:  1. Severe sepsis  2. Bilateral pneumonia  3. Acidosis  4. Transaminitis  5. Anemia  6. Thrombocytopenia  7. Rhabdomyolysis  8. Elevated inflammatory markers  9. Wounds POA  10. Acute hypoxic respiratory failure    Plan:   1.  Cefepime and vancomycin were given in ER, will continue  2.  Duo nebs  3.  ABG as needed  4.  IV fluids  5.  Labs in the morning   6.  Liver ultrasound   7. Repeat CK in the morning  8.  Cardiac echo  9. Skin precautions  10. Routine vent orders with pulmonology conuslt    Code Status: Full     I discussed the patient's findings and my recommendations with the staff    Estimated length of stay 5 to 6 days    Patient seen and examined by me on 11/12/2020    Electronically signed by Regan Hampton DO, 11/12/20, 20:58 CST.

## 2020-11-13 NOTE — CONSULTS
"Pharmacy Dosing Service  Pharmacokinetics  Vancomycin Initial Evaluation  Assessment/Action/Plan:  Loading dose?: 1250 mg   Current Order: Vancomycin 1250 mg IVPB every 12 hours  Current end date:11/15/20  Levels: deferred at this time  Additional antimicrobial agent(s): Cefepime    Vancomycin dosage initiated based on population pharmacokinetic parameters. Pharmacy will continue to follow daily and adjust dose accordingly.     Subjective:  Muriel Hernandez is a 60 y.o. female with a Vancomycin \"Pharmacy to Dose\" consult for the treatment of PNA   day 1 of 6 of treatment.    AUC Model Data:    Loading dose: N/A  Regimen: 1250 mg every 12 hours for 8 doses.  Start time: 04:08 on 11/13/2020  Exposure target: AUC24 (range)400-600 mg/L.hr  AUC24,ss: 550 mg/L.hr  PAUC*: 80 %  Ctrough,ss: 15.7 mg/L  Pconc*: 32 %  Tox.: 11 %    Objective:  Ht: 152.4 cm (60\"); Wt: 65.7 kg (144 lb 12.8 oz)  Estimated Creatinine Clearance: 98.7 mL/min (A) (by C-G formula based on SCr of 0.5 mg/dL (L)).   Creatinine   Date Value Ref Range Status   11/12/2020 0.50 (L) 0.57 - 1.00 mg/dL Final   09/25/2020 0.86 0.57 - 1.00 mg/dL Final   09/10/2020 0.56 (L) 0.57 - 1.00 mg/dL Final   06/10/2020 0.80 0.60 - 1.30 mg/dL Final     Comment:     Serial Number: [id number]:  [id number]   05/22/2020 0.6 0.5 - 0.9 mg/dL Final   02/25/2020 0.9 0.5 - 0.9 mg/dL Final   10/03/2019 0.8 0.5 - 0.9 mg/dL Final      Lab Results   Component Value Date    WBC 8.22 11/12/2020    WBC 7.41 09/25/2020    WBC 3.94 09/10/2020      Baseline culture results:  Microbiology Results (last 10 days)       Procedure Component Value - Date/Time    Influenza Antigen, Rapid - Swab, Nasopharynx [[id number]]  (Normal) Collected: 11/12/20 1653    Lab Status: Final result Specimen: Swab from Nasopharynx Updated: 11/12/20 1723     Influenza A Ag, EIA Negative     Influenza B Ag, EIA Negative    Narrative:      Recommend confirmation of negative results by viral culture or molecular " assay.    COVID PRE-OP / PRE-PROCEDURE SCREENING ORDER (NO ISOLATION) - Swab, Nasal Cavity [[id number]]  (Normal) Collected: 11/12/20 1653    Lab Status: Final result Specimen: Swab from Nasal Cavity Updated: 11/12/20 1730    Narrative:      The following orders were created for panel order COVID PRE-OP / PRE-PROCEDURE SCREENING ORDER (NO ISOLATION) - Swab, Nasal Cavity.  Procedure                               Abnormality         Status                     ---------                               -----------         ------                     COVID-19, ABBOTT IN-HOUS...[[id number]]  Normal              Final result                 Please view results for these tests on the individual orders.    COVID-19, ABBOTT IN-HOUSE,NP Swab (NO TRANSPORT MEDIA) 2 HR TAT - Swab, Nasal Cavity [[id number]]  (Normal) Collected: 11/12/20 1653    Lab Status: Final result Specimen: Swab from Nasal Cavity Updated: 11/12/20 1730     COVID19 Not Detected    Narrative:      Fact sheet for providers: https://www.fda.gov/media/911613/download     Fact sheet for patients: https://www.fda.gov/media/661717/download            Magda June [facility]  11/13/20 02:56 CST

## 2020-11-13 NOTE — SIGNIFICANT NOTE
This note also relates to the following rows which could not be included:  SpO2 - Cannot attach notes to unvalidated device data  Heart Rate - Cannot attach notes to unvalidated device data    1830 approx went to CT. Patient placed on continuous monitoring of ETCO2 and Saturations. Saturations remained constant at 100%through out transport and procedure. ETCO2 ranged between 24-32mmhg through out transport to, from, and during procedure. Patient bagged with 100% oxygen through out transports and procedure. Patient placed back on previous ventilator settings upon returning to room in ER. Patient tolerated all very well.

## 2020-11-13 NOTE — SIGNIFICANT NOTE
This note also relates to the following rows which could not be included:  BP - Cannot attach notes to unvalidated device data  Noninvasive MAP (mmHg) - Cannot attach notes to unvalidated device data    Transported to ccu with 100% ambu. Saturations and ETCO2 monitored continuously during transport. saturatons 100% constant with ETCO2 ranging between 26-34mmhg through out transport. Patient placed on previous ventilator setting upon arrival to ccu. Patient tolerated transport very well. Report given to Francoise Mcnally in flowsheet.

## 2020-11-13 NOTE — PROGRESS NOTES
Discharge Planning Assessment  [facility]     Patient Name: Muriel Hernandez  MRN: [id number]  Today's Date: 11/13/2020    Admit Date: 11/12/2020    Discharge Needs Assessment     Row Name 11/13/20 1148       Living Environment    Lives With  alone    Current Living Arrangements  home/apartment/condo    Primary Care Provided by  self    Provides Primary Care For  no one    Family Caregiver if Needed  other (see comments) Ex     Quality of Family Relationships  helpful;involved;supportive    Able to Return to Prior Arrangements  yes       Resource/Environmental Concerns    Resource/Environmental Concerns  none    Transportation Concerns  car, none       Transition Planning    Patient/Family Anticipates Transition to  home    Patient/Family Anticipated Services at Transition  none    Transportation Anticipated  family or friend will provide       Discharge Needs Assessment    Readmission Within the Last 30 Days  no previous admission in last 30 days    Concerns to be Addressed  no discharge needs identified;denies needs/concerns at this time    Anticipated Changes Related to Illness  none    Equipment Needed After Discharge  walker, rolling;oxygen    Discharge Coordination/Progress  Pt has RX coverage and a PCP. Pt [place] her ex [place], [place]. Pt has O2 at home 2L through [employer]. Pt is on vent at this time. SW will follow for needs.        Discharge Plan    No documentation.       Continued Care and Services - Admitted Since 11/12/2020    Coordination has not been started for this encounter.         Demographic Summary    No documentation.       Functional Status    No documentation.       Psychosocial    No documentation.       Abuse/Neglect    No documentation.       Legal    No documentation.       Substance Abuse    No documentation.       Patient Forms    No documentation.           Radha Owusu

## 2020-11-14 PROBLEM — M62.82 RHABDOMYOLYSIS: Status: ACTIVE | Noted: 2020-01-01

## 2020-11-14 PROBLEM — E87.20 LACTIC ACIDOSIS: Status: ACTIVE | Noted: 2020-01-01

## 2020-11-14 NOTE — PROGRESS NOTES
"  PROGRESS NOTE  Patient Name: Muriel Hernandez  Age/Sex: 60 y.o. female  : 1960  MRN: [id number]    Date of Admission: 2020  Date of Encounter Visit: 20   LOS: 2 days   Patient Care Team:  Real Alicea MD as PCP - General (Family Medicine)  Curtis Lynch III, MD as Consulting Physician (Radiation Oncology)  Julio Cesar Nguyen MD as Cardiologist (Cardiology)  Jovan Trejo MD as Referring Physician (Otolaryngology)    Chief Complaint: Intubated on the ventilator, arousable    Hospital course: Patient came in with altered mental status, possible COPD,Has underlying severe pulmonary hypertension, did have some thrombocytopenia with possible alcoholism, especially with inverted AST/ALT ratio.  Currently intubated on the ventilator, does not seem ready for continuous breathing yet.  Had fever with sepsis, and is currently on cefepime and vancomycin.  Her chest x-ray and CAT scan showed scattered pulmonary infiltrate        REVIEW OF SYSTEMS:   CONSTITUTIONAL: Low-grade fever  Limited system review while on the ventilator  Ventilator/Non-Invasive Ventilation Settings (From admission, onward)     Start     Ordered    20 1706  Ventilator - AC/VC; 90%; 5  Continuous     Question Answer Comment   Vent Mode AC/VC    FiO2 titrate for Sp02% =/> 90%    PEEP 5        20 1705                  Vital Signs  Temp:  [98.8 °F (37.1 °C)-100.7 °F (38.2 °C)] 100.2 °F (37.9 °C)  Heart Rate:  [107-126] 116  Resp:  [16-20] 16  BP: ()/(18-85) 120/67  FiO2 (%):  [30 %-40 %] 40 %  SpO2:  [84 %-100 %] 99 %  on    Device (Oxygen Therapy): ventilator    Intake/Output Summary (Last 24 hours) at 2020 0710  Last data filed at 2020 0458  Gross per 24 hour   Intake 3318 ml   Output 1050 ml   Net 2268 ml     Flowsheet Rows      First Filed Value   Admission Height  152.4 cm (60\") Documented at 2020 1622   Admission Weight  64.4 kg (142 lb) Documented at 2020 1549        Body mass " index is 28.22 kg/m².      11/13/20  0200 11/13/20  1151 11/14/20  0458   Weight: 65.7 kg (144 lb 12.8 oz) 65.3 kg (144 lb) 65.5 kg (144 lb 8 oz)       Physical Exam:  GEN:  Orally intubated, on the ventilator, arousable but did not follow commands   EYES:   Sclerae clear. No icterus. PERRL. Normal EOM  ENT:   External ears/nose normal, no oral lesions, no thrush, mucous membranes moist, oral ET tube  NECK:  Supple, midline trachea, no JVD  LUNGS: Normal chest on inspection, positive crackles over the bases posteriorly . Respirations regular, slightly tachypneic, reduced lung compliance  CV:  Regular rhythm and rate. Normal S1/S2. No murmurs, gallops, or rubs noted.  ABD:  Soft, nontender and nondistended. Normal bowel sounds. No guarding  EXT:  Moves all extremities well. No cyanosis. No redness.  Positive pitting edema mainly over the hip area.   Skin: Dry, intact, no bleeding    Results Review:    Results From Last 14 Days   Lab Units 11/13/20  1420 11/13/20  0340 11/12/20 2003 11/12/20  1611   CRP mg/dL  --   --   --  3.52*   D DIMER QUANT mg/L (FEU)  --   --   --  3.12*   LACTATE mmol/L 2.9* 2.1* 2.7* 5.9*   TRIGLYCERIDES mg/dL  --  80  --   --      Results from last 7 days   Lab Units 11/14/20  0500 11/13/20  1420 11/13/20  0340 11/12/20  1611   SODIUM mmol/L 148* 146* 145 139   POTASSIUM mmol/L 3.8 4.5 3.8 4.2   CHLORIDE mmol/L 112* 110* 109* 100   CO2 mmol/L 24.0 24.0 26.0 25.0   BUN mg/dL 17 21 22 29*   CREATININE mg/dL 0.53* 0.59 0.42* 0.50*   CALCIUM mg/dL 8.8 9.1 9.6 10.5   AST (SGOT) U/L 137* 163* 158* 169*   ALT (SGPT) U/L 46* 50* 46* 45*   ANION GAP mmol/L 12.0 12.0 10.0 14.0   ALBUMIN g/dL 3.40* 3.90 3.70 4.40     Results from last 7 days   Lab Units 11/14/20  0500 11/13/20  1420 11/13/20  0340 11/13/20  0049   CK TOTAL U/L 660* 736* 996*  --    TROPONIN T ng/mL  --  0.062* 0.064* 0.075*         Results from last 7 days   Lab Units 11/13/20  1420   PROBNP pg/mL 29,428.0*     Results from last 7 days    Lab Units 11/14/20  0500 11/13/20  1420 11/13/20  0340 11/12/20  1611   WBC 10*3/mm3 10.05 10.22 8.31 8.22   HEMOGLOBIN g/dL 7.6* 9.0* 8.7* 9.6*   HEMATOCRIT % 24.6* 29.8* 27.8* 31.0*   PLATELETS 10*3/mm3 54* 68* 64* 73*   MCV fL 81.5 83.2 80.6 81.8   NEUTROPHIL % %  --   --   --  85.4*   LYMPHOCYTE % %  --   --   --  4.3*   MONOCYTES % %  --   --   --  9.0   EOSINOPHIL % %  --   --   --  0.1*   BASOPHIL % %  --   --   --  0.1     Results from last 7 days   Lab Units 11/12/20  1611   INR  2.78*   APTT seconds 48.4*     Results from last 7 days   Lab Units 11/12/20  1611   MAGNESIUM mg/dL 2.0     Results from last 7 days   Lab Units 11/13/20  0340   CHOLESTEROL mg/dL 69   TRIGLYCERIDES mg/dL 80   HDL CHOL mg/dL 21*     Results from last 7 days   Lab Units 11/14/20  0035 11/13/20  1408 11/13/20  0405 11/12/20  2030 11/12/20  1550   PH, ARTERIAL pH units 7.408 7.298* 7.424 7.309* 7.301*   PCO2, ARTERIAL mm Hg 38.2 48.3* 38.5 47.9* 47.5*   PO2 ART mm Hg 115.0* 363.0* 63.4* 83.1 56.0*   HCO3 ART mmol/L 24.1 23.6 25.2 24.0 23.4         No results found for: POCGLU  Results from last 7 days   Lab Units 11/13/20  1420 11/13/20  0340 11/12/20  2003 11/12/20  1611   LACTATE mmol/L 2.9* 2.1* 2.7* 5.9*     Results from last 7 days   Lab Units 11/13/20  0933 11/12/20  1720 11/12/20  1611   BLOODCX   --  No growth at 24 hours No growth at 24 hours   STREP PNEUMO AG  Negative  --   --    L. PNEUMOPHILA SEROGP 1 UR AG  Negative  --   --      Results from last 7 days   Lab Units 11/12/20  1720   NITRITE UA  Negative   WBC UA /HPF None Seen   BACTERIA UA /HPF None Seen   SQUAM EPITHEL UA /HPF None Seen     Results from last 7 days   Lab Units 11/13/20  0746   ADENOVIRUS DETECTION BY PCR  Not Detected   CORONAVIRUS 229E  Not Detected   CORONAVIRUS HKU1  Not Detected   CORONAVIRUS NL63  Not Detected   CORONAVIRUS OC43  Not Detected   HUMAN METAPNEUMOVIRUS  Not Detected   HUMAN RHINOVIRUS/ENTEROVIRUS  Not Detected   INFLUENZA B PCR   Not Detected   PARAINFLUENZA 1  Not Detected   PARAINFLUENZA VIRUS 2  Not Detected   PARAINFLUENZA VIRUS 3  Not Detected   PARAINFLUENZA VIRUS 4  Not Detected   BORDETELLA PERTUSSIS PCR  Not Detected   BORDETELLA PARAPERTUSSIS PCR  Not Detected   [id number]  Not Detected   CHLAMYDOPHILA PNEUMONIAE PCR  Not Detected   MYCOPLAMA PNEUMO PCR  Not Detected   INFLUENZA A H3  Not Detected   INFLUENZA A H1  Not Detected   RSV, PCR  Not Detected     Results from last 7 days   Lab Units 11/13/20  0933   URINE MYOGLOBIN, QUALITATIVE  Positive*           Echocardiogram 11/13/2020:  · Estimated left ventricular EF = 55% Left ventricular systolic function is normal.  · Severe pulmonary hypertension is present.  · Estimated right ventricular systolic pressure from tricuspid regurgitation is markedly elevated (>55 mmHg).  57 mmHg  · Moderate tricuspid valve regurgitation is present.  · The right ventricular cavity is mild to moderately dilated with mildly reduced systolic function.  · The right atrial cavity is dilated.  · No significant change from prior exam 7/27/2020.  Imaging:   Imaging Results (All)     Procedure Component Value Units Date/Time         I reviewed the patient's new clinical results.  I personally viewed and interpreted the patient's imaging results:        Medication Review:   budesonide, 0.5 mg, Nebulization, BID - RT  cefepime, 2 g, Intravenous, Q8H  chlorhexidine, 15 mL, Mouth/Throat, Q12H  famotidine, 20 mg, Intravenous, BID  ipratropium-albuterol, 3 mL, Nebulization, Q4H - RT  metoprolol tartrate, 25 mg, Nasogastric, Q12H  nicotine, 1 patch, Transdermal, Q24H  sodium chloride, 10 mL, Intravenous, Q12H  vancomycin, 1,250 mg, Intravenous, Q12H        dexmedetomidine, 0.2-1.5 mcg/kg/hr  fentanyl, 0.7 mcg/kg/hr, Last Rate: Stopped (11/13/20 1400)  lactated ringers, 125 mL/hr, Last Rate: 125 mL/hr (11/14/20 0523)        ASSESSMENT:   1. Acute hypoxemic respiratory failure  2. Thrombocytopenia  3. Tobacco  abuse  4. Probable COPD  5. Anemia  6. Rhabdomyolysis  7. Hypothermia  8. Severe pulmonary hypertension    PLAN:  Patient is not ready to come off the ventilator at this point, she is arousable, she can track with her eyes and she responds to physical stimulation but does not follow commands.  Hemoglobin is trending down, no sign of active bleeding continue to monitor  She had a mild degree of rhabdomyolysis, unlikely to have any clinical impact on the kidney function  Liver enzymes are trending down and are only mildly elevated  Need to be very careful with volume overload given hypertension.  Ventilator settings were adjusted, will continue to follow  Continue with the cefepime, consider discontinuing vancomycin given the negative cultures so far of any MRSA         Keyon Wallis MD  11/14/20  07:10 CST           Dictated utilizing Dragon dictation

## 2020-11-14 NOTE — PROGRESS NOTES
[facility]  INPATIENT PROGRESS NOTE    Patient Name: Muriel Hernandez  Date of Admission: 11/12/2020  Today's Date: 11/14/20  Length of Stay: 2  Primary Care Physician: Real Alicea MD    Subjective   Chief Complaint: Respiratory failure.   HPI   Hypothermic at presentation.  Warmed.     Continues to require ventilatory support. Pulmonary not ready to wean.     Still some concern on my part for possible COVID. Will swab again this morning. Nursing aware.     Review of Systems   All pertinent negatives and positives are as above. All other systems have been reviewed and are negative unless otherwise stated.     Objective    Temp:  [98.8 °F (37.1 °C)-100.7 °F (38.2 °C)] 100 °F (37.8 °C)  Heart Rate:  [107-126] 116  Resp:  [16-20] 18  BP: ()/(18-85) 115/61  FiO2 (%):  [30 %-40 %] 40 %    Intake/Output Summary (Last 24 hours) at 11/14/2020 0830  Last data filed at 11/14/2020 0742  Gross per 24 hour   Intake 3322.2 ml   Output 1075 ml   Net 2247.2 ml     Truncated exam undertaken as I still have some suspicion for COVID for the patient.  She is currently sedate and ventilating without difficulty.  She is slightly tachycardic, but has a good blood pressure on no pressors.  She appears comfortable.    Results Review:  I have reviewed the labs, radiology results, and diagnostic studies.    Laboratory Data:   Results from last 7 days   Lab Units 11/14/20  0500 11/13/20 1420 11/13/20  0340   WBC 10*3/mm3 10.05 10.22 8.31   HEMOGLOBIN g/dL 7.6* 9.0* 8.7*   HEMATOCRIT % 24.6* 29.8* 27.8*   PLATELETS 10*3/mm3 54* 68* 64*     Results from last 7 days   Lab Units 11/14/20  0500 11/13/20  1420 11/13/20  0340   SODIUM mmol/L 148* 146* 145   POTASSIUM mmol/L 3.8 4.5 3.8   CHLORIDE mmol/L 112* 110* 109*   CO2 mmol/L 24.0 24.0 26.0   BUN mg/dL 17 21 22   CREATININE mg/dL 0.53* 0.59 0.42*   CALCIUM mg/dL 8.8 9.1 9.6   BILIRUBIN mg/dL 2.5* 2.3* 2.4*   ALK PHOS U/L  126* 134* 119*   ALT (SGPT) U/L 46* 50* 46*   AST (SGOT) U/L 137* 163* 158*   GLUCOSE mg/dL 112* 117* 97     Results from last 7 days   Lab Units 11/14/20  0500 11/13/20  1420 11/13/20  0340   CK TOTAL U/L 660* 736* 996*     Lab Results   Lab Value Date/Time    TROPONINT 0.062 (C) 11/13/2020 1420    TROPONINT 0.064 (C) 11/13/2020 0340    TROPONINT 0.075 (C) 11/13/2020 0049    TROPONINT 0.071 (C) 11/12/2020 2227     Lactate (mmol/L)   Date/Time Value   11/13/2020 1420 2.9 (C)   11/13/2020 0340 2.1 (C)   11/12/2020 2003 2.7 (C)     Culture Data:   Blood Culture   Date Value Ref Range Status   11/12/2020 No growth at 24 hours  Preliminary   11/12/2020 No growth at 24 hours  Preliminary     Radiology Data:   Imaging Results (Last 24 Hours)     Procedure Component Value Units Date/Time    XR Chest 1 View [[id number]] Resulted: 11/14/20 0423     Updated: 11/14/20 0424    US Liver [[id number]] Collected: 11/13/20 1544     Updated: 11/13/20 1557    Narrative:      US LIVER-11/13/2020 1:07 PM CST     REASON FOR EXAM: Elevated enzymes; A41.9-Sepsis, unspecified organism;  R65.20-Severe sepsis without septic shock; J18.9-Pneumonia, unspecified  organism; J96.01-Acute respiratory failure with hypoxia;  M62.82-Rhabdomyolysis; D68.9-Coagulation defect, unspecified       COMPARISON: NONE      TECHNIQUE: Multiple longitudinal and transverse realtime sonographic  images of the right upper quadrant of the abdomen are obtained.      FINDINGS:    Pancreas: Normal in size and echogenicity.      Liver: Normal in size, echogenicity and echotexture. No focal lesion.  There appears to be bidirectional flow within the portal vein without  evidence of portal venous thrombosis. There is increased echogenicity of  the liver parenchyma in comparison the right kidney suggesting mild  steatosis of the liver. No evidence of focal hepatic mass.     Gallbladder: No intraluminal echoes, wall thickening, or pericholecystic  fluid.      Bile ducts: The  CBD measures 0.6 cm in diameter. There is no  intrahepatic or extrahepatic ductal dilation.      Right kidney: Normal in size, shape and contour. No mass, hydronephrosis  or calculi. No perinephric fluid collection.       Other: The visualized abdominal aorta is normal in caliber.        Impression:         1. Increased echogenicity of liver parenchyma suggesting steatosis of  the liver. No evidence of focal hepatic mass.  2. Normal appearance of the gallbladder.        This report was finalized on 11/13/2020 15:54 by Dr. Lorenzo Aquino MD.    XR Chest 1 View [[id number]] Collected: 11/13/20 1530     Updated: 11/13/20 1537    Narrative:      EXAMINATION: Chest 1 view 11/13/2020     HISTORY: Hypoxemia     FINDINGS: Today's exam is compared to previous study of earlier same  day. There is persistent left lingular and left lower lobe infiltrate as  well as right upper lobe infiltrate abutting the fissure. These are  stable taking into account some differences in technique. The  endotracheal tube is slightly more advanced with the tip at the avery  pointing towards right mainstem bronchus. An NG tube is been advanced  and is within the stomach.       Impression:      . Left lingular and left lower lobe infiltrate as well as  right upper limit lobe infiltrate stable from today's earlier exam.  2. Endotracheal tube is gone slightly advanced with the tip at the  avery and pointed towards right mainstem bronchus. I notified the  nursing staff in the CCU of the endotracheal tube position and  recommended partial withdrawal at 3:33 PM.  This report was finalized on 11/13/2020 15:34 by Dr. Lorenzo Aquino MD.    US Venous Doppler Lower Extremity Bilateral (duplex) [[id number]] Collected: 11/13/20 1507     Updated: 11/13/20 1510    Narrative:      History: Swelling       Impression:      Impression: There is no evidence of deep venous thrombosis or  superficial thrombophlebitis of right or left lower extremities.        Comments: Bilateral lower extremity venous duplex exam was performed  using color Doppler flow, Doppler waveform analysis, and grayscale  imaging, with and without compression. There is no evidence of deep  venous thrombosis in the common femoral, superficial femoral, popliteal,  peroneal, anterior tibial, and posterior tibial veins bilaterally. No  thrombus is identified in the saphenofemoral junctions and greater  saphenous veins bilaterally.         This report was finalized on 11/13/2020 15:07 by Dr. Arvind Hoang MD.        Results from last 7 days   Lab Units 11/14/20  0035   PH, ARTERIAL pH units 7.408   PO2 ART mm Hg 115.0*   PCO2, ARTERIAL mm Hg 38.2   HCO3 ART mmol/L 24.1   FiO2 (%):  [30 %-40 %] 40 %  S RR:  [16-18] 18  PEEP/CPAP (cm H2O):  [5 cm H20] 5 cm H20  MA SUP:  [0 cm H20] 0 cm H20  MAP (cm H2O):  [5.8-15] 14    I have reviewed the patient's current medications.     Assessment/Plan     Active Hospital Problems    Diagnosis   • **Bilateral pneumonia   • Severe sepsis (CMS/HCC)   • Acute respiratory failure with hypoxia (CMS/HCC)   • Transaminitis   • Thrombocytopenia (CMS/HCC)   • Hypothermia   • Seizures (CMS/HCC)   • Current every day smoker   • COPD (chronic obstructive pulmonary disease) (CMS/HCC)   • Chronic pain   • Anemia     Plan:  The patient was admitted on 11/12 Dr. Hampton.  She presented after an unresponsive episode.  She was found at home by her family.  She was hypothermic.  She had a work-up and imaging in the emergency department that found her to have bilateral pneumonia.  She has required mechanical ventilation since presentation.    Pulmonary continues to follow.  They have seen her today.  Still not appropriate for a weaning trial according to their notations.  Continue sedation with Precedex and fentanyl.    COVID-19 testing negative.  I still have some concern that her presentation could be consistent with such.  She was initially swabbed with an Abbott test.  I plan to  have her swabbed again this morning with a Cepheid. Expanded respiratory PCR panel negative. Influenza negative.  Blood cultures with no growth to date.  Streptococcal and Legionella urinary antigens negative.  No MRSA detected on nasal screen.  Sputum culture with budding yeast.  Continue vancomycin and cefepime for now.  Consider de-escalation of vancomycin soon given negative MRSA nasal screen.    She very likely has had some mild rhabdomyolysis given her presentation.  CPK downtrending.  Troponin was slightly elevated and the trend was flat. Transaminases downtrending.  Ultrasound of the liver shows steatosis.    She is thrombocytopenic.  She does not appear to have chronic thrombocytopenia.  Continue to follow.  This is likely secondary to sepsis.    Nicotine patch applied.    Pepcid for peptic ulcer prophylaxis.    SCDs for DVT prophylaxis.    Electronically signed by Jovan Juarez DO, 11/14/20, 08:23 CST.

## 2020-11-14 NOTE — PROGRESS NOTES
"Pharmacy Dosing Service  Pharmacokinetics  Vancomycin Follow-up Evaluation    Assessment/Action/Plan:  Current Order: Vancomycin 1250 mg IVPB every 12 hours  Current end date:11/15/2020  Levels: 11/14/2020 0500 Vancomycin trough = 22.7 (~12 hours post dose)  Additional antimicrobial agent(s): Cefepime  Vancomycin dose adjusted to 750mg iv q12h  Pharmacy will continue to follow daily and adjust dose accordingly.     Subjective:  Muriel Hernandez is a 60 y.o. female currently on Vancomycin for the treatment of pneumonia, day 2 of 3 of treatment.    AUC Model Data:  Regimen: 750 mg every 12 hours  Start time: 18:00 on 11/14/2020  Exposure target: AUC24 (range)400-600 mg/L.hr  AUC24,ss: 489 mg/L.hr  PAUC*: 89 %  Ctrough,ss: 14 mg/L  Pconc*: 5 %  Tox.: 9 %    Objective:  Ht: 152.4 cm (60\"); Wt: 65.5 kg (144 lb 8 oz)  Estimated Creatinine Clearance: 93 mL/min (A) (by C-G formula based on SCr of 0.53 mg/dL (L)).   Creatinine   Date Value Ref Range Status   11/14/2020 0.53 (L) 0.57 - 1.00 mg/dL Final   11/13/2020 0.59 0.57 - 1.00 mg/dL Final   11/13/2020 0.42 (L) 0.57 - 1.00 mg/dL Final   06/10/2020 0.80 0.60 - 1.30 mg/dL Final     Comment:     Serial Number: [id number]:  [id number]   05/22/2020 0.6 0.5 - 0.9 mg/dL Final   02/25/2020 0.9 0.5 - 0.9 mg/dL Final   10/03/2019 0.8 0.5 - 0.9 mg/dL Final      Lab Results   Component Value Date    WBC 10.05 11/14/2020    WBC 10.22 11/13/2020    WBC 8.31 11/13/2020         Lab Results   Component Value Date    VANCOTROUGH 22.70 (H) 11/14/2020       Culture Results:  Microbiology Results (last 10 days)       Procedure Component Value - Date/Time    Respiratory Culture - Sputum, ET Suction [[id number]] Collected: 11/13/20 2154    Lab Status: Preliminary result Specimen: Sputum from ET Suction Updated: 11/13/20 6310     Gram Stain Rare (1+) WBCs seen      Many (4+) Budding yeast with pseudohyphae    S. Pneumo Ag Urine or CSF - Urine, Urine, Catheter [[id number]]  (Normal) " Collected: 11/13/20 0933    Lab Status: Final result Specimen: Urine, Catheter Updated: 11/13/20 1059     Strep Pneumo Ag Negative    Legionella Antigen, Urine - Urine, Urine, Catheter [[id number]]  (Normal) Collected: 11/13/20 0933    Lab Status: Final result Specimen: Urine, Catheter Updated: 11/13/20 1059     LEGIONELLA ANTIGEN, URINE Negative    MRSA Screen, PCR (Inpatient) - Swab, Nares [[id number]]  (Normal) Collected: 11/13/20 0746    Lab Status: Final result Specimen: Swab from Nares Updated: 11/13/20 0955     MRSA PCR No MRSA Detected    Respiratory Panel, PCR (WITHOUT COVID) - Swab, Nasopharynx [[id number]]  (Normal) Collected: 11/13/20 0746    Lab Status: Final result Specimen: Swab from Nasopharynx Updated: 11/13/20 0901     ADENOVIRUS, PCR Not Detected     Coronavirus 229E Not Detected     Coronavirus HKU1 Not Detected     Coronavirus NL63 Not Detected     Coronavirus OC43 Not Detected     Human Metapneumovirus Not Detected     Human Rhinovirus/Enterovirus Not Detected     Influenza B PCR Not Detected     Parainfluenza Virus 1 Not Detected     Parainfluenza Virus 2 Not Detected     Parainfluenza Virus 3 Not Detected     Parainfluenza Virus 4 Not Detected     Bordetella pertussis pcr Not Detected     Influenza A H1 2009 PCR Not Detected     Chlamydophila pneumoniae PCR Not Detected     Mycoplasma pneumo by PCR Not Detected     Influenza A PCR Not Detected     Influenza A H3 Not Detected     Influenza A H1 Not Detected     RSV, PCR Not Detected     Bordetella parapertussis PCR Not Detected    Narrative:      The coronavirus on the RVP is NOT COVID-19 and is NOT indicative of infection with COVID-19.     Blood Culture - Blood, Arm, Right [[id number]] Collected: 11/12/20 1720    Lab Status: Preliminary result Specimen: Blood from Arm, Right Updated: 11/13/20 1745     Blood Culture No growth at 24 hours    Influenza Antigen, Rapid - Swab, Nasopharynx [[id number]]  (Normal) Collected: 11/12/20 1653    Lab Status:  Final result Specimen: Swab from Nasopharynx Updated: 11/12/20 1723     Influenza A Ag, EIA Negative     Influenza B Ag, EIA Negative    Narrative:      Recommend confirmation of negative results by viral culture or molecular assay.    COVID PRE-OP / PRE-PROCEDURE SCREENING ORDER (NO ISOLATION) - Swab, Nasal Cavity [[id number]]  (Normal) Collected: 11/12/20 1653    Lab Status: Final result Specimen: Swab from Nasal Cavity Updated: 11/12/20 1730    Narrative:      The following orders were created for panel order COVID PRE-OP / PRE-PROCEDURE SCREENING ORDER (NO ISOLATION) - Swab, Nasal Cavity.  Procedure                               Abnormality         Status                     ---------                               -----------         ------                     COVID-19, [facility]BBOTT IN-HOUS...[[id number]]  Normal              Final result                 Please view results for these tests on the individual orders.    COVID-19, ABBOTT IN-HOUSE,NP Swab (NO TRANSPORT MEDIA) 2 HR TAT - Swab, Nasal Cavity [[id number]]  (Normal) Collected: 11/12/20 1653    Lab Status: Final result Specimen: Swab from Nasal Cavity Updated: 11/12/20 1730     COVID19 Not Detected    Narrative:      Fact sheet for providers: https://www.fda.gov/media/078010/download     Fact sheet for patients: https://www.fda.gov/media/563335/download    Blood Culture - Blood, Arm, Left [[id number]] Collected: 11/12/20 1611    Lab Status: Preliminary result Specimen: Blood from Arm, Left Updated: 11/13/20 1630     Blood Culture No growth at 24 hours            Edward Cuadra, PharmD   11/14/20 08:10 CST

## 2020-11-15 NOTE — PLAN OF CARE
Goal Outcome Evaluation:  Plan of Care Reviewed With: patient  Progress: no change  Outcome Summary: pt no signs of discomfort. pt elevated respiratory rate noted, abg's drawn, no criticals noted. prn ativan given. pt will arouse to voice and look at you and withdraw from pain, but will not follow commands. IV atb continue. vss. pt resting comfortably  Problem: Skin Injury Risk Increased  Goal: Skin Health and Integrity  Outcome: Ongoing, Progressing  Intervention: Optimize Skin Protection  Recent Flowsheet Documentation  Taken 11/15/2020 0400 by Martinez Terrell, RN  Pressure Reduction Techniques: weight shift assistance provided  Head of Bed (HOB): \[facility]\"" elevated  Pressure Reduction Devices: pressure-redistributing mattress utilized  Skin Protection:   adhesive use limited   incontinence pads utilized  Taken 11/15/2020 0300 by Martinez Terrell, RN  Head of Bed (\[facility]\""): HOB elevated  Taken 11/15/2020 0200 by Martinez Terrell RN  Head of Bed (\[facility]\""): HOB elevated  Taken 11/15/2020 0100 by Martinez Terrell RN  Head of Bed (\[facility]\""): HOB elevated  Taken 11/15/2020 0000 by Martinez Terrell, RN  Pressure Reduction Techniques: weight shift assistance provided  Head of Bed (\[facility]\""): \[facility]\"" elevated  Pressure Reduction Devices: pressure-redistributing mattress utilized  Skin Protection: adhesive use limited  Taken 11/14/2020 2300 by Martinez Terrell, RN  Head of Bed (\[facility]\""): HOB elevated  Taken 11/14/2020 2200 by Martinez Terrell RN  Head of Bed (\[facility]\""): HOB elevated  Taken 11/14/2020 2000 by Martinez Terrell, RN  Pressure Reduction Techniques: weight shift assistance provided  Head of Bed (\[facility]\""): \[facility]\"" elevated  Pressure Reduction Devices: pressure-redistributing mattress utilized  Skin Protection:   adhesive use limited   incontinence pads utilized     Problem: Fall Injury Risk  Goal: Absence of Fall and Fall-Related Injury  Outcome: Ongoing, Progressing  Intervention: Promote Injury-Free Environment  Recent Flowsheet Documentation  Taken  11/15/2020 0400 by Martinez Terrell RN  Safety Promotion/Fall Prevention: safety round/check completed  Taken 11/15/2020 0300 by Martinez Terrell RN  Safety Promotion/Fall Prevention: safety round/check completed  Taken 11/15/2020 0200 by Martinez Terrell RN  Safety Promotion/Fall Prevention: safety round/check completed  Taken 11/15/2020 0100 by Martinez Terrell RN  Safety Promotion/Fall Prevention: safety round/check completed  Taken 11/15/2020 0000 by Martinez Terrell RN  Safety Promotion/Fall Prevention: safety round/check completed  Taken 11/14/2020 2300 by Martinez Terrell RN  Safety Promotion/Fall Prevention: safety round/check completed  Taken 11/14/2020 2200 by Martinez Terrell RN  Safety Promotion/Fall Prevention: safety round/check completed  Taken 11/14/2020 2000 by Martinez Terrell RN  Safety Promotion/Fall Prevention: safety round/check completed     Problem: Restraint, Nonbehavioral (Nonviolent)  Goal: Discontinuation Criteria Achieved  Outcome: Ongoing, Progressing  Intervention: Implement Least-restrictive Safety Strategies  Recent Flowsheet Documentation  Taken 11/15/2020 0400 by Martinez Terrell RN  Medical Device Protection: IV pole/bag removed from visual field  Taken 11/15/2020 0000 by Martinez Terrell RN  Medical Device Protection: IV pole/bag removed from visual field  Taken 11/14/2020 2000 by Martinez Terrell RN  Medical Device Protection: IV pole/bag removed from visual field  Goal: Personal Dignity and Safety Maintained  Outcome: Ongoing, Progressing  Intervention: Protect Dignity, Rights, and Personal Wellbeing  Recent Flowsheet Documentation  Taken 11/14/2020 2000 by Martinez Terrell RN  Trust Relationship/Rapport:   care explained   choices provided   emotional support provided   reassurance provided  Intervention: Protect Skin and Joint Integrity  Recent Flowsheet Documentation  Taken 11/15/2020 0400 by Martinez Terrell, RN  Body Position:   turned   tilted, left  Taken  11/15/2020 0300 by Martinez Terrell RN  Body Position: position maintained  Taken 11/15/2020 0200 by Martinez Terrell RN  Body Position:   turned   supine  Taken 11/15/2020 0100 by Martinez Terrell RN  Body Position: position maintained  Taken 11/15/2020 0000 by Martinez Terrell RN  Body Position: position maintained  Taken 11/14/2020 2300 by Martinez Terrell RN  Body Position: position maintained  Taken 11/14/2020 2200 by Martinez Terrell RN  Body Position:   turned   supine  Taken 11/14/2020 2000 by Martinez Terrell RN  Body Position:   turned   tilted, right     Problem: Restraint, Nonbehavioral (Nonviolent)  Goal: Personal Dignity and Safety Maintained  Outcome: Ongoing, Progressing  Intervention: Protect Dignity, Rights, and Personal Wellbeing  Recent Flowsheet Documentation  Taken 11/14/2020 2000 by Martinez Terrell RN  Trust Relationship/Rapport:   care explained   choices provided   emotional support provided   reassurance provided  Intervention: Protect Skin and Joint Integrity  Recent Flowsheet Documentation  Taken 11/15/2020 0400 by Martinez Terrell RN  Body Position:   turned   tilted, left  Taken 11/15/2020 0300 by Martinez Terrell RN  Body Position: position maintained  Taken 11/15/2020 0200 by Martinez Terrell RN  Body Position:   turned   supine  Taken 11/15/2020 0100 by Martinez Terrell RN  Body Position: position maintained  Taken 11/15/2020 0000 by Martinez Terrell RN  Body Position: position maintained  Taken 11/14/2020 2300 by Martinez Terrell RN  Body Position: position maintained  Taken 11/14/2020 2200 by Martinez Terrell RN  Body Position:   turned   supine  Taken 11/14/2020 2000 by Martinez Terrell RN  Body Position:   turned   tilted, right

## 2020-11-15 NOTE — CONSULTS
[facility] Oncology/Hematology Services  CONSULT NOTE    PATIENT NAME:  Muriel Hernandez  YOB: 1960  PATIENT MRN:  [id number]    Date of Admission:  11/12/2020  Consultation Date:  11/15/2020  Referring Provider: No ref. provider found    Subjective     Reason for Consultation: Anemia and thrombocytopenia    History of present illness: Muriel Hernandez 60 y.o. female who was found on the floor of her home covered in feces on the day of admission, had agonal breathing and was hypothermic.  The patient has a history of COPD, polysubstance abuse, chronic hyponatremia, TIA, skin cancer and questionable alcoholism.  She was brought to the emergency room and subsequently intubated and was treated with cefepime and vancomycin in the ER.  Since her admission, the patient has been treated for bilateral pneumonia and remains on mechanical ventilation.  She was noted to have thrombocytopenia and anemia and therefore hematology called.  Upon review of the patient's previous labs from 2019, her anemia is not new and platelets were in the low normal range.    As per RN, she lives alone but family stating that she is alcohol dependent, in addition to benzo and opiate dependence    Past Medical History:  Past Medical History:   Diagnosis Date   • Anxiety    • Arthritis    • Asthma    • Cancer (CMS/HCC)     skin   • Chronic back pain    • COPD (chronic obstructive pulmonary disease) (CMS/HCC)    • GERD (gastroesophageal reflux disease)    • Glaucoma    • Hypertension    • IBS (irritable bowel syndrome)    • Neoplasm of uncertain behavior     nasal tip   • On home oxygen therapy    • Skin cancer    • TIA (transient ischemic attack)    • UTI (urinary tract infection)      Prior Surgeries:  Past Surgical History:   Procedure Laterality Date   • BREAST SURGERY Left 1982    NO LYMPH NODES REMOVED   • CERVICAL FUSION     • COLONOSCOPY     • FLAP HEAD/NECK N/A 9/15/2020    Procedure: flap inset;   Surgeon: Jovan Trejo MD;  Location:  [facility] OR;  Service: ENT;  Laterality: N/A;   • HEAD/NECK LESION/CYST EXCISION Bilateral 8/4/2020    Procedure: 1) 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, 3.2 cm x 3.2 cm   2) paramedian forehead flap reconstruction of nasal tip with preservation of vascular pedicle   3) complex closure skin of forehead and scalp, 10.0 cm;  Surgeon: Jovan Trejo MD;  Location:  [facility] OR;  Service: ENT;  Laterality: Bilateral;   • HEAD/NECK LESION/CYST EXCISION N/A 9/15/2020    Procedure: Pedicle division and flap inset;  Surgeon: Jovan Trejo MD;  Location:  [facility] OR;  Service: ENT;  Laterality: N/A;   • HYSTERECTOMY     • KNEE SURGERY     • NOSE SURGERY          Allergies:Sulfa antibiotics and Local [lidocaine]  PTA Meds:  Medications Prior to Admission   Medication Sig Dispense Refill Last Dose   • albuterol sulfate  (90 Base) MCG/ACT inhaler Inhale 2 puffs Every 6 (Six) Hours As Needed for Wheezing.   Unknown at Unknown time   • bumetanide (BUMEX) 0.5 MG tablet Take 1 tablet by mouth Daily As Needed (EDEMA). 30 tablet 3 Unknown at Unknown time   • dilTIAZem CD (CARDIZEM CD) 120 MG 24 hr capsule Take 1 capsule by mouth Daily. 30 capsule 11 Unknown at Unknown time   • estradiol (ESTRACE) 1 MG tablet Take 1 mg by mouth Daily.   Unknown at Unknown time   • HYDROcodone-acetaminophen (NORCO)  MG per tablet Take 2 tablets by mouth 3 (Three) Times a Day.   Unknown at Unknown time   • losartan (COZAAR) 50 MG tablet Take 50 mg by mouth Daily.   Unknown at Unknown time   • metoprolol succinate XL (TOPROL-XL) 100 MG 24 hr tablet Take 50 mg by mouth 2 (Two) Times a Day.   Unknown at Unknown time   • montelukast (SINGULAIR) 10 MG tablet Take 10 mg by mouth Daily.   Unknown at Unknown time   • omeprazole (priLOSEC) 40 MG capsule Take 40 mg by mouth Daily.   Unknown at Unknown time      Current Meds:   Current Facility-Administered Medications   Medication Dose  Route Frequency Provider Last Rate Last Dose   • acetaminophen (TYLENOL) tablet 650 mg  650 mg Oral Q4H PRN Regan Hampton DO        Or   • acetaminophen (TYLENOL) suppository 650 mg  650 mg Rectal Q4H PRN Regan Hampton DO       • budesonide (PULMICORT) nebulizer solution 0.5 mg  0.5 mg Nebulization BID - RT Mary Montanez MD   0.5 mg at 11/15/20 0727   • cefepime (MAXIPIME) 2 g/100 mL 0.9% NS (mbp)  2 g Intravenous Q8H Regan Hamtpon DO 0 mL/hr at 11/13/20 2041 2 g at 11/15/20 0855   • chlorhexidine (PERIDEX) 0.12 % solution 15 mL  15 mL Mouth/Throat Q12H Regan Hampton DO   15 mL at 11/15/20 0855   • dexmedetomidine HCl (PRECEDEX) 400 mcg in sodium chloride 0.9 % 100 mL infusion  0.2-1.5 mcg/kg/hr Intravenous Titrated Regan Hampton DO 6.6 mL/hr at 11/15/20 1126 0.4 mcg/kg/hr at 11/15/20 1126   • famotidine (PEPCID) injection 20 mg  20 mg Intravenous BID Regan Hampton DO   20 mg at 11/15/20 0856   • fentaNYL citrate (PF) (SUBLIMAZE) 1,250 mcg in sodium chloride 0.9 % 250 mL (5 mcg/mL) infusion  0.7 mcg/kg/hr Intravenous Continuous Regan Hampton DO   Stopped at 11/13/20 1400   • ipratropium-albuterol (DUO-NEB) nebulizer solution 3 mL  3 mL Nebulization Q4H - RT Regan Hampton DO   3 mL at 11/15/20 1039   • LORazepam (ATIVAN) injection 1 mg  1 mg Intravenous Q4H PRN Regan Hampton DO   1 mg at 11/15/20 0400   • metoprolol tartrate (LOPRESSOR) tablet 25 mg  25 mg Nasogastric Q12H Rodolfo Menendez MD   25 mg at 11/15/20 0855   • nicotine (NICODERM CQ) 21 MG/24HR patch 1 patch  1 patch Transdermal Q24H Demetri Ramirez APRN   1 patch at 11/15/20 0904   • ondansetron (ZOFRAN) injection 4 mg  4 mg Intravenous Q6H PRN Regan Hampton,        • potassium chloride 10 mEq in 100 mL IVPB  10 mEq Intravenous Q1H PRN Regan Hampton DO       • potassium phosphate 45 mmol in sodium chloride 0.9 % 500 mL infusion  45 mmol Intravenous PRN Regan Hampton,  DO        Or   • potassium phosphate 30 mmol in sodium chloride 0.9 % 250 mL infusion  30 mmol Intravenous PRN Regan Hampotn, DO 31.3 mL/hr at 11/13/20 0540 30 mmol at 11/13/20 0540    Or   • Potassium Phosphates 15 mmol in sodium chloride 0.9 % 100 mL infusion  15 mmol Intravenous PRN Regan Hampton, DO   15 mmol at 11/15/20 0430    Or   • sodium phosphates 45 mmol in sodium chloride 0.9 % 500 mL IVPB  45 mmol Intravenous PRN Regan Hampton, DO        Or   • sodium phosphates 30 mmol in sodium chloride 0.9 % 250 mL IVPB  30 mmol Intravenous PRN Regan Hampton, DO        Or   • sodium phosphates 15 mmol in sodium chloride 0.9 % 250 mL IVPB  15 mmol Intravenous PRN Regan Hampton, DO       • sodium chloride 0.9 % flush 10 mL  10 mL Intravenous PRN Atif Mckoy MD       • sodium chloride 0.9 % flush 10 mL  10 mL Intravenous PRN Atif Mckoy MD       • sodium chloride 0.9 % flush 10 mL  10 mL Intravenous Q12H Regan Hampton, DO   10 mL at 11/15/20 0856   • sodium chloride 0.9 % flush 10 mL  10 mL Intravenous PRN Regan Hampton, DO       • vancomycin 750 mg/250 mL 0.9% NS IVPB ([facility])  750 mg Intravenous Q12H Jovan Juarez, DO   750 mg at 11/15/20 0550       Family History:family history includes Breast cancer in her mother; Cancer in her brother, maternal grandfather, and paternal grandmother; Colon cancer in her mother; Leukemia in her mother; Prostate cancer in her father; Skin cancer in her father.   Social History: reports that she has been smoking cigarettes. She has a 50.00 pack-year smoking history. She has never used smokeless tobacco. She reports current alcohol use. She reports that she does not use drugs.    Review of Systems  Review of Systems   Unable to perform ROS: Intubated (Intubated and sedated)         Objective      Vital Signs   Temp:  [98.4 °F (36.9 °C)-100.5 °F (38.1 °C)] 98.4 °F (36.9 °C)  Heart Rate:  [] 113  Resp:  [17-36] 26  BP:  ()/(44-87) 115/65  FiO2 (%):  [40 %] 40 %    Physical Exam  Constitutional:       Appearance: Normal appearance. She is normal weight.      Interventions: She is sedated and intubated.   HENT:      Head: Normocephalic and atraumatic.      Comments: Some well healed area on the nose     Nose: Nose normal.      Mouth/Throat:      Mouth: Mucous membranes are dry.   Eyes:      Pupils: Pupils are equal, round, and reactive to light.   Neck:      Musculoskeletal: Neck supple.   Cardiovascular:      Rate and Rhythm: Normal rate and regular rhythm.      Pulses: Normal pulses.   Pulmonary:      Effort: Pulmonary effort is normal. She is intubated.      Breath sounds: Normal breath sounds. Decreased air movement present.   Abdominal:      General: Abdomen is flat. Bowel sounds are normal.      Palpations: Abdomen is soft. There is no hepatomegaly or splenomegaly.   Musculoskeletal: Normal range of motion.      Comments: No noted cyanosis of the fingers nor toes   Skin:     General: Skin is warm and dry.   Neurological:      Comments: Cannot assess due to intubation and sedation   Psychiatric:      Comments: Cannot assess - intubated an d sedated          Results from last 7 days   Lab Units 11/15/20  0227 11/14/20  0500 11/13/20  1420   WBC 10*3/mm3 10.80 10.05 10.22   HEMOGLOBIN g/dL 7.7* 7.6* 9.0*   HEMATOCRIT % 24.2* 24.6* 29.8*   PLATELETS 10*3/mm3 54* 54* 68*        Results from last 7 days   Lab Units 11/15/20  0227 11/14/20  0500 11/13/20  1420   SODIUM mmol/L 150* 148* 146*   POTASSIUM mmol/L 3.7 3.8 4.5   CHLORIDE mmol/L 115* 112* 110*   CO2 mmol/L 24.0 24.0 24.0   BUN mg/dL 16 17 21   CREATININE mg/dL 0.54* 0.53* 0.59   CALCIUM mg/dL 9.2 8.8 9.1   BILIRUBIN mg/dL 3.8* 2.5* 2.3*   ALK PHOS U/L 129* 126* 134*   ALT (SGPT) U/L 48* 46* 50*   AST (SGOT) U/L 130* 137* 163*   GLUCOSE mg/dL 124* 112* 117*       ABG:    pH, Arterial   Date Value Ref Range Status   11/15/2020 7.437 7.350 - 7.450 pH units Final     pO2,  Arterial   Date Value Ref Range Status   11/15/2020 61.7 (L) 83.0 - 108.0 mm Hg Final     Comment:     84 Value below reference range     pCO2, Arterial   Date Value Ref Range Status   11/15/2020 34.7 (L) 35.0 - 45.0 mm Hg Final     Comment:     84 Value below reference range       Culture Data:   Blood Culture   Date Value Ref Range Status   11/12/2020 No growth at 2 days  Preliminary   11/12/2020 No growth at 2 days  Preliminary     Respiratory Culture   Date Value Ref Range Status   11/13/2020   Final    Light growth (2+) Normal Respiratory Kristen: NO S.aureus/MRSA or Pseudomonas aeruginosa       Radiology:   Imaging Results (Last 7 Days)     Procedure Component Value Units Date/Time    XR Chest 1 View [[id number]] Collected: 11/15/20 0716     Updated: 11/15/20 0721    Narrative:      Frontal supine radiograph of the chest 11/15/2020 3:45 AM CST     History: Intubated Patient; A41.9-Sepsis, unspecified organism;  R65.20-Severe sepsis without septic shock; J18.9-Pneumonia, unspecified  organism; J96.01-Acute respiratory failure with hypoxia;  M62.82-Rhabdomyolysis; D68.9-Coagulation defect, unspecified     Comparison: 11/14/2020      Findings:      Endotracheal tube in place with tip projecting 2.7 cm above the avery.  Bilateral infiltrates are present, in the left infrahilar and left  basilar areas as well as right upper lobe adjacent to the minor fissure.  Right upper lobe infiltrate is more faint on today's exam. There is a  new mild atelectasis in the right lung base. No pneumothorax or pleural  effusion. Enteric tube courses below the diaphragm with tip beyond the  field of view. Heart size is stable. The pulmonary vasculature are  nondilated. Incidentally noted anterior cervical fusion hardware. No  acute bony abnormality.       Impression:      Impression:   1. There is a new mild atelectasis in the right lung base.  2. Left perihilar and left basilar infiltrates are not significantly  changed. The right  upper lobe infiltrate is more faint on today's exam  and may be improving.  This report was finalized on 11/15/2020 07:18 by Dr Marcellus Joyce, .    XR Chest 1 View [[id number]] Collected: 11/14/20 0848     Updated: 11/14/20 0854    Narrative:      Frontal supine radiograph of the chest 11/14/2020 4:23 AM CST     History: Intubated Patient; A41.9-Sepsis, unspecified organism;  R65.20-Severe sepsis without septic shock; J18.9-Pneumonia, unspecified  organism; J96.01-Acute respiratory failure with hypoxia;  M62.82-Rhabdomyolysis; D68.9-Coagulation defect, unspecified     Comparison: 11/13/2020      Findings:      Endotracheal tube in place with tip projecting 1.9 cm above the avery.  Bilateral infiltrates are present, in the left infrahilar and left  basilar areas as well as right upper lobe adjacent to the minor fissure.  Infiltrates are not significantly changed. Lung volumes are stable. No  pneumothorax or pleural effusion. Enteric tube courses below the  diaphragm with tip beyond the field of view. Heart size is stable. The  pulmonary vasculature are nondilated. Incidentally noted anterior  cervical fusion hardware. No acute bony abnormality.       Impression:      Impression:   1. Endotracheal tube has been withdrawn slightly although the position  remains low. Tube tip projects 1.9 cm above the avery. Additional 1-2  cm withdrawal would be suggested.  2. Faint left basilar, left perihilar and right upper lobe infiltrates  are not significant change.  3. Lung volumes are stable.     This report was finalized on 11/14/2020 08:51 by Dr Marcellus Joyce, .    US Liver [[id number]] Collected: 11/13/20 1544     Updated: 11/13/20 1557    Narrative:      US LIVER-11/13/2020 1:07 PM CST     REASON FOR EXAM: Elevated enzymes; A41.9-Sepsis, unspecified organism;  R65.20-Severe sepsis without septic shock; J18.9-Pneumonia, unspecified  organism; J96.01-Acute respiratory failure with hypoxia;  M62.82-Rhabdomyolysis;  D68.9-Coagulation defect, unspecified       COMPARISON: NONE      TECHNIQUE: Multiple longitudinal and transverse realtime sonographic  images of the right upper quadrant of the abdomen are obtained.      FINDINGS:    Pancreas: Normal in size and echogenicity.      Liver: Normal in size, echogenicity and echotexture. No focal lesion.  There appears to be bidirectional flow within the portal vein without  evidence of portal venous thrombosis. There is increased echogenicity of  the liver parenchyma in comparison the right kidney suggesting mild  steatosis of the liver. No evidence of focal hepatic mass.     Gallbladder: No intraluminal echoes, wall thickening, or pericholecystic  fluid.      Bile ducts: The CBD measures 0.6 cm in diameter. There is no  intrahepatic or extrahepatic ductal dilation.      Right kidney: Normal in size, shape and contour. No mass, hydronephrosis  or calculi. No perinephric fluid collection.       Other: The visualized abdominal aorta is normal in caliber.        Impression:         1. Increased echogenicity of liver parenchyma suggesting steatosis of  the liver. No evidence of focal hepatic mass.  2. Normal appearance of the gallbladder.        This report was finalized on 11/13/2020 15:54 by Dr. Lorenzo Aquino MD.    XR Chest 1 View [[id number]] Collected: 11/13/20 1530     Updated: 11/13/20 1537    Narrative:      EXAMINATION: Chest 1 view 11/13/2020     HISTORY: Hypoxemia     FINDINGS: Today's exam is compared to previous study of earlier same  day. There is persistent left lingular and left lower lobe infiltrate as  well as right upper lobe infiltrate abutting the fissure. These are  stable taking into account some differences in technique. The  endotracheal tube is slightly more advanced with the tip at the avery  pointing towards right mainstem bronchus. An NG tube is been advanced  and is within the stomach.       Impression:      . Left lingular and left lower lobe infiltrate  as well as  right upper limit lobe infiltrate stable from today's earlier exam.  2. Endotracheal tube is gone slightly advanced with the tip at the  avery and pointed towards right mainstem bronchus. I notified the  nursing staff in the CCU of the endotracheal tube position and  recommended partial withdrawal at 3:33 PM.  This report was finalized on 11/13/2020 15:34 by Dr. Lorenzo Aquino MD.    US Venous Doppler Lower Extremity Bilateral (duplex) [[id number]] Collected: 11/13/20 1507     Updated: 11/13/20 1510    Narrative:      History: Swelling       Impression:      Impression: There is no evidence of deep venous thrombosis or  superficial thrombophlebitis of right or left lower extremities.     Comments: Bilateral lower extremity venous duplex exam was performed  using color Doppler flow, Doppler waveform analysis, and grayscale  imaging, with and without compression. There is no evidence of deep  venous thrombosis in the common femoral, superficial femoral, popliteal,  peroneal, anterior tibial, and posterior tibial veins bilaterally. No  thrombus is identified in the saphenofemoral junctions and greater  saphenous veins bilaterally.         This report was finalized on 11/13/2020 15:07 by Dr. Arvind Hoang MD.    XR Chest 1 View [[id number]] Collected: 11/13/20 0714     Updated: 11/13/20 0720    Narrative:      EXAMINATION: XR CHEST 1 VW-  11/13/2020 7:14 AM CST 1 view     HISTORY: Intubated Patient; A41.9-Sepsis, unspecified organism;  R65.20-Severe sepsis without septic shock; J18.9-Pneumonia, unspecified  organism; J96.01-Acute respiratory failure with hypoxia;  M62.82-Rhabdomyolysis; D68.9-Coagulation defect, unspecified     COMPARISON: 11/12/2020.     FINDINGS:   Patchy consolidation in the RIGHT midlung and LEFT lower lung. Mild  perihilar interstitial opacities, decreased since the comparison study.  Mild pulmonary vascular prominence.      No change in the osseous structures. New enteric tube  with tip  projecting over the stomach.          Impression:         1.  New enteric tube as discussed above.     2.  Multifocal consolidative changes in the lungs, similar to the prior  study. Findings suggestive of bilateral pneumonia.        This report was finalized on 11/13/2020 07:17 by Dr. Martinez Hernandez MD.    XR Abdomen KUB [[id number]] Collected: 11/13/20 0713     Updated: 11/13/20 0717    Narrative:      EXAMINATION: XR ABDOMEN KUB-  11/13/2020 7:13 AM CST     HISTORY: OG placement; A41.9-Sepsis, unspecified organism; R65.20-Severe  sepsis without septic shock; J18.9-Pneumonia, unspecified organism;  J96.01-Acute respiratory failure with hypoxia; M62.82-Rhabdomyolysis;  D68.9-Coagulation defect, unspecified      COMPARISON: 11/13/2020     FINDINGS:  Limited exam for evaluation of the enteric tube placement. The tube tip  projects over the body of the stomach. Endotracheal tube tip is  unchanged from the previous chest x-ray.     Evaluation for free intraperitoneal air is limited on a supine  radiograph, but there are no definite findings of free intraperitoneal  air.      The osseous structures demonstrate no acute abnormality.           Impression:         1.  Enteric tube tip projects over the stomach.         This report was finalized on 11/13/2020 07:14 by Dr. Martinez Hernandez MD.    CT Abdomen Pelvis Without Contrast [[id number]] Collected: 11/12/20 1936     Updated: 11/12/20 1941    Narrative:      EXAMINATION: CT ABDOMEN PELVIS WO CONTRAST-      11/12/2020 6:36 PM CST     HISTORY: sepsis, abd pain     In order to have a CT radiation dose as low as reasonably achievable  Automated Exposure Control was utilized for adjustment of the mA and/or  KV according to patient size.     DLP in mGycm= 839.     Noncontrast abdomen pelvis CT.     Normal noncontrast appearance of the liver, gallbladder, pancreas,  spleen, adrenal glands, and kidneys.  No renal stone or hydronephrosis.  Urinary bladder catheter  present.     No bowel dilation.  No peritoneal fluid.     No bowel obstruction and no free air.     Patchy abdominal wall and mesenteric edema noted.     Summary:  1. No mass, abscess, or bowel obstruction.                                   This report was finalized on 11/12/2020 19:38 by Dr. Tay Crisostomo MD.    CT Chest Without Contrast [[id number]] Collected: 11/12/20 1935     Updated: 11/12/20 1939    Narrative:      EXAMINATION: CT CHEST WO CONTRAST-      11/12/2020 6:36 PM CST     HISTORY: Respiratory failure. Fall injury.     In order to have a CT radiation dose as low as reasonably achievable  Automated Exposure Control was utilized for adjustment of the mA and/or  KV according to patient size.     DLP in mGycm= 314.     Heart size is at the upper limits of normal.  Aortic calcification with no aneurysm.     Low-lying endotracheal tube with the tip 12 mm above the avery.     Granulomatous lymph node calcification within the middle mediastinum and  at the right hilum.     Bilateral infiltrate for which pneumonia is favored over pulmonary  edema.  No pneumothorax and no significant pleural effusion.     Summary:  1. Bilateral pneumonia.  2. Somewhat low-lying endotracheal tube with the tip 12 mm above the  avery.                                   This report was finalized on 11/12/2020 19:36 by Dr. Tay Crisostomo MD.    CT Maxillofacial Without Contrast [[id number]] Collected: 11/12/20 1934     Updated: 11/12/20 1938    Narrative:      EXAMINATION: CT MAXILLOFACIAL WO CONT-      11/12/2020 6:36 PM CST     HISTORY: Fall injury. Head and face trauma.     In order to have a CT radiation dose as low as reasonably achievable  Automated Exposure Control was utilized for adjustment of the mA and/or  KV according to patient size.     DLP in mGycm= 158.     Endotracheal tube present.     Intact mandible and maxilla.  Intact nasal bones and zygomatic arches.     Bony orbits are intact.     Paranasal sinuses are  essentially clear.     Summary:  1. No facial bone fracture.                                   This report was finalized on 11/12/2020 19:34 by Dr. Tay Crisostomo MD.    CT Cervical Spine Without Contrast [[id number]] Collected: 11/12/20 1932     Updated: 11/12/20 1937    Narrative:      EXAMINATION: CT CERVICAL SPINE WO CONTRAST-      11/12/2020 6:53 PM CST     HISTORY: Fall injury. Head and neck trauma.     In order to have a CT radiation dose as low as reasonably achievable  Automated Exposure Control was utilized for adjustment of the mA and/or  KV according to patient size.     DLP in mGycm= 374.     Axial, sagittal, and coronal noncontrast CT imaging.     Endotracheal tube present.     Vertebral bodies and posterior elements are intact.     Reconstructed sagittal images show normal curvature.   There is no malalignment. Prevertebral soft tissues are normal.   Facet joints align normally.     Reconstructed coronal images show normal lateral mass alignment.     C4-5, C5-6, and C6-7 discectomy with anterior plate and screw fusion.  Uncinate spurring with mild foraminal narrowing at C5-6 and C6-7.       Impression:      1. No acute fracture.     .                                   This report was finalized on 11/12/2020 19:33 by Dr. Tay Crisostomo MD.    CT Head Without Contrast [[id number]] Collected: 11/12/20 1923     Updated: 11/12/20 1927    Narrative:      EXAMINATION: CT HEAD WO CONTRAST-      11/12/2020 6:36 PM CST     HISTORY: Mental status change, unknown cause     In order to have a CT radiation dose as low as reasonably achievable  Automated Exposure Control was utilized for adjustment of the mA and/or  KV according to patient size.     DLP in mGycm= 640.     Noncontrast head CT compared with a brain MRI exam from 6/10/2020.     Axial, sagittal, and coronal noncontrast CT imaging of the head.     The visualized paranasal sinuses are clear.     The brain and ventricles have an age appropriate appearance.    There is no hemorrhage or mass-effect.   No acute infarction is seen.     No calvarial abnormality.       Impression:      1. No acute intracranial abnormality is seen.                                         This report was finalized on 11/12/2020 19:24 by Dr. Tay Crisostomo MD.    XR Knee 1 or 2 View Right [[id number]] Collected: 11/12/20 1703     Updated: 11/12/20 1706    Narrative:      EXAMINATION: XR KNEE 1 OR 2 VW RIGHT-     11/12/2020 4:58 PM CST     HISTORY: Right knee pain and swelling.     Right knee, 2 views.     Extensive chondrocalcinosis of the tibiofemoral compartments.     Intact distal femur and proximal tibia and fibula.  Intact patella.     No significant joint effusion.     Summary:  1. Osteoarthritic joint changes.  2. No acute bony abnormality.     This report was finalized on 11/12/2020 17:03 by Dr. Tay Crisostomo MD.    XR Chest 1 View [[id number]] Collected: 11/12/20 1701     Updated: 11/12/20 1705    Narrative:      EXAMINATION: XR CHEST 1 VW-     11/12/2020 4:49 PM CST     HISTORY: Simple Sepsis Protocol     1 view chest x-ray compared with 9/25/2020.     Endotracheal tube is present. The tip lies 18 mm above the avery.     Magnified heart size.     Interstitial lung disease with patchy bilateral infiltrate most  prominent in the left perihilar region.     Summary:  1. Endotracheal tube present. The tip lies 18 mm above the avery.  2. Patchy bilateral pneumonia.  This report was finalized on 11/12/2020 17:02 by Dr. Tay Crisostomo MD.          Pathology Results:   Lab Results   Component Value Date    PATHINTERP  11/13/2020     Complete blood count and peripheral smear, review:  Normocytic anemia with target cells.  Rare basophilic stippling.  Thrombocytopenia.  Negative for schistocytes.    Normocytic anemia with increased corrected reticulocyte count can be seen with red cell loss as in hemorrhage, hemolysis or DIC.  Normocytic anemia without an elevated reticulocyte count suggest an  element of ineffective erythropoiesis which may be secondary to one of many causes, including but not limited to: Anemia of chronic disease, chronic inflammatory disorders, various endocrine disorders, malnutrition and medications.  Target cells can be seen in liver disease, post splenectomy, thalassemia, iron deficiency and hemoglobin C.  Basophilic stippling can be seen in lead/heavy metal poisoning, sideroblastic anemias and thalassemias.  In general, thrombocytopenia can be categorized into causes due to: (1) decreased platelet production seen with bone marrow replacement by malignancy or with certain drugs (including thiazides, alcohol, estrogens); (2) increased platelet destruction; (3) increased sequestration (as with hypersplenism); (4) with platelet loss (as with hemorrhage or multiple blood transfusions).        Results Review:          I reviewed the patient's new clinical results.       Assessment/Plan        Assessment/Plans:   Date  9/25/2019  10/1/2019  2/25/2020  5/22/2020:  7/28/2020  9/25/2020  11/12/2020  11/13/20  11/15/2020   WBC  3.8  5.14  5.6  4.9  4.62  7.41  8.22  8.31  10.80   Hb  8.3  7.5  11.4  11.6  8.8  10.5  9.6  8.7  7.7   MCV  77.7  78.3  89.6  86.3  85.2  83.1  81.8  80.6  88.7   Plt  142  144  206  199  132  218  73  64  54   ANC  2.5   4.1  3.2  2.96  4.91  7.02  7.81    ALC  1.1   1.1  1.3  1.05  1.73  0.35  0.08    nRBC      0.4 0   2.0    Ferritin  16.6         216.3   Iron  22 (L)         44   Iron SAT          8%   Transferrin          353   TIBC  594 (H)         526   Hapto            LDH            Keyshawn            Rj            Zinc            Retic            B12  520           Folate  8.4           Hep panel            HIV            Creatinine  0.66  0.61  0.9  0.6  0.63  0.86   0.42  0.54   Glucose  101  124  127  124  110  141   97  124   AST  20  26  36  23  28  29   158  130   ALT  12  19  29  11  10  11   46  48   Alk phos  49  83  81  78  90  123   119  129    Total bilirubin         2.4  3.8   CRP        3.52     Fibrinogen        305   422   D-dimer        3.12     Lipase        156     Influenza        negative     Covid        negative     Drug screen        opiate positive     Antithrombin III        41 (L)     Fibrin split products       >20  >20   Myoglobulin         positive    LDH         23.5  800   PT         45    PTT                                      **Anemia and thrombocytopenia in an acutely ill patient   -Anemia appears to be chronic over a long period of time but is gotten worse especially during her admission which is likely related to acute illness at this time  -Platelets have also been downtrending but have decreased significantly.  -At the time of her admission, the patient's platelet count were already low have not significantly decreased and likely reflect a DIC/sepsis picture.  - Although HIT cannot be ruled out as the patient is receiving low molecular weight heparin for DVT prophylaxis, it appears that her thrombocytopenia was present prior to receiving any heparin products  -Fibrinogen degradation split products as well as low Antithrombin III may be indicative of DIC and severe infection as the patient is being treated for a bilateral pneumonia  -As far as the anemia and thrombocytopenia, would add the following tests:  o reticulocyte counts  o folate  o B12 level WNL  o Haptoglobin  o LDH elevated at 800  o Peripheral smear  o Coomb's (direct and indirect)  o Ferritin elevated likely as a acute phase reactant and should be rechecked if the patient recovers from the acute event  o Iron profile showing a low iron saturation and with her low MCV there may be a reflection of iron deficiency anemia and should be checked again after the patient recovers from her acute illness  o Copper  o Zinc  o Liver and spleen ultrasound to rule out hepatosplenomegaly  o HIV  o Hepatitis panels    **Noted to have a low Antithrombin III  -Low Antithrombin  III levels can be temporarily associated with other conditions other than Antithrombin III deficiency such as heparin therapy, DIC due to severe infections, severe trauma, severe burns or the presence of acute blood clots.  - This level should be rechecked after the patient recovers      ** Infection status:   · Influenza and Covid negative, blood cultures showing no growth urinalysis only showing trace ketones and moderate blood  -S pneumo and Legionella both negative  Patient being currently treated for bilateral pneumonia-   **Metabolic / Renal:   · Myoglobulin positive indicative of likely rhabdomyolysis  ** Endocrine:   · No current issues   ** Coagulation / VTE prophylaxis:   -Decrease Antithrombin III along with high fibrinogen degradation products may all be signs of DIC  -No evidence of DVTs on ultrasound of the lower extremities on 11.13.20  ** GI:   -Elevation in LFTs may be a reflection of severe hypotension affecting the liver as well as sepsis and should be checked over time  - unclear how much alcohol patient consumes but as per family she is alcohol dependent, as well as dependent on benzodiazepines and opiates  - The elevation in PT/PTT may again be a reflection of severe DIC and sepsis-but may also be evidence of a possibly failing liver and should be monitored and trended  -Ultrasound of the liver dated 11/12/2020: increased echogenicity of the liver parenchyma suggesting steatosis of the liver without evidence of focal hepatic mass, no description of the spleen were given on this exam.  CT and pelvis  -CT abdomen pelvis on 11/12/2020 shows normal noncontrast appearance of the liver, gallbladder, pancreas, spleen, adrenal glands, kidneys  -GERD to be followed by hospitalist/MICU team  ** Pulmonary:   · Current smoking status current smoker which of course would be discouraged if the patient recovers from this episode  -COPD on home oxygen therapy being treated by pulmonary/hospitalist  **  Cardiology:   -Hypertension being followed by ICU/hospitalist  ** Skin / Rash:   · Biopsy from 5/21/2020 of the nose tip:1.  Skin, nasal dorsum lesion, punch biopsy: A.  Actinic elastosis of dermal collagen.B.  No histologic evidence of malignancy.2.  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  Described as infiltrative into the skeletal muscle and is seen on the peripheral margins of the biopsy without perineural invasion  -This will require Derm follow-up if not already done once the patient's acute issues have improved  -Positive margins of squamous cell carcinoma either need to be reresected or treatment with radiation plus minus systemic therapy and this will require outpatient evaluation both by radiation oncology as well as oncology.  If the patient recovers from her current acute condition, will need to follow-up either with Dr. Barbour or Dr. Block for possible need for systemic therapy  ** :   · Patient has been found to have urine blood which may be partially contributing to her anemia  ** Rheumatology  -History of arthritis may be contributing to the baseline anemia as part of anemia of chronic disease  -Check BETZY, rheumatoid factor  -CRP is elevated   ** Neurologic:   · No current issues   ** Psychosocial:   · No current issues as patient is intubated and sedated but patient has a history of anxiety  ** Pain control:   -Chronic back pain with questionable history of substance abuse, opiate positive on admission  - as per RN, family reports ALCOHOL DEPENDENCE as well as DEPENDENCE OF BENZODIAZEPINES AND OPIATES      Thank you for allowing me to participate in the care of this patient.  Ami Goldberg, MD  Hematology/oncology

## 2020-11-15 NOTE — PROGRESS NOTES
[facility] Medicine Services  INPATIENT PROGRESS NOTE    Patient Name: Muriel Hernandez  Date of Admission: 11/12/2020  Today's Date: 11/15/20  Length of Stay: 3  Primary Care Physician: Real Alicea MD    Subjective   Chief Complaint: Respiratory failure  HPI   No major events overnight.  She remains sedated at present.  Discussed at bedside with her nurse, Robin.  Pulmonology has not evaluated yet this morning.    A second COVID-19 swab yesterday was also negative.    Review of Systems   Unable to assess secondary to the patient's intubated and sedated state.     Objective    Temp:  [98.4 °F (36.9 °C)-100.5 °F (38.1 °C)] 98.4 °F (36.9 °C)  Heart Rate:  [] 101  Resp:  [17-36] 18  BP: ()/(44-87) 118/64  FiO2 (%):  [40 %] 40 %  Physical Exam  Constitutional:       Appearance: She is well-developed.      Comments: Chronically ill-appearing 60-year-old  female patient who is currently ventilated and sedated.   HENT:      Head: Normocephalic and atraumatic.   Eyes:      Conjunctiva/sclera: Conjunctivae normal.   Neck:      Musculoskeletal: Neck supple.      Vascular: No JVD.   Cardiovascular:      Rate and Rhythm: Normal rate and regular rhythm.      Heart sounds: Normal heart sounds. No murmur. No friction rub. No gallop.    Pulmonary:      Effort: Pulmonary effort is normal. No respiratory distress.      Breath sounds: Rhonchi present. No wheezing or rales.   Chest:      Chest wall: No tenderness.   Abdominal:      General: Bowel sounds are normal. There is no distension.      Palpations: Abdomen is soft.      Tenderness: There is no abdominal tenderness. There is no guarding or rebound.   Musculoskeletal: Normal range of motion.         General: No tenderness or deformity.   Skin:     General: Skin is warm and dry.      Findings: No rash.   Neurological:      Motor: No abnormal muscle tone.      Deep Tendon Reflexes: Reflexes normal.                  Intake/Output Summary (Last 24 hours) at 11/15/2020 0741  Last data filed at 11/14/2020 1953  Gross per 24 hour   Intake 1267 ml   Output 700 ml   Net 567 ml     Results Review:  I have reviewed the labs, radiology results, and diagnostic studies.    Laboratory Data:   Results from last 7 days   Lab Units 11/15/20  0227 11/14/20  0500 11/13/20  1420   WBC 10*3/mm3 10.80 10.05 10.22   HEMOGLOBIN g/dL 7.7* 7.6* 9.0*   HEMATOCRIT % 24.2* 24.6* 29.8*   PLATELETS 10*3/mm3 54* 54* 68*     Results from last 7 days   Lab Units 11/15/20  0227 11/14/20  0500 11/13/20  1420   SODIUM mmol/L 150* 148* 146*   POTASSIUM mmol/L 3.7 3.8 4.5   CHLORIDE mmol/L 115* 112* 110*   CO2 mmol/L 24.0 24.0 24.0   BUN mg/dL 16 17 21   CREATININE mg/dL 0.54* 0.53* 0.59   CALCIUM mg/dL 9.2 8.8 9.1   BILIRUBIN mg/dL 3.8* 2.5* 2.3*   ALK PHOS U/L 129* 126* 134*   ALT (SGPT) U/L 48* 46* 50*   AST (SGOT) U/L 130* 137* 163*   GLUCOSE mg/dL 124* 112* 117*     Results from last 7 days   Lab Units 11/15/20  0227 11/13/20  0340 11/12/20  1611   MAGNESIUM mg/dL 1.6  --  2.0   PHOSPHORUS mg/dL 2.4* 1.5*  --      Culture Data:   Blood Culture   Date Value Ref Range Status   11/12/2020 No growth at 2 days  Preliminary   11/12/2020 No growth at 2 days  Preliminary     Respiratory Culture   Date Value Ref Range Status   11/13/2020 Culture in progress  Preliminary     Radiology Data:   Imaging Results (Last 24 Hours)     Procedure Component Value Units Date/Time    XR Chest 1 View [[id number]] Collected: 11/15/20 0716     Updated: 11/15/20 0721    Narrative:      Frontal supine radiograph of the chest 11/15/2020 3:45 AM CST     History: Intubated Patient; A41.9-Sepsis, unspecified organism;  R65.20-Severe sepsis without septic shock; J18.9-Pneumonia, unspecified  organism; J96.01-Acute respiratory failure with hypoxia;  M62.82-Rhabdomyolysis; D68.9-Coagulation defect, unspecified     Comparison: 11/14/2020      Findings:      Endotracheal tube in place  with tip projecting 2.7 cm above the aevry.  Bilateral infiltrates are present, in the left infrahilar and left  basilar areas as well as right upper lobe adjacent to the minor fissure.  Right upper lobe infiltrate is more faint on today's exam. There is a  new mild atelectasis in the right lung base. No pneumothorax or pleural  effusion. Enteric tube courses below the diaphragm with tip beyond the  field of view. Heart size is stable. The pulmonary vasculature are  nondilated. Incidentally noted anterior cervical fusion hardware. No  acute bony abnormality.       Impression:      Impression:   1. There is a new mild atelectasis in the right lung base.  2. Left perihilar and left basilar infiltrates are not significantly  changed. The right upper lobe infiltrate is more faint on today's exam  and may be improving.  This report was finalized on 11/15/2020 07:18 by Dr Marcellus Joyce, .    XR Chest 1 View [[id number]] Collected: 11/14/20 0848     Updated: 11/14/20 0854    Narrative:      Frontal supine radiograph of the chest 11/14/2020 4:23 AM CST     History: Intubated Patient; A41.9-Sepsis, unspecified organism;  R65.20-Severe sepsis without septic shock; J18.9-Pneumonia, unspecified  organism; J96.01-Acute respiratory failure with hypoxia;  M62.82-Rhabdomyolysis; D68.9-Coagulation defect, unspecified     Comparison: 11/13/2020      Findings:      Endotracheal tube in place with tip projecting 1.9 cm above the avery.  Bilateral infiltrates are present, in the left infrahilar and left  basilar areas as well as right upper lobe adjacent to the minor fissure.  Infiltrates are not significantly changed. Lung volumes are stable. No  pneumothorax or pleural effusion. Enteric tube courses below the  diaphragm with tip beyond the field of view. Heart size is stable. The  pulmonary vasculature are nondilated. Incidentally noted anterior  cervical fusion hardware. No acute bony abnormality.       Impression:      Impression:    1. Endotracheal tube has been withdrawn slightly although the position  remains low. Tube tip projects 1.9 cm above the avery. Additional 1-2  cm withdrawal would be suggested.  2. Faint left basilar, left perihilar and right upper lobe infiltrates  are not significant change.  3. Lung volumes are stable.     This report was finalized on 11/14/2020 08:51 by Dr Marcellus Joyce, .        Results from last 7 days   Lab Units 11/15/20  0023   PH, ARTERIAL pH units 7.437   PO2 ART mm Hg 61.7*   PCO2, ARTERIAL mm Hg 34.7*   HCO3 ART mmol/L 23.4   FiO2 (%):  [40 %] 40 %  S RR:  [18] 18  PEEP/CPAP (cm H2O):  [5 cm H20] 5 cm H20  RI SUP:  [0 cm H20] 0 cm H20  MAP (cm H2O):  [11-21] 21    I have reviewed the patient's current medications.     Assessment/Plan     Active Hospital Problems    Diagnosis   • **Bilateral pneumonia   • Severe sepsis (CMS/HCC)   • Lactic acidosis   • Acute respiratory failure with hypoxia (CMS/HCC)   • Transaminitis   • Thrombocytopenia (CMS/HCC)   • Rhabdomyolysis   • Hypothermia   • Seizures (CMS/HCC)   • Current every day smoker   • COPD (chronic obstructive pulmonary disease) (CMS/HCC)   • Chronic pain   • Normocytic anemia     Plan:  The patient was admitted on 11/12 Dr. Hampton.  She presented after an unresponsive episode.  She was found at home by her family.  She was hypothermic.  She had a work-up and imaging in the emergency department that found her to have bilateral pneumonia.  She has required mechanical ventilation since presentation.     Pulmonary continues to follow. Continue sedation with Precedex and fentanyl.  Awaiting for her to be appropriate for spontaneous breathing trials.     COVID-19 testing negative x 2 ([place], Cepheid).  Expanded respiratory PCR panel negative. Influenza negative.  Blood cultures with no growth to date.  Streptococcal and Legionella urinary antigens negative. No MRSA detected on nasal screen. Sputum culture with budding yeast.  Continue vancomycin and  cefepime for now.  Consider de-escalation of vancomycin soon given negative MRSA nasal screen.     She very likely has had some mild rhabdomyolysis given her presentation.  CPK downtrending. Troponin was slightly elevated and the trend was flat. Transaminases still elevated with a rising bilirubin.  CT of the abdomen pelvis showed a normal appearance of the liver, gallbladder, and pancreas. Ultrasound of the liver showed steatosis.     She remains thrombocytopenic.  She does not appear to have chronic thrombocytopenia.  Continue to follow. This is likely secondary to sepsis.  Her hemoglobin is stable, but low.  Check substrates.  Check coagulation parameters, DIC work-up, peripheral smear, and LDH/haptoglobin as well.  Hematology consult.     Nicotine patch applied.     Pepcid for peptic ulcer prophylaxis.     SCDs for DVT prophylaxis.    Electronically signed by Jovan Juarez DO, 11/15/20, 07:41 CST.

## 2020-11-16 NOTE — PROGRESS NOTES
PROGRESS NOTE  Patient Name: Muriel Hernandez  Age/Sex: 60 y.o. female  : 1960  MRN: [id number]    Date of Admission: 2020  Date of Encounter Visit: 20   LOS: 4 days   Patient Care Team:  Real Alicea MD as PCP - General (Family Medicine)  Curtis Lynch III, MD as Consulting Physician (Radiation Oncology)  Julio Cesar Nguyen MD as Cardiologist (Cardiology)  Jovan Trejo MD as Referring Physician (Otolaryngology)    Chief Complaint: Intubated on the ventilator, responsive but does not follow commands    Hospital course: Patient came in with altered mental status, possible COPD,Has underlying severe pulmonary hypertension, did have some thrombocytopenia with possible alcoholism, especially with inverted AST/ALT ratio.  Currently intubated on the ventilator, does not seem ready for spontaneous breathing trial yet.  Had fever with sepsis,  was on vancomycin that was discontinued, she had 3 days of cefepime that  yesterday.  Her chest x-ray and CAT scan showed scattered pulmonary infiltrate.  Patient was started on sedation yesterday, she seems to be more responsive today even though she is not able to follow complex commands but she does open eyes to stimulation and still easily grimace to noxious stimulation and withdrawing all extremities.  Ventilator settings were adjusted on today's assessment.          REVIEW OF SYSTEMS:   CONSTITUTIONAL: Afebrile  Limited system review while on the ventilator  Ventilator/Non-Invasive Ventilation Settings (From admission, onward)     Start     Ordered    20 1706  Ventilator - AC/VC; 90%; 5  Continuous     Question Answer Comment   Vent Mode AC/VC    FiO2 titrate for Sp02% =/> 90%    PEEP 5        20 1705                  Vital Signs  Temp:  [96.5 °F (35.8 °C)-98.1 °F (36.7 °C)] 97.2 °F (36.2 °C)  Heart Rate:  [] 78  Resp:  [26-44] 32  BP: ()/(32-86) 91/49  FiO2 (%):  [50 %-60 %] 60 %  SpO2:  [83 %-100 %] 99 %  on     "Device (Oxygen Therapy): ventilator    Intake/Output Summary (Last 24 hours) at 11/16/2020 0902  Last data filed at 11/16/2020 0421  Gross per 24 hour   Intake 534.33 ml   Output 655 ml   Net -120.67 ml     Flowsheet Rows      First Filed Value   Admission Height  152.4 cm (60\") Documented at 11/12/2020 1622   Admission Weight  64.4 kg (142 lb) Documented at 11/12/2020 1549        Body mass index is 27.98 kg/m².      11/14/20  0458 11/15/20  0331 11/16/20  0400   Weight: 65.5 kg (144 lb 8 oz) 63.3 kg (139 lb 9.6 oz) 65 kg (143 lb 4 oz)       Physical Exam:  GEN:  Orally intubated, on the ventilator, arousable, did not follow command but was able to open eyes to stimulation and still withdrawing all extremities, seems to be more comfortable today with a level of sedation  EYES:   Sclerae clear. No icterus. PERRL. Normal EOM  ENT:   External ears/nose normal, no oral lesions, no thrush, mucous membranes moist, oral ET tube  NECK:  Supple, midline trachea, no JVD  LUNGS: Normal chest on inspection, crackles heard bilaterally both anteriorly and posteriorly. Respirations regular, breathing in sync with the ventilator but still slightly tachypneic, reduced lung compliance  CV:  Regular rhythm and rate. Normal S1/S2. No murmurs, gallops, or rubs noted.  ABD:  Soft, nontender and nondistended. Normal bowel sounds. No guarding  EXT:  Moves all extremities well. No cyanosis. No redness.  Minimal positive pitting edema mainly over the hip area.   Skin: Dry, intact, no bleeding    Results Review:    Results From Last 14 Days   Lab Units 11/15/20  1037 11/15/20  0227 11/13/20  1420 11/13/20  0340 11/12/20 2003 11/12/20  1611   CRP mg/dL  --   --   --   --   --  3.52*   D DIMER QUANT mg/L (FEU)  --   --   --   --   --  3.12*   FERRITIN ng/mL  --  216.30*  --   --   --   --    LACTATE mmol/L  --   --  2.9* 2.1* 2.7* 5.9*   LDH U/L 800*  --   --   --   --   --    TRIGLYCERIDES mg/dL  --   --   --  80  --   --      Results from " last 7 days   Lab Units 11/16/20  0305 11/15/20  0227 11/14/20  0500 11/13/20  1420 11/13/20  0340 11/12/20  1611   SODIUM mmol/L 155* 150* 148* 146* 145 139   POTASSIUM mmol/L 3.5 3.7 3.8 4.5 3.8 4.2   CHLORIDE mmol/L 121* 115* 112* 110* 109* 100   CO2 mmol/L 24.0 24.0 24.0 24.0 26.0 25.0   BUN mg/dL 24* 16 17 21 22 29*   CREATININE mg/dL 0.57 0.54* 0.53* 0.59 0.42* 0.50*   CALCIUM mg/dL 9.0 9.2 8.8 9.1 9.6 10.5   AST (SGOT) U/L 96* 130* 137* 163* 158* 169*   ALT (SGPT) U/L 44* 48* 46* 50* 46* 45*   ANION GAP mmol/L 10.0 11.0 12.0 12.0 10.0 14.0   ALBUMIN g/dL 3.30* 3.60 3.40* 3.90 3.70 4.40     Results from last 7 days   Lab Units 11/14/20  0500 11/13/20  1420 11/13/20  0340 11/13/20  0049   CK TOTAL U/L 660* 736* 996*  --    TROPONIN T ng/mL  --  0.062* 0.064* 0.075*         Results from last 7 days   Lab Units 11/13/20  1420   PROBNP pg/mL 29,428.0*     Results from last 7 days   Lab Units 11/16/20  0305 11/15/20  0227 11/14/20  0500 11/13/20  1420 11/13/20  0340 11/12/20  1611   WBC 10*3/mm3 10.46 10.80 10.05 10.22 8.31 8.22   HEMOGLOBIN g/dL 7.8* 7.7* 7.6* 9.0* 8.7* 9.6*   HEMATOCRIT % 25.1* 24.2* 24.6* 29.8* 27.8* 31.0*   PLATELETS 10*3/mm3 44* 54* 54* 68* 64* 73*   MCV fL 81.2 80.7 81.5 83.2 80.6 81.8   NEUTROPHIL % %  --   --   --   --   --  85.4*   LYMPHOCYTE % %  --   --   --   --   --  4.3*   MONOCYTES % %  --   --   --   --   --  9.0   EOSINOPHIL % %  --   --   --   --   --  0.1*   BASOPHIL % %  --   --   --   --   --  0.1     Results from last 7 days   Lab Units 11/15/20  1037 11/12/20  1611   INR  2.11* 2.78*   APTT seconds 45.0* 48.4*     Results from last 7 days   Lab Units 11/15/20  0227 11/12/20  1611   MAGNESIUM mg/dL 1.6 2.0     Results from last 7 days   Lab Units 11/13/20  0340   CHOLESTEROL mg/dL 69   TRIGLYCERIDES mg/dL 80   HDL CHOL mg/dL 21*     Results from last 7 days   Lab Units 11/16/20  0055 11/15/20  0023 11/14/20  0035 11/13/20  1408 11/13/20  0405 11/12/20  2030 11/12/20  1550      PH, ARTERIAL pH units 7.393 7.437 7.408 7.298* 7.424 7.309* 7.301*   PCO2, ARTERIAL mm Hg 37.3 34.7* 38.2 48.3* 38.5 47.9* 47.5*   PO2 ART mm Hg 60.5* 61.7* 115.0* 363.0* 63.4* 83.1 56.0*   HCO3 ART mmol/L 22.7 23.4 24.1 23.6 25.2 24.0 23.4         Glucose   Date/Time Value Ref Range Status   11/15/2020 2104 123 70 - 130 mg/dL Final     Comment:     : [id number] Glen Pollard ID: [id number]     Results from last 7 days   Lab Units 11/13/20  1420 11/13/20  0340 11/12/20  2003 11/12/20  1611   LACTATE mmol/L 2.9* 2.1* 2.7* 5.9*     Results from last 7 days   Lab Units 11/13/20  2154 11/13/20  0933 11/12/20  1720 11/12/20  1611   BLOODCX   --   --  No growth at 3 days No growth at 3 days   RESPCX  Light growth (2+) Normal Respiratory Kristen: NO S.aureus/MRSA or Pseudomonas aeruginosa  --   --   --    STREP PNEUMO AG   --  Negative  --   --    L. PNEUMOPHILA SEROGP 1 UR AG   --  Negative  --   --      Results from last 7 days   Lab Units 11/12/20  1720   NITRITE UA  Negative   WBC UA /HPF None Seen   BACTERIA UA /HPF None Seen   SQUAM EPITHEL UA /HPF None Seen     Results from last 7 days   Lab Units 11/13/20  0746   ADENOVIRUS DETECTION BY PCR  Not Detected   CORONAVIRUS 229E  Not Detected   CORONAVIRUS HKU1  Not Detected   CORONAVIRUS NL63  Not Detected   CORONAVIRUS OC43  Not Detected   HUMAN METAPNEUMOVIRUS  Not Detected   HUMAN RHINOVIRUS/ENTEROVIRUS  Not Detected   INFLUENZA B PCR  Not Detected   PARAINFLUENZA 1  Not Detected   PARAINFLUENZA VIRUS 2  Not Detected   PARAINFLUENZA VIRUS 3  Not Detected   PARAINFLUENZA VIRUS 4  Not Detected   BORDETELLA PERTUSSIS PCR  Not Detected   BORDETELLA PARAPERTUSSIS PCR  Not Detected   [id number]  Not Detected   CHLAMYDOPHILA PNEUMONIAE PCR  Not Detected   MYCOPLAMA PNEUMO PCR  Not Detected   INFLUENZA A H3  Not Detected   INFLUENZA A H1  Not Detected   RSV, PCR  Not Detected     Results from last 7 days   Lab Units 11/13/20  7204   URINE MYOGLOBIN, QUALITATIVE   Positive*           Echocardiogram 11/13/2020:  · Estimated left ventricular EF = 55% Left ventricular systolic function is normal.  · Severe pulmonary hypertension is present.  · Estimated right ventricular systolic pressure from tricuspid regurgitation is markedly elevated (>55 mmHg).  57 mmHg  · Moderate tricuspid valve regurgitation is present.  · The right ventricular cavity is mild to moderately dilated with mildly reduced systolic function.  · The right atrial cavity is dilated.  · No significant change from prior exam 7/27/2020.  Imaging:   Imaging Results (All)     Procedure Component Value Units Date/Time             I reviewed the patient's new clinical results.  I personally viewed and interpreted the patient's imaging results:        Medication Review:   budesonide, 0.5 mg, Nebulization, BID - RT  chlorhexidine, 15 mL, Mouth/Throat, Q12H  famotidine, 20 mg, Intravenous, BID  ipratropium-albuterol, 3 mL, Nebulization, Q4H - RT  LORazepam, 0.5 mg, Intravenous, Q8H    Followed by  LORazepam, 0.5 mg, Intravenous, Q6H  metoprolol tartrate, 25 mg, Nasogastric, Q12H  nicotine, 1 patch, Transdermal, Q24H  sodium chloride, 10 mL, Intravenous, Q12H        dexmedetomidine, 0.1-1.5 mcg/kg/hr, Last Rate: 0.2 mcg/kg/hr (11/16/20 6228)  fentanyl, 0.7 mcg/kg/hr, Last Rate: 0.7 mcg/kg/hr (11/16/20 1796)        ASSESSMENT:   1. Acute hypoxemic respiratory failure  2. Thrombocytopenia  3. Tobacco abuse  4. Probable COPD  5. Anemia  6. Rhabdomyolysis  7. Hypothermia  8. Severe pulmonary hypertension  9. Worsening hyponatremia    PLAN:  Sputum culture were negative for any Pseudomonas or MRSA, patient finished 3 days of cefepime, will give Rocephin for 3 more days  Patient is okay to be monitored without the vancomycin  Ventilator settings were adjusted, not ready for weaning trial yet  Given the severe pulmonary hypertension and the elevated proBNP and now that her blood pressure is doing okay patient need to be started on  diuretics while closely follow-up on her renal function.  Continue with the bronchodilator and pulmonary hygiene  We will add steroids for her COPD  Thrombocytopenia is to be further  managed by hematology, likely alcohol-related  No active bleeding suspected    Discussed with respiratory therapist    Keyon Wallis MD  11/16/20  09:02 CST           Dictated utilizing Dragon dictation

## 2020-11-16 NOTE — PROGRESS NOTES
[facility] Oncology/Hematology Services  CONSULT NOTE    PATIENT NAME:  Muriel Hernandez  YOB: 1960  PATIENT MRN:  [id number]    Date of Admission:  11/12/2020  Follow up Date:  11/16/2020  Referring Provider: No ref. provider found    Subjective         History of present illness: Muriel Hernandez 60 y.o. female who was found on the floor of her home covered in feces on the day of admission, had agonal breathing and was hypothermic.  The patient has a history of COPD, polysubstance abuse, chronic hyponatremia, TIA, skin cancer and questionable alcoholism.  She was brought to the emergency room and subsequently intubated and was treated with cefepime and vancomycin in the ER.  Since her admission, the patient has been treated for bilateral pneumonia and remains on mechanical ventilation.  She was noted to have thrombocytopenia and anemia and therefore hematology called.  Upon review of the patient's previous labs from 2019, her anemia is not new and platelets were in the low normal range.     As per RN, she lives alone but family stating that she is alcohol dependent, in addition to benzo and opiate dependence    On follow up on 11.16.20: Unchanged from initial exam, remains intubated.     Past Medical History:  Past Medical History:   Diagnosis Date   • Anxiety    • Arthritis    • Asthma    • Cancer (CMS/HCC)     skin   • Chronic back pain    • COPD (chronic obstructive pulmonary disease) (CMS/HCC)    • GERD (gastroesophageal reflux disease)    • Glaucoma    • Hypertension    • IBS (irritable bowel syndrome)    • Neoplasm of uncertain behavior     nasal tip   • On home oxygen therapy    • Skin cancer    • TIA (transient ischemic attack)    • UTI (urinary tract infection)      Prior Surgeries:  Past Surgical History:   Procedure Laterality Date   • BREAST SURGERY Left 1982    NO LYMPH NODES REMOVED   • CERVICAL FUSION     • COLONOSCOPY     • FLAP HEAD/NECK N/A 9/15/2020     Procedure: flap inset;  Surgeon: Jovan Trejo MD;  Location:  [facility] OR;  Service: ENT;  Laterality: N/A;   • HEAD/NECK LESION/CYST EXCISION Bilateral 8/4/2020    Procedure: 1) 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, 3.2 cm x 3.2 cm   2) paramedian forehead flap reconstruction of nasal tip with preservation of vascular pedicle   3) complex closure skin of forehead and scalp, 10.0 cm;  Surgeon: Jovan Trejo MD;  Location:  [facility] OR;  Service: ENT;  Laterality: Bilateral;   • HEAD/NECK LESION/CYST EXCISION N/A 9/15/2020    Procedure: Pedicle division and flap inset;  Surgeon: Jovan Trejo MD;  Location:  [facility] OR;  Service: ENT;  Laterality: N/A;   • HYSTERECTOMY     • KNEE SURGERY     • NOSE SURGERY          Allergies:Sulfa antibiotics and Local [lidocaine]  PTA Meds:  Medications Prior to Admission   Medication Sig Dispense Refill Last Dose   • albuterol sulfate  (90 Base) MCG/ACT inhaler Inhale 2 puffs Every 6 (Six) Hours As Needed for Wheezing.   Unknown at Unknown time   • bumetanide (BUMEX) 0.5 MG tablet Take 1 tablet by mouth Daily As Needed (EDEMA). 30 tablet 3 Unknown at Unknown time   • dilTIAZem CD (CARDIZEM CD) 120 MG 24 hr capsule Take 1 capsule by mouth Daily. 30 capsule 11 Unknown at Unknown time   • estradiol (ESTRACE) 1 MG tablet Take 1 mg by mouth Daily.   Unknown at Unknown time   • HYDROcodone-acetaminophen (NORCO)  MG per tablet Take 2 tablets by mouth 3 (Three) Times a Day.   Unknown at Unknown time   • losartan (COZAAR) 50 MG tablet Take 50 mg by mouth Daily.   Unknown at Unknown time   • metoprolol succinate XL (TOPROL-XL) 100 MG 24 hr tablet Take 50 mg by mouth 2 (Two) Times a Day.   Unknown at Unknown time   • montelukast (SINGULAIR) 10 MG tablet Take 10 mg by mouth Daily.   Unknown at Unknown time   • omeprazole (priLOSEC) 40 MG capsule Take 40 mg by mouth Daily.   Unknown at Unknown time      Current Meds:   Current Facility-Administered  Medications   Medication Dose Route Frequency Provider Last Rate Last Dose   • acetaminophen (TYLENOL) tablet 650 mg  650 mg Oral Q4H PRN Jovan Juarez DO        Or   • acetaminophen (TYLENOL) 160 MG/5ML solution 650 mg  650 mg Oral Q4H PRN Jovan Juarez DO        Or   • acetaminophen (TYLENOL) suppository 650 mg  650 mg Rectal Q4H PRN Jovan Juarez DO       • budesonide (PULMICORT) nebulizer solution 0.5 mg  0.5 mg Nebulization BID - RT Mary Montanez MD   0.5 mg at 11/16/20 0732   • chlorhexidine (PERIDEX) 0.12 % solution 15 mL  15 mL Mouth/Throat Q12H Regan Hampton DO   15 mL at 11/15/20 2014   • dexmedetomidine HCl (PRECEDEX) 400 mcg in sodium chloride 0.9 % 100 mL infusion  0.1-1.5 mcg/kg/hr Intravenous Titrated Jovan Juarez DO 3.3 mL/hr at 11/16/20 0647 0.2 mcg/kg/hr at 11/16/20 0647   • famotidine (PEPCID) injection 20 mg  20 mg Intravenous BID Regan Hampton DO   20 mg at 11/15/20 2014   • fentaNYL citrate (PF) (SUBLIMAZE) 1,250 mcg in sodium chloride 0.9 % 250 mL (5 mcg/mL) infusion  0.7 mcg/kg/hr Intravenous Continuous Regan Hampton DO 9.24 mL/hr at 11/16/20 0436 0.7 mcg/kg/hr at 11/16/20 0436   • ipratropium-albuterol (DUO-NEB) nebulizer solution 3 mL  3 mL Nebulization Q4H - RT Regan Hampton DO   3 mL at 11/16/20 0732   • LORazepam (ATIVAN) injection 0.5 mg  0.5 mg Intravenous Q8H Jovan Juarez DO        Followed by   • LORazepam (ATIVAN) injection 0.5 mg  0.5 mg Intravenous Q6H Jovan Juarez DO   0.5 mg at 11/15/20 2323   • LORazepam (ATIVAN) injection 1 mg  1 mg Intravenous Q4H PRN Regan Hampton DO   1 mg at 11/15/20 0400   • metoprolol tartrate (LOPRESSOR) tablet 25 mg  25 mg Nasogastric Q12H Rodolfo Menendez MD   25 mg at 11/15/20 0855   • nicotine (NICODERM CQ) 21 MG/24HR patch 1 patch  1 patch Transdermal Q24H Demetri Ramirez APRN   1 patch at 11/15/20 0904   • ondansetron (ZOFRAN) injection 4 mg  4 mg Intravenous Q6H PRN Regan Hampton  DO Rima       • potassium chloride 10 mEq in 100 mL IVPB  10 mEq Intravenous Q1H PRN Regan Hampton DO       • potassium phosphate 45 mmol in sodium chloride 0.9 % 500 mL infusion  45 mmol Intravenous PRN Regan Hampton DO        Or   • potassium phosphate 30 mmol in sodium chloride 0.9 % 250 mL infusion  30 mmol Intravenous PRN Regan Hampton DO 31.3 mL/hr at 11/13/20 0540 30 mmol at 11/13/20 0540    Or   • Potassium Phosphates 15 mmol in sodium chloride 0.9 % 100 mL infusion  15 mmol Intravenous PRN Regan Hampton DO   15 mmol at 11/15/20 0430    Or   • sodium phosphates 45 mmol in sodium chloride 0.9 % 500 mL IVPB  45 mmol Intravenous PRN Regan Hampton DO        Or   • sodium phosphates 30 mmol in sodium chloride 0.9 % 250 mL IVPB  30 mmol Intravenous PRN Regan Hampton DO        Or   • sodium phosphates 15 mmol in sodium chloride 0.9 % 250 mL IVPB  15 mmol Intravenous PRN Regan Hampton DO       • sodium chloride 0.9 % flush 10 mL  10 mL Intravenous PRN Atif Mckoy MD       • sodium chloride 0.9 % flush 10 mL  10 mL Intravenous PRN Atif Mckoy MD       • sodium chloride 0.9 % flush 10 mL  10 mL Intravenous Q12H Regan Hampton DO   10 mL at 11/15/20 2014   • sodium chloride 0.9 % flush 10 mL  10 mL Intravenous PRN Regan Hampton DO           Family History:family history includes Breast cancer in her mother; Cancer in her brother, maternal grandfather, and paternal grandmother; Colon cancer in her mother; Leukemia in her mother; Prostate cancer in her father; Skin cancer in her father.   Social History: reports that she has been smoking cigarettes. She has a 50.00 pack-year smoking history. She has never used smokeless tobacco. She reports current alcohol use. She reports that she does not use drugs.    Review of Systems  Review of Systems   Unable to perform ROS: Intubated         Objective      Vital Signs   Temp:  [96.5 °F (35.8 °C)-98.1 °F  (36.7 °C)] 97.2 °F (36.2 °C)  Heart Rate:  [] 72  Resp:  [26-44] 32  BP: ()/(32-86) 74/40  FiO2 (%):  [50 %-60 %] 60 %    Physical Exam   Vital Signs   Temp:  [98.4 °F (36.9 °C)-100.5 °F (38.1 °C)] 98.4 °F (36.9 °C)  Heart Rate:  [] 113  Resp:  [17-36] 26  BP: ()/(44-87) 115/65  FiO2 (%):  [40 %] 40 %     Physical Exam  Constitutional:       Appearance: Normal appearance. She is normal weight.      Interventions: She is sedated and intubated.   HENT:      Head: Normocephalic and atraumatic.      Comments: Some well healed area on the nose     Nose: Nose normal.      Mouth/Throat:      Mouth: Mucous membranes are dry.   Eyes:      Pupils: Pupils are equal, round, and reactive to light.   Neck:      Musculoskeletal: Neck supple.   Cardiovascular:      Rate and Rhythm: Normal rate and regular rhythm.      Pulses: Normal pulses.   Pulmonary:      Effort: Pulmonary effort is normal. She is intubated.      Breath sounds: Normal breath sounds. Decreased air movement present.   Abdominal:      General: Abdomen is flat. Bowel sounds are normal.      Palpations: Abdomen is soft. There is no hepatomegaly or splenomegaly.   Musculoskeletal: Normal range of motion.      Comments: No noted cyanosis of the fingers nor toes   Skin:     General: Skin is warm and dry.   Neurological:      Comments: Cannot assess due to intubation and sedation   Psychiatric:      Comments: Cannot assess - intubated an d sedated     Results from last 7 days   Lab Units 11/16/20  0305 11/15/20  0227 11/14/20  0500   WBC 10*3/mm3 10.46 10.80 10.05   HEMOGLOBIN g/dL 7.8* 7.7* 7.6*   HEMATOCRIT % 25.1* 24.2* 24.6*   PLATELETS 10*3/mm3 44* 54* 54*        Results from last 7 days   Lab Units 11/16/20  0305 11/15/20  0227 11/14/20  0500   SODIUM mmol/L 155* 150* 148*   POTASSIUM mmol/L 3.5 3.7 3.8   CHLORIDE mmol/L 121* 115* 112*   CO2 mmol/L 24.0 24.0 24.0   BUN mg/dL 24* 16 17   CREATININE mg/dL 0.57 0.54* 0.53*   CALCIUM mg/dL 9.0  9.2 8.8   BILIRUBIN mg/dL 4.0* 3.8* 2.5*   ALK PHOS U/L 129* 129* 126*   ALT (SGPT) U/L 44* 48* 46*   AST (SGOT) U/L 96* 130* 137*   GLUCOSE mg/dL 120* 124* 112*       ABG:    pH, Arterial   Date Value Ref Range Status   11/16/2020 7.393 7.350 - 7.450 pH units Final     pO2, Arterial   Date Value Ref Range Status   11/16/2020 60.5 (L) 83.0 - 108.0 mm Hg Final     Comment:     84 Value below reference range     pCO2, Arterial   Date Value Ref Range Status   11/16/2020 37.3 35.0 - 45.0 mm Hg Final       Culture Data:   Blood Culture   Date Value Ref Range Status   11/12/2020 No growth at 3 days  Preliminary   11/12/2020 No growth at 3 days  Preliminary     Respiratory Culture   Date Value Ref Range Status   11/13/2020   Final    Light growth (2+) Normal Respiratory Kristen: NO S.aureus/MRSA or Pseudomonas aeruginosa       Radiology:   Imaging Results (Last 7 Days)     Procedure Component Value Units Date/Time    XR Chest 1 View [[id number]] Collected: 11/15/20 0716     Updated: 11/15/20 0721    Narrative:      Frontal supine radiograph of the chest 11/15/2020 3:45 AM CST     History: Intubated Patient; A41.9-Sepsis, unspecified organism;  R65.20-Severe sepsis without septic shock; J18.9-Pneumonia, unspecified  organism; J96.01-Acute respiratory failure with hypoxia;  M62.82-Rhabdomyolysis; D68.9-Coagulation defect, unspecified     Comparison: 11/14/2020      Findings:      Endotracheal tube in place with tip projecting 2.7 cm above the avery.  Bilateral infiltrates are present, in the left infrahilar and left  basilar areas as well as right upper lobe adjacent to the minor fissure.  Right upper lobe infiltrate is more faint on today's exam. There is a  new mild atelectasis in the right lung base. No pneumothorax or pleural  effusion. Enteric tube courses below the diaphragm with tip beyond the  field of view. Heart size is stable. The pulmonary vasculature are  nondilated. Incidentally noted anterior cervical fusion  hardware. No  acute bony abnormality.       Impression:      Impression:   1. There is a new mild atelectasis in the right lung base.  2. Left perihilar and left basilar infiltrates are not significantly  changed. The right upper lobe infiltrate is more faint on today's exam  and may be improving.  This report was finalized on 11/15/2020 07:18 by Dr Marcellus Joyce, .    XR Chest 1 View [[id number]] Collected: 11/14/20 0848     Updated: 11/14/20 0854    Narrative:      Frontal supine radiograph of the chest 11/14/2020 4:23 AM CST     History: Intubated Patient; A41.9-Sepsis, unspecified organism;  R65.20-Severe sepsis without septic shock; J18.9-Pneumonia, unspecified  organism; J96.01-Acute respiratory failure with hypoxia;  M62.82-Rhabdomyolysis; D68.9-Coagulation defect, unspecified     Comparison: 11/13/2020      Findings:      Endotracheal tube in place with tip projecting 1.9 cm above the avery.  Bilateral infiltrates are present, in the left infrahilar and left  basilar areas as well as right upper lobe adjacent to the minor fissure.  Infiltrates are not significantly changed. Lung volumes are stable. No  pneumothorax or pleural effusion. Enteric tube courses below the  diaphragm with tip beyond the field of view. Heart size is stable. The  pulmonary vasculature are nondilated. Incidentally noted anterior  cervical fusion hardware. No acute bony abnormality.       Impression:      Impression:   1. Endotracheal tube has been withdrawn slightly although the position  remains low. Tube tip projects 1.9 cm above the avery. Additional 1-2  cm withdrawal would be suggested.  2. Faint left basilar, left perihilar and right upper lobe infiltrates  are not significant change.  3. Lung volumes are stable.     This report was finalized on 11/14/2020 08:51 by Dr Marcellus Joyce, .    US Liver [[id number]] Collected: 11/13/20 1544     Updated: 11/13/20 1557    Narrative:      US LIVER-11/13/2020 1:07 PM CST     REASON FOR  EXAM: Elevated enzymes; A41.9-Sepsis, unspecified organism;  R65.20-Severe sepsis without septic shock; J18.9-Pneumonia, unspecified  organism; J96.01-Acute respiratory failure with hypoxia;  M62.82-Rhabdomyolysis; D68.9-Coagulation defect, unspecified       COMPARISON: NONE      TECHNIQUE: Multiple longitudinal and transverse realtime sonographic  images of the right upper quadrant of the abdomen are obtained.      FINDINGS:    Pancreas: Normal in size and echogenicity.      Liver: Normal in size, echogenicity and echotexture. No focal lesion.  There appears to be bidirectional flow within the portal vein without  evidence of portal venous thrombosis. There is increased echogenicity of  the liver parenchyma in comparison the right kidney suggesting mild  steatosis of the liver. No evidence of focal hepatic mass.     Gallbladder: No intraluminal echoes, wall thickening, or pericholecystic  fluid.      Bile ducts: The CBD measures 0.6 cm in diameter. There is no  intrahepatic or extrahepatic ductal dilation.      Right kidney: Normal in size, shape and contour. No mass, hydronephrosis  or calculi. No perinephric fluid collection.       Other: The visualized abdominal aorta is normal in caliber.        Impression:         1. Increased echogenicity of liver parenchyma suggesting steatosis of  the liver. No evidence of focal hepatic mass.  2. Normal appearance of the gallbladder.        This report was finalized on 11/13/2020 15:54 by Dr. Lorenzo Aquino MD.    XR Chest 1 View [[id number]] Collected: 11/13/20 1530     Updated: 11/13/20 1537    Narrative:      EXAMINATION: Chest 1 view 11/13/2020     HISTORY: Hypoxemia     FINDINGS: Today's exam is compared to previous study of earlier same  day. There is persistent left lingular and left lower lobe infiltrate as  well as right upper lobe infiltrate abutting the fissure. These are  stable taking into account some differences in technique. The  endotracheal tube is  slightly more advanced with the tip at the avery  pointing towards right mainstem bronchus. An NG tube is been advanced  and is within the stomach.       Impression:      . Left lingular and left lower lobe infiltrate as well as  right upper limit lobe infiltrate stable from today's earlier exam.  2. Endotracheal tube is gone slightly advanced with the tip at the  avery and pointed towards right mainstem bronchus. I notified the  nursing staff in the CCU of the endotracheal tube position and  recommended partial withdrawal at 3:33 PM.  This report was finalized on 11/13/2020 15:34 by Dr. Lorenzo Aquino MD.    US Venous Doppler Lower Extremity Bilateral (duplex) [[id number]] Collected: 11/13/20 1507     Updated: 11/13/20 1510    Narrative:      History: Swelling       Impression:      Impression: There is no evidence of deep venous thrombosis or  superficial thrombophlebitis of right or left lower extremities.     Comments: Bilateral lower extremity venous duplex exam was performed  using color Doppler flow, Doppler waveform analysis, and grayscale  imaging, with and without compression. There is no evidence of deep  venous thrombosis in the common femoral, superficial femoral, popliteal,  peroneal, anterior tibial, and posterior tibial veins bilaterally. No  thrombus is identified in the saphenofemoral junctions and greater  saphenous veins bilaterally.         This report was finalized on 11/13/2020 15:07 by Dr. Arvind Hoang MD.    XR Chest 1 View [[id number]] Collected: 11/13/20 0714     Updated: 11/13/20 0720    Narrative:      EXAMINATION: XR CHEST 1 VW-  11/13/2020 7:14 AM CST 1 view     HISTORY: Intubated Patient; A41.9-Sepsis, unspecified organism;  R65.20-Severe sepsis without septic shock; J18.9-Pneumonia, unspecified  organism; J96.01-Acute respiratory failure with hypoxia;  M62.82-Rhabdomyolysis; D68.9-Coagulation defect, unspecified     COMPARISON: 11/12/2020.     FINDINGS:   Patchy consolidation  in the RIGHT midlung and LEFT lower lung. Mild  perihilar interstitial opacities, decreased since the comparison study.  Mild pulmonary vascular prominence.      No change in the osseous structures. New enteric tube with tip  projecting over the stomach.          Impression:         1.  New enteric tube as discussed above.     2.  Multifocal consolidative changes in the lungs, similar to the prior  study. Findings suggestive of bilateral pneumonia.        This report was finalized on 11/13/2020 07:17 by Dr. Martinez Hernandez MD.    XR Abdomen KUB [[id number]] Collected: 11/13/20 0713     Updated: 11/13/20 0717    Narrative:      EXAMINATION: XR ABDOMEN KUB-  11/13/2020 7:13 AM CST     HISTORY: OG placement; A41.9-Sepsis, unspecified organism; R65.20-Severe  sepsis without septic shock; J18.9-Pneumonia, unspecified organism;  J96.01-Acute respiratory failure with hypoxia; M62.82-Rhabdomyolysis;  D68.9-Coagulation defect, unspecified      COMPARISON: 11/13/2020     FINDINGS:  Limited exam for evaluation of the enteric tube placement. The tube tip  projects over the body of the stomach. Endotracheal tube tip is  unchanged from the previous chest x-ray.     Evaluation for free intraperitoneal air is limited on a supine  radiograph, but there are no definite findings of free intraperitoneal  air.      The osseous structures demonstrate no acute abnormality.           Impression:         1.  Enteric tube tip projects over the stomach.         This report was finalized on 11/13/2020 07:14 by Dr. Martinez Hernandez MD.    CT Abdomen Pelvis Without Contrast [[id number]] Collected: 11/12/20 1936     Updated: 11/12/20 1941    Narrative:      EXAMINATION: CT ABDOMEN PELVIS WO CONTRAST-      11/12/2020 6:36 PM CST     HISTORY: sepsis, abd pain     In order to have a CT radiation dose as low as reasonably achievable  Automated Exposure Control was utilized for adjustment of the mA and/or  KV according to patient size.     DLP in mGycm=  839.     Noncontrast abdomen pelvis CT.     Normal noncontrast appearance of the liver, gallbladder, pancreas,  spleen, adrenal glands, and kidneys.  No renal stone or hydronephrosis.  Urinary bladder catheter present.     No bowel dilation.  No peritoneal fluid.     No bowel obstruction and no free air.     Patchy abdominal wall and mesenteric edema noted.     Summary:  1. No mass, abscess, or bowel obstruction.                                   This report was finalized on 11/12/2020 19:38 by Dr. Tay Crisostomo MD.    CT Chest Without Contrast [[id number]] Collected: 11/12/20 1935     Updated: 11/12/20 1939    Narrative:      EXAMINATION: CT CHEST WO CONTRAST-      11/12/2020 6:36 PM CST     HISTORY: Respiratory failure. Fall injury.     In order to have a CT radiation dose as low as reasonably achievable  Automated Exposure Control was utilized for adjustment of the mA and/or  KV according to patient size.     DLP in mGycm= 314.     Heart size is at the upper limits of normal.  Aortic calcification with no aneurysm.     Low-lying endotracheal tube with the tip 12 mm above the avery.     Granulomatous lymph node calcification within the middle mediastinum and  at the right hilum.     Bilateral infiltrate for which pneumonia is favored over pulmonary  edema.  No pneumothorax and no significant pleural effusion.     Summary:  1. Bilateral pneumonia.  2. Somewhat low-lying endotracheal tube with the tip 12 mm above the  avery.                                   This report was finalized on 11/12/2020 19:36 by Dr. Tay Crisostomo MD.    CT Maxillofacial Without Contrast [[id number]] Collected: 11/12/20 1934     Updated: 11/12/20 1938    Narrative:      EXAMINATION: CT MAXILLOFACIAL WO CONT-      11/12/2020 6:36 PM CST     HISTORY: Fall injury. Head and face trauma.     In order to have a CT radiation dose as low as reasonably achievable  Automated Exposure Control was utilized for adjustment of the mA and/or  KV according  to patient size.     DLP in mGycm= 158.     Endotracheal tube present.     Intact mandible and maxilla.  Intact nasal bones and zygomatic arches.     Bony orbits are intact.     Paranasal sinuses are essentially clear.     Summary:  1. No facial bone fracture.                                   This report was finalized on 11/12/2020 19:34 by Dr. Tay Crisostomo MD.    CT Cervical Spine Without Contrast [[id number]] Collected: 11/12/20 1932     Updated: 11/12/20 1937    Narrative:      EXAMINATION: CT CERVICAL SPINE WO CONTRAST-      11/12/2020 6:53 PM CST     HISTORY: Fall injury. Head and neck trauma.     In order to have a CT radiation dose as low as reasonably achievable  Automated Exposure Control was utilized for adjustment of the mA and/or  KV according to patient size.     DLP in mGycm= 374.     Axial, sagittal, and coronal noncontrast CT imaging.     Endotracheal tube present.     Vertebral bodies and posterior elements are intact.     Reconstructed sagittal images show normal curvature.   There is no malalignment. Prevertebral soft tissues are normal.   Facet joints align normally.     Reconstructed coronal images show normal lateral mass alignment.     C4-5, C5-6, and C6-7 discectomy with anterior plate and screw fusion.  Uncinate spurring with mild foraminal narrowing at C5-6 and C6-7.       Impression:      1. No acute fracture.     .                                   This report was finalized on 11/12/2020 19:33 by Dr. Tay Crisostomo MD.    CT Head Without Contrast [[id number]] Collected: 11/12/20 1923     Updated: 11/12/20 1927    Narrative:      EXAMINATION: CT HEAD WO CONTRAST-      11/12/2020 6:36 PM CST     HISTORY: Mental status change, unknown cause     In order to have a CT radiation dose as low as reasonably achievable  Automated Exposure Control was utilized for adjustment of the mA and/or  KV according to patient size.     DLP in mGycm= 640.     Noncontrast head CT compared with a brain MRI exam  from 6/10/2020.     Axial, sagittal, and coronal noncontrast CT imaging of the head.     The visualized paranasal sinuses are clear.     The brain and ventricles have an age appropriate appearance.   There is no hemorrhage or mass-effect.   No acute infarction is seen.     No calvarial abnormality.       Impression:      1. No acute intracranial abnormality is seen.                                         This report was finalized on 11/12/2020 19:24 by Dr. Tay Crisostomo MD.    XR Knee 1 or 2 View Right [[id number]] Collected: 11/12/20 1703     Updated: 11/12/20 1706    Narrative:      EXAMINATION: XR KNEE 1 OR 2 VW RIGHT-     11/12/2020 4:58 PM CST     HISTORY: Right knee pain and swelling.     Right knee, 2 views.     Extensive chondrocalcinosis of the tibiofemoral compartments.     Intact distal femur and proximal tibia and fibula.  Intact patella.     No significant joint effusion.     Summary:  1. Osteoarthritic joint changes.  2. No acute bony abnormality.     This report was finalized on 11/12/2020 17:03 by Dr. Tay Crisostomo MD.    XR Chest 1 View [[id number]] Collected: 11/12/20 1701     Updated: 11/12/20 1705    Narrative:      EXAMINATION: XR CHEST 1 VW-     11/12/2020 4:49 PM CST     HISTORY: Simple Sepsis Protocol     1 view chest x-ray compared with 9/25/2020.     Endotracheal tube is present. The tip lies 18 mm above the avery.     Magnified heart size.     Interstitial lung disease with patchy bilateral infiltrate most  prominent in the left perihilar region.     Summary:  1. Endotracheal tube present. The tip lies 18 mm above the avery.  2. Patchy bilateral pneumonia.  This report was finalized on 11/12/2020 17:02 by Dr. Tay Crisostomo MD.          Pathology Results:   Lab Results   Component Value Date    PATHINTERP  11/15/2020     Complete blood count and peripheral smear, review:  Anemia, borderline microcytic with target cells.  Thrombocytopenia.  Negative for schistocytes.    Patient has a  borderline microcytic anemia.  This is usually caused by iron deficiency.  In the appropriate setting, recommend serum ferritin and serum iron/TIBC.  It could also be seen with chronic blood loss.  Target cells can be seen in liver disease, post splenectomy, thalassemia, iron deficiency and hemoglobin C.  In general, thrombocytopenia can be categorized into causes due to: (1) decreased platelet production seen with bone marrow replacement by malignancy or with certain drugs (including thiazides, alcohol, estrogens); (2) increased platelet destruction; (3) increased sequestration (as with hypersplenism); (4) with platelet loss (as with hemorrhage or multiple blood transfusions).  It is noted that the patient is on vancomycin.  Vancomycin can sometimes be a cause of thrombocytopenia.       Results Review:          I reviewed the patient's new clinical results.       Assessment/Plan      Assessment/Plans:   Date  9/25/2019  10/1/2019  2/25/2020  5/22/2020:  7/28/2020  9/25/2020  11/12/2020  11/13/20  11/15/2020   WBC  3.8  5.14  5.6  4.9  4.62  7.41  8.22  8.31  10.80   Hb  8.3  7.5  11.4  11.6  8.8  10.5  9.6  8.7  7.7   MCV  77.7  78.3  89.6  86.3  85.2  83.1  81.8  80.6  88.7   Plt  142  144  206  199  132  218  73  64  54   ANC  2.5    4.1  3.2  2.96  4.91  7.02  7.81     ALC  1.1    1.1  1.3  1.05  1.73  0.35  0.08     nRBC          0.4 0    2.0     Ferritin  16.6                216.3   Iron  22 (L)                44   Iron SAT                  8%   Transferrin                  353   TIBC  594 (H)                526   Hapto                  66   LDH                     Keyshawn                  Neg   Rj                     Zinc                     Retic                  0.9%   B12  520                   Folate  8.4                <2.00 (!!!)   Hep panel                  Neg   HIV                  Neg   Creatinine  0.66  0.61  0.9  0.6  0.63  0.86    0.42  0.54   Glucose  101  124  127  124  110  141    97  124    AST  20  26  36  23  28  29    158  130   ALT  12  19  29  11  10  11    46  48   Alk phos  49  83  81  78  90  123    119  129   Total bilirubin                2.4  3.8   CRP              3.52       Fibrinogen              305    422   D-dimer              3.12       Lipase              156       Influenza              negative       Covid              negative       Drug screen              opiate positive       Antithrombin III              41 (L)       Fibrin split products             >20   >20   Myoglobulin                positive     LDH                23.5  800   PT                45     PTT                      RF                 10.8                            Anemia and thrombocytopenia in an acutely ill patient   -Anemia appears to be chronic over a long period of time but is gotten worse especially during her admission which is likely related to acute illness at this time  -Platelets have also been downtrending but have decreased significantly.Antibiotics are a likely culprate adding to thrombocytopenia  -At the time of her admission, the patient's platelet count were already low have not significantly decreased and likely reflect a DIC/sepsis picture.  - Although HIT cannot be ruled out as the patient is receiving low molecular weight heparin for DVT prophylaxis, it appears that her thrombocytopenia was present prior to receiving any heparin products  -Fibrinogen degradation split products as well as low Antithrombin III may be indicative of DIC and severe infection as the patient is being treated for a bilateral pneumonia  -As far as the anemia and thrombocytopenia, would add the following tests:  ? reticulocyte counts WNL  ? Folate LOW AT 2.0 (!!!!!) and should be replaced -- STARTED ON 11.16.20  ? B12 level WNL  ? Haptoglobin WNL  ? LDH elevated at 800  ? Peripheral smear  ? Coomb's (direct and indirect) Neg  ? Ferritin elevated likely as a acute phase reactant and should be rechecked if the  patient recovers from the acute event  ? Iron profile showing a low iron saturation and with her low MCV there may be a reflection of iron deficiency anemia and should be checked again after the patient recovers from her acute illness  ? Copper  ? Zinc  ? Liver and spleen ultrasound to rule out hepatosplenomegaly  ? HIV Neg  ? Hepatitis panels Neg    **Noted to have a low Antithrombin III  -Low Antithrombin III levels can be temporarily associated with other conditions other than Antithrombin III deficiency such as heparin therapy, DIC due to severe infections, severe trauma, severe burns or the presence of acute blood clots.  - This level should be rechecked after the patient recovers        ** Infection status:   · Influenza and Covid negative, blood cultures showing no growth urinalysis only showing trace ketones and moderate blood  -S pneumo and Legionella both negative  Patient being currently treated for bilateral pneumonia- on maxepime and started on rocephine (both are known to cause thrombocytopenia)  **Metabolic / Renal:   · Myoglobulin positive indicative of likely rhabdomyolysis  ** Endocrine:   · No current issues   ** Coagulation / VTE prophylaxis:   -Decrease Antithrombin III along with high fibrinogen degradation products may all be signs of DIC  -No evidence of DVTs on ultrasound of the lower extremities on 11.13.20  ** GI:   -Elevation in LFTs may be a reflection of severe hypotension affecting the liver as well as sepsis and should be checked over time  - unclear how much alcohol patient consumes but as per family she is alcohol dependent, as well as dependent on benzodiazepines and opiates  - The elevation in PT/PTT may again be a reflection of severe DIC and sepsis-but may also be evidence of a possibly failing liver and should be monitored and trended  -Ultrasound of the liver dated 11/12/2020: increased echogenicity of the liver parenchyma suggesting steatosis of the liver without evidence of  focal hepatic mass, no description of the spleen were given on this exam.  CT and pelvis  -CT abdomen pelvis on 11/12/2020 shows normal noncontrast appearance of the liver, gallbladder, pancreas, spleen, adrenal glands, kidneys  -GERD to be followed by hospitalist/MICU team  ** Pulmonary:   · Current smoking status current smoker which of course would be discouraged if the patient recovers from this episode  -COPD on home oxygen therapy being treated by pulmonary/hospitalist  ** Cardiology:   -Hypertension being followed by ICU/hospitalist  ** Skin / Rash:   · Biopsy from 5/21/2020 of the nose tip:1.  Skin, nasal dorsum lesion, punch biopsy: A.  Actinic elastosis of dermal collagen.B.  No histologic evidence of malignancy.2.  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  Described as infiltrative into the skeletal muscle and is seen on the peripheral margins of the biopsy without perineural invasion  -This will require Derm follow-up if not already done once the patient's acute issues have improved  -Positive margins of squamous cell carcinoma either need to be reresected or treatment with radiation plus minus systemic therapy and this will require outpatient evaluation both by radiation oncology as well as oncology.  If the patient recovers from her current acute condition, will need to follow-up either with Dr. Barbour or Dr. Block for possible need for systemic therapy  ** :   · Patient has been found to have urine blood which may be partially contributing to her anemia  ** Rheumatology  -History of arthritis may be contributing to the baseline anemia as part of anemia of chronic disease  -Check BETZY, rheumatoid factor  -CRP is elevated   ** Neurologic:   · No current issues   ** Psychosocial:   · No current issues as patient is intubated and sedated but patient has a history of anxiety  ** Pain control:   -Chronic back pain with questionable history of  substance abuse, opiate positive on admission  - as per RN, family reports ALCOHOL DEPENDENCE as well as DEPENDENCE OF BENZODIAZEPINES AND OPIATES        Thank you for allowing me to participate in the care of this patient.  Ami Goldberg, MD  Hematology/oncology

## 2020-11-16 NOTE — PROGRESS NOTES
Continued Stay Note   Jass     Patient Name: Muriel Hernandez  MRN: [id number]  Today's Date: 11/16/2020    Admit Date: 11/12/2020    Discharge Plan     Row Name 11/16/20 0948       Plan    Plan  Undetermined    Patient/Family in Agreement with Plan  yes    Plan Comments  Pt on vent in CCU at present time.  D/C plan undetermined at present time, note no plan to extubate today.  Pt lived at home alone before.  Will follow.        Discharge Codes    No documentation.             TEMO Schneider

## 2020-11-16 NOTE — PLAN OF CARE
Problem: Adult Inpatient Plan of Care  Goal: Plan of Care Review  Outcome: Ongoing, Progressing  Flowsheets (Taken 11/16/2020 1044)  Progress: no change  Plan of Care Reviewed With: other (see comments)  Outcome Summary: Pt is on vent support. She has an OG tube. Received consult to start enteral nutrition. She is not receiving propofol for sedation. Recommend to start Peptamen 1.5 based on est'd nutrient needs and vent settings. Recommend to start at 20mL/hour x 8 hours, then increase rate by 10mL every 4 hours to a goal rate 40mL/hour as tolerated. Recommend to flush OG tube with 30mL water every hour via pump. TF will provide 1320 kcal and 60 grams of protein. Will follow to establish TF tolerance.

## 2020-11-16 NOTE — CONSULTS
4fr 12cm Dual Power Midline inserted into left basilic vein with ultrasound guidance.  Flushes and draws without difficulty.  Dressing applied.

## 2020-11-16 NOTE — PLAN OF CARE
Goal Outcome Evaluation:  Plan of Care Reviewed With: patient  Progress: no change  Outcome Summary: BP was soft during day & at start of this shift. Bolus given x 2. Pressures improved. Breathing over the vent RR 40's most of the time sats low 90's. Was able to restart Precedex pt was very restless & agitated. Also restarted Fentanyl gtt. Pt looks much more comfortable & sats & RR improved. Will open eyes but not following any other commands. UOP marginal   No

## 2020-11-16 NOTE — PROGRESS NOTES
[facility] Medicine Services  INPATIENT PROGRESS NOTE    Patient Name: Muriel Hernandez  Date of Admission: 11/12/2020  Today's Date: 11/16/20  Length of Stay: 4  Primary Care Physician: Real Alicea MD    Subjective   Chief Complaint: Respiratory failure  HPI   Required boluses for soft blood pressure last night.  Blood pressure improved.  Continues to require ventilatory support.  Requiring Precedex for sedation.  Also started her on lorazepam yesterday as the family gave information that she has problems with alcohol abuse.    Restless at times.  She has not followed commands for the nursing staff, but withdraws to painful stimuli and will open her eyes.    Review of Systems   Unable to assess secondary to the patient's intubated and sedated state.     Objective    Temp:  [96.5 °F (35.8 °C)-98.4 °F (36.9 °C)] 96.5 °F (35.8 °C)  Heart Rate:  [] 92  Resp:  [18-44] 44  BP: ()/(32-86) 101/55  FiO2 (%):  [40 %-60 %] 60 %  Physical Exam  Constitutional:       Appearance: She is well-developed.      Comments: Chronically ill-appearing 60-year-old  female patient who is currently ventilated and sedated.  Seen and discussed with her nurse, Amira.   HENT:      Head: Normocephalic and atraumatic.   Eyes:      Conjunctiva/sclera: Conjunctivae normal.   Neck:      Musculoskeletal: Neck supple.      Vascular: No JVD.   Cardiovascular:      Rate and Rhythm: Normal rate and regular rhythm.      Heart sounds: Normal heart sounds. No murmur. No friction rub. No gallop.    Pulmonary:      Effort: Pulmonary effort is normal. No respiratory distress.      Breath sounds: Rhonchi present. No wheezing or rales.   Chest:      Chest wall: No tenderness.   Abdominal:      General: Bowel sounds are normal. There is no distension.      Palpations: Abdomen is soft.      Tenderness: There is no abdominal tenderness. There is no guarding or rebound.   Musculoskeletal: Normal  range of motion.         General: No tenderness or deformity.   Skin:     General: Skin is warm and dry.      Findings: No rash.   Neurological:      General: No focal deficit present.      Motor: Weakness present. No abnormal muscle tone.      Deep Tendon Reflexes: Reflexes normal.      Comments: Moves all extremities equally to painful stimuli.          Intake/Output Summary (Last 24 hours) at 11/16/2020 0724  Last data filed at 11/16/2020 0421  Gross per 24 hour   Intake 534.33 ml   Output 775 ml   Net -240.67 ml     Results Review:  I have reviewed the labs, radiology results, and diagnostic studies.    Laboratory Data:   Results from last 7 days   Lab Units 11/16/20  0305 11/15/20  0227 11/14/20  0500   WBC 10*3/mm3 10.46 10.80 10.05   HEMOGLOBIN g/dL 7.8* 7.7* 7.6*   HEMATOCRIT % 25.1* 24.2* 24.6*   PLATELETS 10*3/mm3 44* 54* 54*     Results from last 7 days   Lab Units 11/16/20  0305 11/15/20  0227 11/14/20  0500   SODIUM mmol/L 155* 150* 148*   POTASSIUM mmol/L 3.5 3.7 3.8   CHLORIDE mmol/L 121* 115* 112*   CO2 mmol/L 24.0 24.0 24.0   BUN mg/dL 24* 16 17   CREATININE mg/dL 0.57 0.54* 0.53*   CALCIUM mg/dL 9.0 9.2 8.8   BILIRUBIN mg/dL 4.0* 3.8* 2.5*   ALK PHOS U/L 129* 129* 126*   ALT (SGPT) U/L 44* 48* 46*   AST (SGOT) U/L 96* 130* 137*   GLUCOSE mg/dL 120* 124* 112*     Results from last 7 days   Lab Units 11/16/20  0055   PH, ARTERIAL pH units 7.393   PO2 ART mm Hg 60.5*   PCO2, ARTERIAL mm Hg 37.3   HCO3 ART mmol/L 22.7   FiO2 (%):  [40 %-60 %] 60 %  S RR:  [18-26] 26  PEEP/CPAP (cm H2O):  [5 cm H20-7.5 cm H20] 7.5 cm H20  ID SUP:  [0 cm H20] 0 cm H20  MAP (cm H2O):  [14-20] 20    I have reviewed the patient's current medications.     Assessment/Plan     Active Hospital Problems    Diagnosis   • **Bilateral pneumonia   • Severe sepsis (CMS/HCC)   • Lactic acidosis   • Acute respiratory failure with hypoxia (CMS/HCC)   • Transaminitis   • Thrombocytopenia (CMS/HCC)   • Rhabdomyolysis   • Hypothermia   •  Seizures (CMS/HCC)   • Current every day smoker   • COPD (chronic obstructive pulmonary disease) (CMS/HCC)   • Chronic pain   • Normocytic anemia     Plan:  The patient was admitted on 11/12 Dr. Hampton.  She presented after an unresponsive episode.  She was found at home by her family.  She was hypothermic.  She had a work-up and imaging in the emergency department that found her to have bilateral pneumonia.  She has required mechanical ventilation since presentation.     Pulmonary continues to follow. Continue sedation with Precedex and fentanyl.  Awaiting for her to be appropriate for spontaneous breathing trials.  She will not be appropriate today.  She is on 70% FiO2 and remains tachypneic.     COVID-19 testing negative x 2 ([place], [facility]).  Expanded respiratory PCR panel negative. Influenza negative.  Blood cultures with no growth to date.  Streptococcal and Legionella urinary antigens negative. No MRSA detected on nasal screen. Sputum culture with budding yeast.  Continue cefepime for now.  Vancomycin stopped on 11/15.     She very likely has had some mild rhabdomyolysis given her presentation.  CPK downtrended. Troponin was slightly elevated and the trend was flat. Transaminases still elevated with a rising bilirubin.  CT of the abdomen pelvis showed a normal appearance of the liver, gallbladder, and pancreas. Ultrasound of the liver showed steatosis.     She remains thrombocytopenic.  This may be secondary to history of bone marrow suppression from alcohol abuse and sepsis. Hematology consulted and their notes are reviewed.    CIWA protocol.    Nicotine patch applied.     Nutrition consult for tube feeding.  It does not appear that she can wean today.    Pepcid for peptic ulcer prophylaxis.     SCDs for DVT prophylaxis.    Electronically signed by Jovan Juarez DO, 11/16/20, 07:24 CST.

## 2020-11-17 NOTE — PROGRESS NOTES
"Pharmacy Dosing Service  Pharmacokinetics  Vancomycin Initial Evaluation  Assessment/Action/Plan:  Loading dose - None indicated at this time, patient has previously been on vancomycin  Consult duration: 7 days  Indication: sepsis  Initiating Order: Vancomycin 750 mg IVPB every 24 hours  Current end date:final dose 11/23/20 at 1300  Levels: None ordered at this time - see pharmacokinetic predictions below  Additional antimicrobial agent(s): Zosyn    Vancomycin dosage initiated based on population pharmacokinetic parameters.     Pharmacy to dose Zosyn for sepsis. Initiated Zosyn 4.5 g IV once over 30 minutes, followed by 4.5 g IV every 8 hours via extended infusion.       Pharmacy will continue to follow daily and adjust dose accordingly.     Subjective:  Muriel Hernandez is a 60 y.o. female with a Pharmacy to dose Zosyn and with a Vancomycin \"Pharmacy to Dose\" consult for the treatment of sepsis  day 1 of 7 of treatment.    AUC Model Data:  Regimen: 750 mg every 24 hours  Start time: 1300 on 11/17/2020  Exposure target: AUC24 (range)400-600 mg/L.hr  AUC24,ss: 438 mg/L.hr  PAUC*: 77 %  Ctrough,ss: 12.4 mg/L  Pconc*: 0 %  Tox.: 8 %      Objective:  Ht: 152.4 cm (60\"); Wt: 64.8 kg (142 lb 14.4 oz)  Estimated Creatinine Clearance: 49 mL/min (by C-G formula based on SCr of 1 mg/dL).   Creatinine   Date Value Ref Range Status   11/17/2020 1.00 0.57 - 1.00 mg/dL Final   11/16/2020 0.57 0.57 - 1.00 mg/dL Final   11/15/2020 0.54 (L) 0.57 - 1.00 mg/dL Final   06/10/2020 0.80 0.60 - 1.30 mg/dL Final     Comment:     Serial Number: [id number]:  [id number]   05/22/2020 0.6 0.5 - 0.9 mg/dL Final   02/25/2020 0.9 0.5 - 0.9 mg/dL Final   10/03/2019 0.8 0.5 - 0.9 mg/dL Final      Lab Results   Component Value Date    WBC 14.16 (H) 11/17/2020    WBC 10.46 11/16/2020    WBC 10.80 11/15/2020      Baseline culture results:  Microbiology Results (last 10 days)       Procedure Component Value - Date/Time    COVID PRE-OP / " PRE-PROCEDURE SCREENING ORDER (NO ISOLATION) - Swab, Nasal Cavity [[id number]]  (Normal) Collected: 11/14/20 0948    Lab Status: Final result Specimen: Swab from Nasal Cavity Updated: 11/14/20 1056    Narrative:      The following orders were created for panel order COVID PRE-OP / PRE-PROCEDURE SCREENING ORDER (NO ISOLATION) - Swab, Nasal Cavity.  Procedure                               Abnormality         Status                     ---------                               -----------         ------                     COVID-19,[facility]...[[id number]]  Normal              Final result                 Please view results for these tests on the individual orders.    COVID-19,[facility] IN-HOUSE,Nasal Swab No Transport Media 3-4 HR TAT - Swab, Nasal Cavity [[id number]]  (Normal) Collected: 11/14/20 0948    Lab Status: Final result Specimen: Swab from Nasal Cavity Updated: 11/14/20 1056     COVID19 Not Detected    Narrative:      Fact sheet for providers: https://www.fda.gov/media/464157/download     Fact sheet for patients: https://www.fda.gov/media/122307/download  Fact sheet for providers: https://www.fda.gov/media/281741/download     Fact sheet for patients: https://www.fda.gov/media/127310/download    Respiratory Culture - Sputum, ET Suction [[id number]] Collected: 11/13/20 2154    Lab Status: Final result Specimen: Sputum from ET Suction Updated: 11/15/20 0839     Respiratory Culture Light growth (2+) Normal Respiratory Kristen: NO S.aureus/MRSA or Pseudomonas aeruginosa     Gram Stain Rare (1+) WBCs seen      Many (4+) Budding yeast with pseudohyphae    S. Pneumo Ag Urine or CSF - Urine, Urine, Catheter [[id number]]  (Normal) Collected: 11/13/20 0933    Lab Status: Final result Specimen: Urine, Catheter Updated: 11/13/20 1059     Strep Pneumo Ag Negative    Legionella Antigen, Urine - Urine, Urine, Catheter [[id number]]  (Normal) Collected: 11/13/20 0933    Lab Status: Final result Specimen: Urine, Catheter Updated:  11/13/20 1059     LEGIONELLA ANTIGEN, URINE Negative    MRSA Screen, PCR (Inpatient) - Swab, Nares [[id number]]  (Normal) Collected: 11/13/20 0746    Lab Status: Final result Specimen: Swab from Nares Updated: 11/13/20 0955     MRSA PCR No MRSA Detected    Respiratory Panel, PCR (WITHOUT COVID) - Swab, Nasopharynx [[id number]]  (Normal) Collected: 11/13/20 0746    Lab Status: Final result Specimen: Swab from Nasopharynx Updated: 11/13/20 0901     ADENOVIRUS, PCR Not Detected     Coronavirus 229E Not Detected     Coronavirus HKU1 Not Detected     Coronavirus NL63 Not Detected     Coronavirus OC43 Not Detected     Human Metapneumovirus Not Detected     Human Rhinovirus/Enterovirus Not Detected     Influenza B PCR Not Detected     Parainfluenza Virus 1 Not Detected     Parainfluenza Virus 2 Not Detected     Parainfluenza Virus 3 Not Detected     Parainfluenza Virus 4 Not Detected     Bordetella pertussis pcr Not Detected     Influenza A H1 2009 PCR Not Detected     Chlamydophila pneumoniae PCR Not Detected     Mycoplasma pneumo by PCR Not Detected     Influenza A PCR Not Detected     Influenza A H3 Not Detected     Influenza A H1 Not Detected     RSV, PCR Not Detected     Bordetella parapertussis PCR Not Detected    Narrative:      The coronavirus on the RVP is NOT COVID-19 and is NOT indicative of infection with COVID-19.     Blood Culture - Blood, Arm, Right [[id number]] Collected: 11/12/20 1720    Lab Status: Preliminary result Specimen: Blood from Arm, Right Updated: 11/16/20 1745     Blood Culture No growth at 4 days    Influenza Antigen, Rapid - Swab, Nasopharynx [[id number]]  (Normal) Collected: 11/12/20 1653    Lab Status: Final result Specimen: Swab from Nasopharynx Updated: 11/12/20 1723     Influenza A Ag, EIA Negative     Influenza B Ag, EIA Negative    Narrative:      Recommend confirmation of negative results by viral culture or molecular assay.    COVID PRE-OP / PRE-PROCEDURE SCREENING ORDER (NO ISOLATION) -  Swab, Nasal Cavity [[id number]]  (Normal) Collected: 11/12/20 1653    Lab Status: Final result Specimen: Swab from Nasal Cavity Updated: 11/12/20 1730    Narrative:      The following orders were created for panel order COVID PRE-OP / PRE-PROCEDURE SCREENING ORDER (NO ISOLATION) - Swab, Nasal Cavity.  Procedure                               Abnormality         Status                     ---------                               -----------         ------                     COVID-19, [facility] IN-HOUS...[[id number]]  Normal              Final result                 Please view results for these tests on the individual orders.    COVID-19, [facility]-HOUSE,NP Swab (NO TRANSPORT MEDIA) 2 HR TAT - Swab, Nasal Cavity [[id number]]  (Normal) Collected: 11/12/20 1653    Lab Status: Final result Specimen: Swab from Nasal Cavity Updated: 11/12/20 1730     COVID19 Not Detected    Narrative:      Fact sheet for providers: https://www.fda.gov/media/386744/download     Fact sheet for patients: https://www.fda.gov/media/467768/download    Blood Culture - Blood, Arm, Left [[id number]] Collected: 11/12/20 1611    Lab Status: Preliminary result Specimen: Blood from Arm, Left Updated: 11/16/20 1630     Blood Culture No growth at 4 days            Bernardino Mckay, PharmD  11/17/20 12:08 CST

## 2020-11-17 NOTE — PROGRESS NOTES
The patient has had several episodes of desaturation and hypotension this afternoon.  She continues to be acidemic.    We have made great efforts to reach out to her daughter to clarify CODE STATUS and if she wants us to continue to be aggressive in her mother's care.  Her father and her aunt have been contacted.  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  They state that she can take her father home tomorrow.  They also tell us that the patient and her daughter have somewhat of an estranged relationship.  The sister and the ex- have no desire to assist in decision-making at this time.  They do understand that the daughter is the primary decision maker.  They do understand that the patient has a high likelihood of undergoing a cardiac arrest at some point today.    At present, we have her a bit more stable on norepinephrine and phenylephrine.  We have bolused additional saline.  We have stopped the Zosyn and started meropenem to broaden her antibiotic coverage.    We have had to bag her several times.  We have made significant ventilator changes.    I would like to repeat a CT scan of the chest, abdomen, and pelvis and and planned to do so earlier, but she is too unstable to go to CT at present.  She continues to be septic.  She has a rising lactate.    Seen and discussed with multiple CCU nurses including Abigail Dickson Catherine, and [facility].    Electronically signed by Jovan Juarez DO, 11/17/20, 1:59 PM CST.      We have given her sodium bicarbonate.  I will start her on a sodium bicarb drip.  I have added vasopressin.  She keeps having episodes of desaturating and worsening hypotension.  We will intervene with bagging instead of ventilating and she will improved.  We are repeating a chest x-ray.  Pulmonary been notified.    Still no word from her daughter.     Electronically signed by Jovan Juarez DO, 11/17/20, 2:43 PM CST.    > 60  minutes of critical care time were spent managing the patient exclusive of billable procedures.

## 2020-11-17 NOTE — PROGRESS NOTES
Continued Stay Note   [facility]     Patient Name: Muriel Hernandez  MRN: [id number]  Today's Date: 11/17/2020    Admit Date: 11/12/2020    Discharge Plan     Row Name 11/17/20 1426       Plan    Plan  Attempting to contact daughter. Delmy Thomas, regarding end of life issues. Dr Juarez noted in md progress notes multiple attempts. No answer. Message left on voice mail for daughter to please return call ASAP. Pending return call.        Discharge Codes    No documentation.             Addis Albrecht RN

## 2020-11-17 NOTE — PROGRESS NOTES
[facility] Medicine CODE Note       Date of Encounter: 11/17/20    Attending Physician: Dr. Juarez    Description of Events:   The patient ultimately had a PEA arrest.  We provided ACLS measures.  She was given epinephrine and regained pulse after the first round.  She quickly lost pulse again and went PEA for a second time.  We provided another round of ACLS.  And myself and the code team felt that continuing to resuscitate her was futile given her problems.  We decided to terminate resuscitative efforts.  She passed away at 1514.    Outcome: Passed at 1514.     Electronically signed by Jovan Juarez DO, 11/17/2020, 15:23 CST.

## 2020-11-17 NOTE — PROGRESS NOTES
Pt off and on vent and manually bagged with 100% FIO2 and peep valve set at 10 from 1300 to 1514 at time of death trying to keep sat at 90% or greater. Also tried changed vent to AC/VC + at RR 32 450 VT and 10 peep and 100% oxygen at 1351. Pulmonary notified.

## 2020-11-17 NOTE — PROGRESS NOTES
PULMONARY AND CRITICAL CARE PROGRESS NOTE - [facility]    Patient: Muriel Hernandez    1960    MR# [id number]    Acct# [id number]  11/17/20   08:43 CST  Referring Provider: Jovan Juarez DO    Chief Complaint: Mechanically ventilated    Interval history: She remains on the ventilator day #6.  Levophed, Precedex and fentanyl are infusing.  She is hypothermic and has been placed on the warmer overnight.  She looks very sickly.  ABGs are acidotic.  A chest x-ray has been ordered and is pending.  She is requiring increased ventilator support.  She has had decreased urine output overnight.  Nursing has had to turn nutrition off due to increased residuals.    Meds:  budesonide, 0.5 mg, Nebulization, BID - RT  cefTRIAXone, 1 g, Intravenous, Q24H  chlorhexidine, 15 mL, Mouth/Throat, Q12H  famotidine, 20 mg, Intravenous, BID  folic acid, 1 mg, Oral, Daily  ipratropium-albuterol, 3 mL, Nebulization, Q4H - RT  LORazepam, 0.5 mg, Intravenous, Q8H  metoprolol tartrate, 25 mg, Nasogastric, Q12H  nicotine, 1 patch, Transdermal, Q24H  predniSONE, 20 mg, Nasogastric, Q8H  sodium bicarbonate, 50 mEq, Intravenous, Once  sodium chloride, 10 mL, Intravenous, Q12H      dexmedetomidine, 0.1-1.5 mcg/kg/hr, Last Rate: 0.2 mcg/kg/hr (11/17/20 0026)  dextrose, 50 mL/hr  fentanyl, 0.7 mcg/kg/hr, Last Rate: 0.7 mcg/kg/hr (11/17/20 0509)  norepinephrine, 0.02-0.3 mcg/kg/min, Last Rate: 0.06 mcg/kg/min (11/17/20 0105)      Review of Systems:   Cannot obtain due to mechanical ventilation.  The patient notably is critically ill and connected to a ventilator.  As such patient cannot communicate and provide any history whatsoever, including any history of present illness or interval history since arrival or review of systems. The interested reviewer may note this fact, as an attempt has been made at collecting and documenting these portions of the patient history, but this information is unobtainable despite attempted  review and therefore cannot be documented at this time.   Ventilator Settings:        Resp Rate (Set): 26  Pressure Support (cm H2O): 0 cm H20  FiO2 (%): 80 %  PEEP/CPAP (cm H2O): 7.5 cm H20  Minute Ventilation (L/min) (Obs): 14.1 L/min  Resp Rate (Observed) Vent: 30  I:E Ratio (Set): 1:1.3  I:E Ratio (Obs): 1:2.0  PIP Observed (cm H2O): 32 cm H2O     Physical Exam:  Temp:  [93.4 °F (34.1 °C)-98.2 °F (36.8 °C)] 93.4 °F (34.1 °C)  Heart Rate:  [60-95] 84  Resp:  [6-34] 30  BP: ()/(28-97) 107/60  FiO2 (%):  [50 %-80 %] 80 %    Intake/Output Summary (Last 24 hours) at 11/17/2020 0843  Last data filed at 11/17/2020 0400  Gross per 24 hour   Intake 1225 ml   Output 750 ml   Net 475 ml     SpO2 Percentage    11/17/20 0600 11/17/20 0700 11/17/20 0736   SpO2: 92% 93% 94%      Physical Exam  Vitals signs and nursing note reviewed.   Constitutional:       Appearance: She is well-developed. She is ill-appearing.      Interventions: She is sedated, intubated and restrained.   HENT:      Head: Normocephalic and atraumatic.      Comments: ET and OG in place  Eyes:      Pupils: Pupils are equal, round, and reactive to light.   Cardiovascular:      Rate and Rhythm: Normal rate and regular rhythm.   Pulmonary:      Effort: She is intubated.      Breath sounds: Decreased breath sounds present.      Comments: No ventilator dyssynchrony  Abdominal:      Palpations: Abdomen is soft.   Genitourinary:     Comments: Cavazos catheter in place with minimal urine output  Musculoskeletal:      Comments: SCDs in place   Skin:     General: Skin is dry.      Coloration: Skin is pale.      Comments: Rosa hugger warmer in place   Neurological:      Mental Status: She is unresponsive.      Comments: Sedated and intubated       Results from last 7 days   Lab Units 11/17/20  0252 11/16/20  0305 11/15/20  0227   WBC 10*3/mm3 14.16* 10.46 10.80   HEMOGLOBIN g/dL 9.6* 7.8* 7.7*   PLATELETS 10*3/mm3 60* 44* 54*     Results from last 7 days   Lab Units  11/17/20  0252 11/16/20  0305 11/15/20  0227   SODIUM mmol/L 153* 155* 150*   POTASSIUM mmol/L 5.0 3.5 3.7   BUN mg/dL 35* 24* 16   CREATININE mg/dL 1.00 0.57 0.54*     Results from last 7 days   Lab Units 11/17/20  0505 11/16/20  0055 11/15/20  0023   PH, ARTERIAL pH units 7.132* 7.393 7.437   PCO2, ARTERIAL mm Hg 44.6 37.3 34.7*   PO2 ART mm Hg 67.8* 60.5* 61.7*   FIO2 % 60 60 40     Blood Culture   Date Value Ref Range Status   11/12/2020 No growth at 4 days  Preliminary   11/12/2020 No growth at 4 days  Preliminary     Respiratory Culture   Date Value Ref Range Status   11/13/2020   Final    Light growth (2+) Normal Respiratory Kristen: NO S.aureus/MRSA or Pseudomonas aeruginosa     Recent films:  No radiology results for the last day  Films reviewed personally by me.  My interpretation: Chest x-ray pending for today 11-17-20.    Pulmonary Assessment:  1. Acute hypoxemic respiratory failure requiring mechanical ventilation  2. Thrombocytopenia  3. Tobacco abuse  4. Probable COPD  5. Anemia  6. Rhabdomyolysis  7. Severe pulmonary hypertension  8. Initial hyponatremia, now hypernatremia    Recommend:   · Lactic acid level.  · Give 2 amps of bicarb this morning.  · Continue mechanical ventilation day #6, FiO2 increased to 80%, PEEP increased to 9.  · Get follow-up ABGs at noon today.  Discussed with JAVIER Dickson.  · Recommend palliative care consult to help determine who is going to make care decisions.  · Nutrition as tolerated, currently on hold.  · Daily ABG and chest x-ray while on ventilator.  New order placed for chest x-ray.  · Continue bronchodilator treatments with Pulmicort and DuoNebs.  · DVT and stress ulcer prophylaxis.  · Continue NicoDerm patch.  · Prognosis is guarded.    Electronically signed by SHANNAN Givens on 11/17/2020 at 08:43 CST    ATTESTATION OF CLINICAL NOTE:  I have reviewed the notes, assessments, and/or procedures performed by SHANNAN Urbina, I concur with  her/his documentation of Muriel Hrenandez.    Patient was seen in the follow-up visit in intensive care unit today.  She remains critically ill and ventilator dependent and appears to have worsening clinical status with increasing metabolic acidosis and sepsis.  Her lactic acid has significantly increased and she is doing poorly on the ventilator.  Sedated and was unable to provide any further history.  She remains encephalopathic    On clinical examination patient is a ill-appearing middle-aged  female orally intubated on ventilator.  She remains sedated and unresponsive.  HEENT atraumatic normocephalic.  Neck: Supple no mass no jugular venous distention.  Heart: Sounds normal, sinus tachycardia no gallop or murmur.  Lungs: Diminished air entry and bibasilar crackles.  Abdomen: Soft nontender bowel sounds present no organomegaly.  Extremities: No edema normal pulses color normal.  Neurologic: Could not be assessed.  Skin: No significant breakdown: Psych: Could not be evaluated.    She appears to have developing sepsis with worsening clinical status including metabolic acidosis.  She was started on bicarbonate bolus and may need bicarbonate drip to correct acidosis.  Lactic acid is high a chest x-ray and KUB x-ray will be obtained.  He may have infarction of the bowel or she may be developing a pneumonia or urosepsis.  I recommend to start her on broad-spectrum antibiotic with Zosyn and vancomycin.  Continue current treatment plan and supportive care.  Overall prognosis guarded continue routine respiratory care and bronchodilator treatment nutritional support.  Palliative care is recommended.  Overall prognosis guarded to poor.  Pulmonary team will continue following and make further recommendations.    I have seen and examined patient personally, performing a face-to-face diagnostic evaluation with plan of care reviewed and developed with APRN and nursing staff. I have addended and/or modified the  above history of present illness, physical examination, and assessment and plan to reflect my findings and impressions. Essential elements of the care plan were discussed with APRN above.  Agree with findings and assessment/plan as documented above.    Mary Montanez MD  Pulmonologist/Intensivist  11/17/2020 13:18 CST

## 2020-11-17 NOTE — CONSULTS
Palliative Care Initial Consult   Attending Physician: Jovan Juarez DO  Referring Provider: Jovan Juarez DO    Reason for Referral: assistance with clarification of goals of care  Family/Support: No family at bedside    Code Status and Medical Interventions:   Ordered at: 11/12/20 2057     Level Of Support Discussed With:    Patient     Code Status:    CPR     Medical Interventions (Level of Support Prior to Arrest):    Full     Goals of Care: TBD.    HPI:   60 y.o. female with past medical history significant for COPD, polysubstance abuse, severe pulmonary hypertension, skin cancer, alcohol dependency, and chronic hyponatremia.  Patient presented to [facility] on 11/12/2020 related to being found down.  She was unresponsive on arrival to ED and required intubation.  She was found to have multiple pressure associated skin injuries.  UDS positive for opiates and benzodiazepines.  CT of the chest revealed bilateral pneumonia.  She is admitted with severe sepsis.  Hematology was consulted for evaluation of anemia and thrombocytopenia.  She has received several fluid boluses and started on norepinephrine yesterday due to hypotension. Palliative Care Spoke With: family sister verbalizes patient's quality of life and functional status have declined over the last 1 year and more significantly over the last 2 months as patient's had increase in dizziness and forgetfulness.  Both sister and ex- live nearby and would either check on patient or talk to her via telephone every few days.  However sister verbalizes patient was very independent and requested no phone checks and preferred that patient call if she needed. Due to the Palliative Care Topics Discussed: palliative care, goals of care and care options we will establish an advance care plan.   Advance Care Planning   Advance Care Planning Discussion: @ 1134 AM Spoke with patient's sisterKatie.  Updated on patient's current health  "status, increased oxygenation needs and overall decline.  Sister verbalizes \"she (patient) should never have lived alone\" and \"she has really gone downhill over the last 1 year\" and \"she would never let anybody help her despite attempts\".  According to sister patient's daughter (only child/next of kin) lives in [place] and there has been family discord for a number of years.  Patient is a divorcee of greater than 20 years.  Informed sister attempts made to reach patient's daughter, Delmy via telephone without success. \"She may be working and unable to answer phone\".       Review of Systems   Unable to perform ROS: intubated     1- Pain Assessment  CPOT Facial Expression: 0-->relaxed, neutral  CPOT Body Movements: 0-->absence of movements  CPOT Muscle Tension: 0-->relaxed  Ventilator Compliance/Vocalization: 0-->talking in normal tone or no sound  CPOT Score: 0    Past Medical History:   Diagnosis Date   • Anxiety    • Arthritis    • Asthma    • Cancer (CMS/HCC)     skin   • Chronic back pain    • COPD (chronic obstructive pulmonary disease) (CMS/HCC)    • GERD (gastroesophageal reflux disease)    • Glaucoma    • Hypertension    • IBS (irritable bowel syndrome)    • Neoplasm of uncertain behavior     nasal tip   • On home oxygen therapy    • Skin cancer    • TIA (transient ischemic attack)    • UTI (urinary tract infection)      Past Surgical History:   Procedure Laterality Date   • BREAST SURGERY Left 1982    NO LYMPH NODES REMOVED   • CERVICAL FUSION     • COLONOSCOPY     • FLAP HEAD/NECK N/A 9/15/2020    Procedure: flap inset;  Surgeon: Jovan Trejo MD;  Location: [facility];  Service: ENT;  Laterality: N/A;   • HEAD/NECK LESION/CYST EXCISION Bilateral 8/4/2020    Procedure: 1) 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, 3.2 cm x 3.2 cm   2) paramedian forehead flap reconstruction of nasal tip with preservation of vascular pedicle   3) complex closure skin of forehead and scalp, 10.0 cm;  " Surgeon: Jovan Trejo MD;  Location:  [facility];  Service: ENT;  Laterality: Bilateral;   • HEAD/NECK LESION/CYST EXCISION N/A 9/15/2020    Procedure: Pedicle division and flap inset;  Surgeon: Jovan Trejo MD;  Location:  [facility];  Service: ENT;  Laterality: N/A;   • HYSTERECTOMY     • KNEE SURGERY     • NOSE SURGERY       Social History     Socioeconomic History   • Marital status:      Spouse name: Not on file   • Number of children: Not on file   • Years of education: Not on file   • Highest education level: Not on file   Tobacco Use   • Smoking status: Current Every Day Smoker     Packs/day: 1.00     Years: 50.00     Pack years: 50.00     Types: Cigarettes   • Smokeless tobacco: Never Used   Substance and Sexual Activity   • Alcohol use: Yes     Comment: OCASSIONALY   • Drug use: Never   • Sexual activity: Defer       Current Facility-Administered Medications   Medication Dose Route Frequency Provider Last Rate Last Dose   • acetaminophen (TYLENOL) tablet 650 mg  650 mg Oral Q4H PRN Jovan Juarez DO        Or   • acetaminophen (TYLENOL) 160 MG/5ML solution 650 mg  650 mg Oral Q4H PRN Jovan Juarez DO        Or   • acetaminophen (TYLENOL) suppository 650 mg  650 mg Rectal Q4H PRN Jovan Juarez DO       • budesonide (PULMICORT) nebulizer solution 0.5 mg  0.5 mg Nebulization BID - RT Mary Montanez MD   0.5 mg at 11/17/20 0736   • cefTRIAXone (ROCEPHIN) 1 g/100 mL 0.9% NS (MBP)  1 g Intravenous Q24H Keyon Wallis MD   1 g at 11/16/20 1152   • chlorhexidine (PERIDEX) 0.12 % solution 15 mL  15 mL Mouth/Throat Q12H Regan Hampton DO   15 mL at 11/16/20 2104   • dexmedetomidine HCl (PRECEDEX) 400 mcg in sodium chloride 0.9 % 100 mL infusion  0.1-1.5 mcg/kg/hr Intravenous Titrated Jovan Juarez DO 3.3 mL/hr at 11/17/20 0026 0.2 mcg/kg/hr at 11/17/20 0026   • dextrose (D5W) 5 % infusion  50 mL/hr Intravenous Continuous Juarez, Jovan C, DO       • famotidine (PEPCID) injection 20 mg   20 mg Intravenous BID Regan Hampton DO   20 mg at 11/16/20 2104   • fentaNYL citrate (PF) (SUBLIMAZE) 1,250 mcg in sodium chloride 0.9 % 250 mL (5 mcg/mL) infusion  0.7 mcg/kg/hr Intravenous Continuous Regan Hampton DO 9.24 mL/hr at 11/17/20 0509 0.7 mcg/kg/hr at 11/17/20 0509   • folic acid (FOLVITE) tablet 1 mg  1 mg Oral Daily Goldberg, Amit, MD   1 mg at 11/16/20 1036   • ipratropium-albuterol (DUO-NEB) nebulizer solution 3 mL  3 mL Nebulization Q4H - RT Regan Hampton DO   3 mL at 11/17/20 0736   • LORazepam (ATIVAN) injection 0.5 mg  0.5 mg Intravenous Q8H Jovan Juarez DO       • LORazepam (ATIVAN) injection 1 mg  1 mg Intravenous Q4H PRN Regan Hampton DO   1 mg at 11/15/20 0400   • metoprolol tartrate (LOPRESSOR) tablet 25 mg  25 mg Nasogastric Q12H Rodolfo Menendez MD   25 mg at 11/16/20 2115   • nicotine (NICODERM CQ) 21 MG/24HR patch 1 patch  1 patch Transdermal Q24H Demetri Ramirez APRN   1 patch at 11/16/20 0932   • norepinephrine (LEVOPHED) 8,000 mcg in sodium chloride 0.9 % 250 mL infusion  0.02-0.3 mcg/kg/min Intravenous Titrated Jovan Juarez DO 7.31 mL/hr at 11/17/20 0105 0.06 mcg/kg/min at 11/17/20 0105   • ondansetron (ZOFRAN) injection 4 mg  4 mg Intravenous Q6H PRN Regan Hampton DO       • potassium chloride 10 mEq in 100 mL IVPB  10 mEq Intravenous Q1H PRN Regan Hampton DO       • potassium phosphate 45 mmol in sodium chloride 0.9 % 500 mL infusion  45 mmol Intravenous PRN Regan Hampton DO        Or   • potassium phosphate 30 mmol in sodium chloride 0.9 % 250 mL infusion  30 mmol Intravenous PRN Regan Hampton, DO 31.3 mL/hr at 11/13/20 0540 30 mmol at 11/13/20 0540    Or   • Potassium Phosphates 15 mmol in sodium chloride 0.9 % 100 mL infusion  15 mmol Intravenous PRRegan Cerda, DO   15 mmol at 11/15/20 0430    Or   • sodium phosphates 45 mmol in sodium chloride 0.9 % 500 mL IVPB  45 mmol Intravenous PRN Memo, Ali  "Rima,         Or   • sodium phosphates 30 mmol in sodium chloride 0.9 % 250 mL IVPB  30 mmol Intravenous PRN Regan Hampton DO        Or   • sodium phosphates 15 mmol in sodium chloride 0.9 % 250 mL IVPB  15 mmol Intravenous PRN Regan Hampton DO       • predniSONE (DELTASONE) tablet 20 mg  20 mg Nasogastric Q8H Keyon Wallis MD   20 mg at 11/17/20 0645   • sodium bicarbonate injection 8.4% 50 mEq  50 mEq Intravenous Once Demetri Ramirez APRN       • sodium chloride 0.9 % flush 10 mL  10 mL Intravenous Q12H Regan Hampton DO   10 mL at 11/16/20 2107   • sodium chloride 0.9 % flush 10 mL  10 mL Intravenous PRN Regan Hampton DO         dexmedetomidine, 0.1-1.5 mcg/kg/hr, Last Rate: 0.2 mcg/kg/hr (11/17/20 0026)  dextrose, 50 mL/hr  fentanyl, 0.7 mcg/kg/hr, Last Rate: 0.7 mcg/kg/hr (11/17/20 0509)  norepinephrine, 0.02-0.3 mcg/kg/min, Last Rate: 0.06 mcg/kg/min (11/17/20 0105)      •  acetaminophen **OR** acetaminophen **OR** acetaminophen  •  LORazepam  •  ondansetron  •  potassium chloride  •  potassium phosphate infusion greater than 15 mMoles **OR** potassium phosphate infusion greater than 15 mMoles **OR** potassium phosphate **OR** sodium phosphate IVPB **OR** sodium phosphate IVPB **OR** sodium phosphate IVPB  •  sodium chloride    Allergies   Allergen Reactions   • Sulfa Antibiotics Hives   • Local [Lidocaine] Other (See Comments)     \"novocaine\" causes sneezing     Current medication reviewed for route, type, dose and frequency and are current per MAR at time of dictation.      Intake/Output Summary (Last 24 hours) at 11/17/2020 0848  Last data filed at 11/17/2020 0400  Gross per 24 hour   Intake 1225 ml   Output 750 ml   Net 475 ml       Physical Exam:    Diagnostics: Reviewed  /60   Pulse 84   Temp 93.4 °F (34.1 °C) (Oral)   Resp (!) 30   Ht 152.4 cm (60\")   Wt 64.8 kg (142 lb 14.4 oz)   SpO2 94%   BMI 27.91 kg/m²     Vitals signs and nursing note reviewed. "   Constitutional:       Appearance: Chronically ill-appearing and acutely ill-appearing.      Interventions: Sedated, intubated and restrained.   Eyes:      General: Lids are normal.   HENT:      Head: Normocephalic and atraumatic.      Nose:      Comments: Skin graft to nose intact.  Neck:      Vascular: JVD present.   Pulmonary:      Effort: Intubated.      Comments: FiO2 80%.  Cardiovascular:      Normal rate. Regular rhythm.   Edema:     Peripheral edema present.  Abdominal:      General: Bowel sounds are normal.   Musculoskeletal: Normal range of motion.   Skin:     Coloration: Skin is ashen.   Genitourinary:     Comments: Cavazos catheter in place.  Neurological:      Comments: Sedated   Psychiatric:      Comments: sedated       Patient status: Disease state: Deteriorating despite treatments.  Functional status: Palliative Performance Scale Score: Performance 10% based on the following measures: Ambulation: Totally bed bound, Activity and Evidence of Disease: Unable to do any work, extensive evidence of disease, Self-Care: Total care required,  Intake: Mouth care only, LOC: Drowsy or comatose   Nutritional status: Albumin 4.40. Body mass index is 27.91 kg/m².         Family support: The patient lacks significant family support..  Advance Directives: Advance Directive Status: Patient does not have advance directive   POA/Healthcare surrogate-Next of kin-daughter Delmy Bhatia and sister-Katie Barba.    Impression/Problem List:    1.  Severe sepsis  2.  Bilateral pneumonia  3.  Acute hypoxic respiratory failure  4.  Polysubstance abuse  5.  Pressure associated skin injuries  6.  COPD  7.  Rhabdomyolysis  8.  Severe pulmonary hypertension  9.  Tobacco abuse  10.  Thrombocytopenia  11.  Chronic pain  12.  Alcohol dependency  13.  Anemia, chronic    Recommendations/Plan:  1. plan: Goals of care include CODE STATUS CPR/full interventions per attending.    2.  Palliative care encounter  -Call placed at 1115 AM to  "patient's daughter, Delmy Bhatia.  No answer left voicemail and awaiting callback.  -Spoke with patient's sister, Katie.  Updated on patient's current health status, increased oxygenation needs and overall decline.  Sister verbalizes \"she should never have lived alone\" and \"she has really gone downhill over the last 1 year\" and \"she would never let anybody help her despite attempts\".  According to sister patient's daughter lives in [place] and there has been family discord for a number of years.  Informed sister attempts made to reach patient's daughterDelmy via telephone without success. \"She may be working and unable to answer phone\".    Addendum @1500 spoke with patient's daughter, Delmy Bhatia. At 1515 call placed to CCU and informed of CODE STATUS changes.  Informed by charge nurseCarolynn patient coded and  at 1514.  DaughterDelmy has been contacted and will provide  arrangements to CCU nurses once identified.        Thank you for this consult and allowing us to participate in patient's plan of care. Palliative Care Team will continue to follow patient.     Time spent:69 minutes spent reviewing medical and medication records, assessing and examining patient, discussing with family, answering questions, providing some guidance about a plan and documentation of care, and coordinating care with other healthcare members, with > 50% time spent face to face.     Janice Cameron, APRN  2020              "

## 2020-11-17 NOTE — PROGRESS NOTES
[facility] Medicine Services  INPATIENT PROGRESS NOTE    Patient Name: Muriel Hernandez  Date of Admission: 11/12/2020  Today's Date: 11/17/20  Length of Stay: 5  Primary Care Physician: Real Ailcea MD    Subjective   Chief Complaint: Respiratory failure  HPI   She had had several fluid boluses with good response with her blood pressure.  However, we had to start her on norepinephrine yesterday.  She has a midline in place.  She continues to have difficulty with weaning of ventilatory support.    Acidemia on ABG. Pulm has ordered a new CXR, KUB, and lactate.     Plan for a palliative care consult today.     Review of Systems   Unable to assess secondary to the patient's intubated and sedated state.     Objective    Temp:  [93.4 °F (34.1 °C)-98.2 °F (36.8 °C)] 93.4 °F (34.1 °C)  Heart Rate:  [60-95] 84  Resp:  [6-34] 30  BP: ()/(28-97) 107/60  FiO2 (%):  [50 %-80 %] 80 %  Physical Exam  Constitutional:       Appearance: She is well-developed.      Comments: Chronically ill-appearing 60-year-old  female patient who is currently ventilated and sedated. Discussed with her nurse, Elizabeth   HENT:      Head: Normocephalic and atraumatic.   Eyes:      Conjunctiva/sclera: Conjunctivae normal.   Neck:      Musculoskeletal: Neck supple.      Vascular: No JVD.   Cardiovascular:      Rate and Rhythm: Normal rate and regular rhythm.      Heart sounds: Normal heart sounds. No murmur. No friction rub. No gallop.    Pulmonary:      Effort: Pulmonary effort is normal. No respiratory distress.      Breath sounds: Rhonchi present. No wheezing or rales.   Chest:      Chest wall: No tenderness.   Abdominal:      General: Bowel sounds are normal. There is no distension.      Palpations: Abdomen is soft.      Tenderness: There is no abdominal tenderness. There is no guarding or rebound.   Musculoskeletal: Normal range of motion.         General: No tenderness or deformity.      Skin:     General: Skin is warm and dry.      Findings: No rash.   Neurological:      General: No focal deficit present.      Motor: Weakness present. No abnormal muscle tone.      Deep Tendon Reflexes: Reflexes normal.      Comments: Moves all extremities equally to painful stimuli.          Intake/Output Summary (Last 24 hours) at 11/17/2020 0824  Last data filed at 11/17/2020 0400  Gross per 24 hour   Intake 1225 ml   Output 750 ml   Net 475 ml     Results Review:  I have reviewed the labs, radiology results, and diagnostic studies.    Laboratory Data:   Results from last 7 days   Lab Units 11/17/20  0252 11/16/20  0305 11/15/20  0227   WBC 10*3/mm3 14.16* 10.46 10.80   HEMOGLOBIN g/dL 9.6* 7.8* 7.7*   HEMATOCRIT % 32.4* 25.1* 24.2*   PLATELETS 10*3/mm3 60* 44* 54*     Results from last 7 days   Lab Units 11/17/20  0252 11/16/20  0305 11/15/20  0227   SODIUM mmol/L 153* 155* 150*   POTASSIUM mmol/L 5.0 3.5 3.7   CHLORIDE mmol/L 119* 121* 115*   CO2 mmol/L 17.0* 24.0 24.0   BUN mg/dL 35* 24* 16   CREATININE mg/dL 1.00 0.57 0.54*   CALCIUM mg/dL 9.9 9.0 9.2   BILIRUBIN mg/dL 3.9* 4.0* 3.8*   ALK PHOS U/L 173* 129* 129*   ALT (SGPT) U/L 52* 44* 48*   AST (SGOT) U/L 99* 96* 130*   GLUCOSE mg/dL 89 120* 124*     Results from last 7 days   Lab Units 11/17/20  0505   PH, ARTERIAL pH units 7.132*   PO2 ART mm Hg 67.8*   PCO2, ARTERIAL mm Hg 44.6   HCO3 ART mmol/L 14.9*   FiO2 (%):  [50 %-80 %] 80 %  S RR:  [26] 26  PEEP/CPAP (cm H2O):  [7.5 cm H20] 7.5 cm H20  TN SUP:  [0 cm H20] 0 cm H20  MAP (cm H2O):  [15-16] 15    I have reviewed the patient's current medications.     Assessment/Plan     Active Hospital Problems    Diagnosis   • **Bilateral pneumonia   • Severe sepsis (CMS/HCC)   • Lactic acidosis   • Acute respiratory failure with hypoxia (CMS/HCC)   • Transaminitis   • Thrombocytopenia (CMS/HCC)   • Rhabdomyolysis   • Hypothermia   • Seizures (CMS/HCC)   • Current every day smoker   • COPD (chronic obstructive  pulmonary disease) (CMS/HCC)   • Chronic pain   • Normocytic anemia     Plan:  The patient was admitted on 11/12 Dr. Hampton.  She presented after an unresponsive episode.  She was found at home by her family.  She was hypothermic.  She had a work-up and imaging in the emergency department that found her to have bilateral pneumonia.  She has required mechanical ventilation since presentation.     Pulmonary continues to follow. Continue sedation with Precedex and fentanyl.  Awaiting for her to be appropriate for spontaneous breathing trials.  She will not be appropriate again today.  She is on 80% FiO2 and remains tachypneic.    CXR, KUB, and lactic pending.      COVID-19 testing negative x 2 ([employer], [employer]).  Expanded respiratory PCR panel negative. Influenza negative.  Blood cultures with no growth to date.  Streptococcal and Legionella urinary antigens negative. No MRSA detected on nasal screen. Sputum culture with budding yeast. Continue cefepime for now.  Vancomycin stopped on 11/15.     She very likely has had some mild rhabdomyolysis given her presentation.  CPK downtrended. Troponin was slightly elevated and the trend was flat. Transaminases still elevated with a rising bilirubin.  CT of the abdomen pelvis showed a normal appearance of the liver, gallbladder, and pancreas. Ultrasound of the liver showed steatosis.    She has developed hypotension.  She is requiring norepinephrine.  Her serum creatinine has gone from 0.57 to 1.0 today.  Pulmonary tried to diurese her yesterday.  She is nonoliguric.  Start back on some dextrose fluids today.  She also has hyponatremia.     She remains thrombocytopenic.  This may be secondary to history of bone marrow suppression from alcohol abuse and sepsis. Hematology consulted and their notes are reviewed.    CIWA protocol is nearly complete.    Nicotine patch applied.     Nutrition consulted for tube feeding; trophic.     Pepcid for peptic ulcer prophylaxis.     SCDs for  DVT prophylaxis.    Palliative care consultation.  She has an ex-.  She typically lives at home by herself.  It is unclear who would make decisions for her from this point.    Electronically signed by Jovan Juarez DO, 11/17/20, 08:24 CST.    Approximately 35 minutes of critical care time were spent managing the patient exclusive of billable procedures.

## 2020-11-17 NOTE — DISCHARGE SUMMARY
[facility] [place] Medicine Services  DEATH SUMMARY       Date of Admission: 11/12/2020  Date of Death:  11/17/2020 at approximately 1514  Primary Care Physician: Real Alicea MD    Presenting Problem/History of Present Illness:  Severe sepsis (CMS/HCC) [A41.9, R65.20]     Final Death Diagnoses:  Active Hospital Problems    Diagnosis   • **Bilateral pneumonia   • Severe sepsis (CMS/HCC)   • Lactic acidosis   • Acute respiratory failure with hypoxia (CMS/HCC)   • Transaminitis   • Thrombocytopenia (CMS/HCC)   • Rhabdomyolysis   • Hypothermia   • Seizures (CMS/HCC)   • Current every day smoker   • COPD (chronic obstructive pulmonary disease) (CMS/HCC)   • Chronic pain   • Normocytic anemia     Consults: Pulmonary and hematology.    Procedures Performed: Intubation mechanical ventilation.  Midline placement.    Pertinent Test Results: See daily progress notes in the EMR.    Hospital Course:  See my daily progress notes that described her hospital course.    The patient developed a lactic acidosis earlier this morning.  We were concerned that she may have some ischemic bowel.  She continued to worsen.  We made ventilator adjustments.  We bolused fluids.  We have sodium bicarbonate.  We broadened her antibiotics.  Made multiple attempts to contact patient's daughter.  We ended up talking to her ex- and her sister.  They did not want to make any decisions on how to proceed.  I told him that she would likely have a cardiac arrest at some point this afternoon.  She ultimately had a PEA arrest and passed away at 1514 after 2 resuscitative efforts.    The nursing staff is contacting her ex- and her to her.  We will continue to make efforts to contact her daughter.    Electronically signed by Jovan Juarez DO, 11/17/20, 15:26 CST.

## 2020-11-18 LAB — ANA TITR SER IF: NEGATIVE {TITER}

## 2020-11-18 NOTE — SIGNIFICANT NOTE
CPR per Dr Juarez, pressors maxed, pt extremeties blue coloration. Bagging to attempt to raise O2 sats.

## 2020-11-20 LAB
COPPER SERPL-MCNC: 90 UG/DL (ref 72–166)
ZINC SERPL-MCNC: 54 UG/DL (ref 56–134)

## 2020-11-22 LAB
BACTERIA SPEC AEROBE CULT: NORMAL
BACTERIA SPEC AEROBE CULT: NORMAL

## (undated) DEVICE — SOL IRR BSS 15ML STRL

## (undated) DEVICE — 3M™ STERI-STRIP™ REINFORCED ADHESIVE SKIN CLOSURES, R1547, 1/2 IN X 4 IN (12 MM X 100 MM), 6 STRIPS/ENVELOPE: Brand: 3M™ STERI-STRIP™

## (undated) DEVICE — SPNG GZ WOVN 4X4IN 12PLY 10/BX STRL

## (undated) DEVICE — DRSNG GZ CURAD XEROFORM NONADHS 5X9IN STRL

## (undated) DEVICE — SUT SILK 2/0 SH 30IN K833H

## (undated) DEVICE — MARKR SKIN W/RULR AND LBL

## (undated) DEVICE — DISPOSABLE BIPOLAR CABLE 12FT. (3.6M): Brand: KIRWAN

## (undated) DEVICE — PREP SOL POVIDONE/IODINE BT 4OZ

## (undated) DEVICE — GLV SURG BIOGEL LTX PF 8

## (undated) DEVICE — PROXIMATE RH ROTATING HEAD SKIN STAPLERS (35 WIDE) CONTAINS 35 STAINLESS STEEL STAPLES: Brand: PROXIMATE

## (undated) DEVICE — PK TURNOVER RM ADV

## (undated) DEVICE — ELECTRD NDL EZ CLN MOD 2.75IN

## (undated) DEVICE — DRSNG TELFA PAD NONADH STR 1S 3X8IN

## (undated) DEVICE — PK ENT HD AND NK 30

## (undated) DEVICE — SUT GUT PLN FAST ABS 5/0 PC1 18IN 1915G

## (undated) DEVICE — SUT MNCRYL 5/0 P3 18IN UD MCP493G